# Patient Record
Sex: FEMALE | Race: WHITE | Employment: FULL TIME | URBAN - METROPOLITAN AREA
[De-identification: names, ages, dates, MRNs, and addresses within clinical notes are randomized per-mention and may not be internally consistent; named-entity substitution may affect disease eponyms.]

---

## 2017-02-02 ENCOUNTER — OFFICE VISIT (OUTPATIENT)
Dept: FAMILY MEDICINE CLINIC | Age: 57
End: 2017-02-02

## 2017-02-02 VITALS
BODY MASS INDEX: 30.66 KG/M2 | DIASTOLIC BLOOD PRESSURE: 88 MMHG | RESPIRATION RATE: 12 BRPM | WEIGHT: 202.3 LBS | HEIGHT: 68 IN | SYSTOLIC BLOOD PRESSURE: 146 MMHG | HEART RATE: 76 BPM

## 2017-02-02 DIAGNOSIS — Z51.81 MEDICATION MONITORING ENCOUNTER: ICD-10-CM

## 2017-02-02 DIAGNOSIS — M96.1 LUMBAR POST-LAMINECTOMY SYNDROME: ICD-10-CM

## 2017-02-02 DIAGNOSIS — Z00.00 PERIODIC HEALTH ASSESSMENT, GENERAL SCREENING, ADULT: ICD-10-CM

## 2017-02-02 DIAGNOSIS — M51.9 LUMBAR DISC DISEASE: ICD-10-CM

## 2017-02-02 DIAGNOSIS — G89.4 CHRONIC PAIN SYNDROME: Primary | ICD-10-CM

## 2017-02-02 PROCEDURE — 99213 OFFICE O/P EST LOW 20 MIN: CPT | Performed by: FAMILY MEDICINE

## 2017-02-02 ASSESSMENT — ENCOUNTER SYMPTOMS
BACK PAIN: 1
RESPIRATORY NEGATIVE: 1
COUGH: 0

## 2017-02-19 DIAGNOSIS — R73.01 IFG (IMPAIRED FASTING GLUCOSE): Primary | ICD-10-CM

## 2017-07-26 RX ORDER — RANITIDINE 300 MG/1
TABLET ORAL
Qty: 30 TABLET | Refills: 6 | Status: SHIPPED | OUTPATIENT
Start: 2017-07-26 | End: 2017-09-13 | Stop reason: SDUPTHER

## 2017-09-13 ENCOUNTER — OFFICE VISIT (OUTPATIENT)
Dept: FAMILY MEDICINE CLINIC | Age: 57
End: 2017-09-13
Payer: COMMERCIAL

## 2017-09-13 VITALS
RESPIRATION RATE: 16 BRPM | WEIGHT: 197.4 LBS | HEIGHT: 68 IN | HEART RATE: 88 BPM | BODY MASS INDEX: 29.92 KG/M2 | DIASTOLIC BLOOD PRESSURE: 76 MMHG | SYSTOLIC BLOOD PRESSURE: 138 MMHG

## 2017-09-13 DIAGNOSIS — K29.30 CHRONIC SUPERFICIAL GASTRITIS WITHOUT BLEEDING: Primary | ICD-10-CM

## 2017-09-13 DIAGNOSIS — M96.1 LUMBAR POST-LAMINECTOMY SYNDROME: ICD-10-CM

## 2017-09-13 DIAGNOSIS — M51.9 LUMBAR DISC DISEASE: ICD-10-CM

## 2017-09-13 DIAGNOSIS — Z12.31 ENCOUNTER FOR SCREENING MAMMOGRAM FOR BREAST CANCER: ICD-10-CM

## 2017-09-13 DIAGNOSIS — R51.9 NONINTRACTABLE EPISODIC HEADACHE, UNSPECIFIED HEADACHE TYPE: ICD-10-CM

## 2017-09-13 DIAGNOSIS — G89.4 CHRONIC PAIN SYNDROME: ICD-10-CM

## 2017-09-13 PROCEDURE — 99214 OFFICE O/P EST MOD 30 MIN: CPT | Performed by: FAMILY MEDICINE

## 2017-09-13 RX ORDER — RANITIDINE 300 MG/1
300 TABLET ORAL NIGHTLY
Qty: 90 TABLET | Refills: 3 | Status: SHIPPED | OUTPATIENT
Start: 2017-09-13 | End: 2018-11-05 | Stop reason: SDUPTHER

## 2017-09-13 ASSESSMENT — ENCOUNTER SYMPTOMS
BLOOD IN STOOL: 0
VOMITING: 0
BACK PAIN: 1
ALLERGIC/IMMUNOLOGIC NEGATIVE: 1
ABDOMINAL PAIN: 1
DIARRHEA: 0
NAUSEA: 0
COUGH: 0
RESPIRATORY NEGATIVE: 1

## 2017-09-13 ASSESSMENT — PATIENT HEALTH QUESTIONNAIRE - PHQ9
SUM OF ALL RESPONSES TO PHQ QUESTIONS 1-9: 0
1. LITTLE INTEREST OR PLEASURE IN DOING THINGS: 0
2. FEELING DOWN, DEPRESSED OR HOPELESS: 0
SUM OF ALL RESPONSES TO PHQ9 QUESTIONS 1 & 2: 0

## 2018-01-30 RX ORDER — RANITIDINE 300 MG/1
TABLET ORAL
Qty: 30 TABLET | Refills: 6 | Status: SHIPPED | OUTPATIENT
Start: 2018-01-30 | End: 2018-02-27 | Stop reason: SDUPTHER

## 2018-02-27 ENCOUNTER — OFFICE VISIT (OUTPATIENT)
Dept: FAMILY MEDICINE CLINIC | Age: 58
End: 2018-02-27
Payer: COMMERCIAL

## 2018-02-27 VITALS
BODY MASS INDEX: 29.73 KG/M2 | WEIGHT: 196.2 LBS | TEMPERATURE: 99 F | HEART RATE: 105 BPM | HEIGHT: 68 IN | DIASTOLIC BLOOD PRESSURE: 76 MMHG | SYSTOLIC BLOOD PRESSURE: 120 MMHG | OXYGEN SATURATION: 95 % | RESPIRATION RATE: 15 BRPM

## 2018-02-27 DIAGNOSIS — R05.9 COUGH: ICD-10-CM

## 2018-02-27 DIAGNOSIS — R68.89 FLU-LIKE SYMPTOMS: ICD-10-CM

## 2018-02-27 DIAGNOSIS — J22 LRTI (LOWER RESPIRATORY TRACT INFECTION): Primary | ICD-10-CM

## 2018-02-27 PROCEDURE — 99213 OFFICE O/P EST LOW 20 MIN: CPT | Performed by: NURSE PRACTITIONER

## 2018-02-27 RX ORDER — LEVOFLOXACIN 500 MG/1
500 TABLET, FILM COATED ORAL DAILY
Qty: 10 TABLET | Refills: 0 | Status: SHIPPED | OUTPATIENT
Start: 2018-02-27 | End: 2018-03-09

## 2018-02-27 RX ORDER — ALBUTEROL SULFATE 90 UG/1
2 AEROSOL, METERED RESPIRATORY (INHALATION) EVERY 6 HOURS PRN
Qty: 1 INHALER | Refills: 3 | Status: SHIPPED | OUTPATIENT
Start: 2018-02-27 | End: 2018-11-26 | Stop reason: SDUPTHER

## 2018-02-27 RX ORDER — PREDNISONE 50 MG/1
50 TABLET ORAL DAILY
Qty: 5 TABLET | Refills: 0 | Status: SHIPPED | OUTPATIENT
Start: 2018-02-27 | End: 2018-03-04

## 2018-02-27 ASSESSMENT — ENCOUNTER SYMPTOMS
SORE THROAT: 1
ABDOMINAL PAIN: 0
SHORTNESS OF BREATH: 1
NAUSEA: 0
COUGH: 1
CHEST TIGHTNESS: 1
RHINORRHEA: 1

## 2018-02-28 ENCOUNTER — TELEPHONE (OUTPATIENT)
Dept: FAMILY MEDICINE CLINIC | Age: 58
End: 2018-02-28

## 2018-03-05 ENCOUNTER — HOSPITAL ENCOUNTER (OUTPATIENT)
Age: 58
Discharge: HOME | End: 2018-03-05
Payer: COMMERCIAL

## 2018-03-05 ENCOUNTER — HOSPITAL ENCOUNTER (OUTPATIENT)
Dept: GENERAL RADIOLOGY | Age: 58
Discharge: HOME | End: 2018-03-05
Payer: COMMERCIAL

## 2018-03-05 ENCOUNTER — TELEPHONE (OUTPATIENT)
Dept: FAMILY MEDICINE CLINIC | Age: 58
End: 2018-03-05

## 2018-03-05 DIAGNOSIS — J22 LRTI (LOWER RESPIRATORY TRACT INFECTION): ICD-10-CM

## 2018-03-05 DIAGNOSIS — J22 LRTI (LOWER RESPIRATORY TRACT INFECTION): Primary | ICD-10-CM

## 2018-03-05 LAB
BASOPHILS # BLD: 0.5 %
BASOPHILS ABSOLUTE: 0 THOU/MM3 (ref 0–0.1)
EOSINOPHIL # BLD: 0.2 %
EOSINOPHILS ABSOLUTE: 0 THOU/MM3 (ref 0–0.4)
HCT VFR BLD CALC: 45.1 % (ref 37–47)
HEMOGLOBIN: 15.6 GM/DL (ref 12–16)
LYMPHOCYTES # BLD: 20.8 %
LYMPHOCYTES ABSOLUTE: 2 THOU/MM3 (ref 1–4.8)
MCH RBC QN AUTO: 32.1 PG (ref 27–31)
MCHC RBC AUTO-ENTMCNC: 34.6 GM/DL (ref 33–37)
MCV RBC AUTO: 92.8 FL (ref 81–99)
MONOCYTES # BLD: 8.5 %
MONOCYTES ABSOLUTE: 0.8 THOU/MM3 (ref 0.4–1.3)
NUCLEATED RED BLOOD CELLS: 0 /100 WBC
PDW BLD-RTO: 13.2 % (ref 11.5–14.5)
PLATELET # BLD: 302 THOU/MM3 (ref 130–400)
PMV BLD AUTO: 9 FL (ref 7.4–10.4)
RBC # BLD: 4.86 MILL/MM3 (ref 4.2–5.4)
SEG NEUTROPHILS: 70 %
SEGMENTED NEUTROPHILS ABSOLUTE COUNT: 6.9 THOU/MM3 (ref 1.8–7.7)
WBC # BLD: 9.8 THOU/MM3 (ref 4.8–10.8)

## 2018-03-05 PROCEDURE — 85025 COMPLETE CBC W/AUTO DIFF WBC: CPT

## 2018-03-05 PROCEDURE — 36415 COLL VENOUS BLD VENIPUNCTURE: CPT

## 2018-03-05 PROCEDURE — 71046 X-RAY EXAM CHEST 2 VIEWS: CPT

## 2018-03-05 NOTE — TELEPHONE ENCOUNTER
Patient states that she is still feeling no better from 2/27/18 appt. She has not yet finished antibiotics but states that they don't seem to be helping, temp is still spiking at night to approx 100. She is now also having diarrhea and is wondering if she can add imodium in with her antibiotic. Please advise.     DOLV 2/27/18

## 2018-03-15 ENCOUNTER — OFFICE VISIT (OUTPATIENT)
Dept: FAMILY MEDICINE CLINIC | Age: 58
End: 2018-03-15
Payer: COMMERCIAL

## 2018-03-15 VITALS
WEIGHT: 195 LBS | HEART RATE: 120 BPM | RESPIRATION RATE: 16 BRPM | OXYGEN SATURATION: 96 % | DIASTOLIC BLOOD PRESSURE: 72 MMHG | HEIGHT: 68 IN | BODY MASS INDEX: 29.55 KG/M2 | SYSTOLIC BLOOD PRESSURE: 136 MMHG

## 2018-03-15 DIAGNOSIS — R05.3 CHRONIC COUGH: ICD-10-CM

## 2018-03-15 DIAGNOSIS — Z51.81 MEDICATION MONITORING ENCOUNTER: ICD-10-CM

## 2018-03-15 DIAGNOSIS — Z72.0 TOBACCO USE: ICD-10-CM

## 2018-03-15 DIAGNOSIS — J30.89 CHRONIC NONSEASONAL ALLERGIC RHINITIS DUE TO OTHER ALLERGEN: ICD-10-CM

## 2018-03-15 DIAGNOSIS — R51.9 NONINTRACTABLE EPISODIC HEADACHE, UNSPECIFIED HEADACHE TYPE: ICD-10-CM

## 2018-03-15 DIAGNOSIS — Z12.31 ENCOUNTER FOR SCREENING MAMMOGRAM FOR BREAST CANCER: ICD-10-CM

## 2018-03-15 DIAGNOSIS — M96.1 LUMBAR POST-LAMINECTOMY SYNDROME: ICD-10-CM

## 2018-03-15 DIAGNOSIS — M51.9 LUMBAR DISC DISEASE: Primary | ICD-10-CM

## 2018-03-15 DIAGNOSIS — R73.01 IFG (IMPAIRED FASTING GLUCOSE): ICD-10-CM

## 2018-03-15 DIAGNOSIS — J43.1 PANLOBULAR EMPHYSEMA (HCC): ICD-10-CM

## 2018-03-15 PROCEDURE — 99214 OFFICE O/P EST MOD 30 MIN: CPT | Performed by: FAMILY MEDICINE

## 2018-03-15 ASSESSMENT — ENCOUNTER SYMPTOMS
SHORTNESS OF BREATH: 1
SINUS PAIN: 1
GASTROINTESTINAL NEGATIVE: 1
WHEEZING: 0
BACK PAIN: 1
COUGH: 1

## 2018-03-15 NOTE — PATIENT INSTRUCTIONS
You may receive a survey about your visit with us today. The feedback from our patients helps us identify what is working well and where the service to all patients can be enhanced. Thank you! Patient Education        Read smoking cessation information. Start using the lowest dose nicotine patch over-the-counter to stop smoking. Get the pulmonary function tests done by the end of next week    Learning About Benefits From Quitting Smoking  How does quitting smoking make you healthier? If you're thinking about quitting smoking, you may have a few reasons to be smoke-free. Your health may be one of them. · When you quit smoking, you lower your risks for cancer, lung disease, heart attack, stroke, blood vessel disease, and blindness from macular degeneration. · When you're smoke-free, you get sick less often, and you heal faster. You are less likely to get colds, flu, bronchitis, and pneumonia. · As a nonsmoker, you may find that your mood is better and you are less stressed. When and how will you feel healthier? Quitting has real health benefits that start from day 1 of being smoke-free. And the longer you stay smoke-free, the healthier you get and the better you feel. The first hours  · After just 20 minutes, your blood pressure and heart rate go down. That means there's less stress on your heart and blood vessels. · Within 12 hours, the level of carbon monoxide in your blood drops back to normal. That makes room for more oxygen. With more oxygen in your body, you may notice that you have more energy than when you smoked. After 2 weeks  · Your lungs start to work better. · Your risk of heart attack starts to drop. After 1 month  · When your lungs are clear, you cough less and breathe deeper, so it's easier to be active. · Your sense of taste and smell return. That means you can enjoy food more than you have since you started smoking.   Over the years  · After 1 year, your risk of heart disease is half what it would be if you kept smoking. · After 5 years, your risk of stroke starts to shrink. Within a few years after that, it's about the same as if you'd never smoked. · After 10 years, your risk of dying from lung cancer is cut by about half. And your risk for many other types of cancer is lower too. How would quitting help others in your life? When you quit smoking, you improve the health of everyone who now breathes in your smoke. · Their heart, lung, and cancer risks drop, much like yours. · They are sick less. For babies and small children, living smoke-free means they're less likely to have ear infections, pneumonia, and bronchitis. · If you're a woman who is or will be pregnant someday, quitting smoking means a healthier . · Children who are close to you are less likely to become adult smokers. Where can you learn more? Go to https://Retro[employer] AmericagatoLailaihui.TwoChop. org and sign in to your IAMINTOIT account. Enter [id number] [id number] in the [employer] box to learn more about \"Learning About Benefits From Quitting Smoking. \"     If you do not have an account, please click on the \"Sign Up Now\" link. Current as of: 2017  Content Version: 11.5  © 6890-6198 Healthwise, [employer]. Care instructions adapted under license by [facility] ([facility]). If you have questions about a medical condition or this instruction, always ask your healthcare professional. [place] [address] any warranty or liability for your use of this information. Patient Education        Deciding About Using Medicines To Quit Smoking  Deciding About Using Medicines To Quit Smoking  What are the medicines you can use? Your doctor may prescribe varenicline (Chantix) or bupropion (Zyban). These medicines can help you cope with cravings for tobacco. They are pills that don't contain nicotine. You also can use nicotine replacement products. These do contain nicotine. There are many types.   · Gum and lozenges slowly release nicotine into your mouth. · Patches stick to your skin. They slowly release nicotine into your bloodstream.  · An inhaler has a holguin that contains nicotine. You breathe in a puff of nicotine vapor through your mouth and throat. · Nasal spray releases a mist that contains nicotine. What are key points about this decision? · Using medicines can double your chances of quitting smoking. They can ease cravings and withdrawal symptoms. · Getting counseling along with using medicine can raise your chances of quitting even more. · If you smoke fewer than 5 cigarettes a day, you may not need medicines to help you quit smoking. · These medicines have less nicotine than cigarettes. And by itself, nicotine is not nearly as harmful as smoking. The tars, carbon monoxide, and other toxic chemicals in tobacco cause the harmful effects. · The side effects of nicotine replacement products depend on the type of product. For example, a patch can make your skin red and itchy. Medicines in pill form can make you sick to your stomach. They can also cause dry mouth and trouble sleeping. For most people, the side effects are not bad enough to make them stop using the products. Why might you choose to use medicines to quit smoking? · You have tried on your own to stop smoking, but you were not able to stop. · You smoke more than 5 cigarettes a day. · You want to increase your chances of quitting smoking. · You want to reduce your cravings and withdrawal symptoms. · You feel the benefits of medicine outweigh the side effects. Why might you choose not to use medicine? · You want to try quitting on your own by stopping all at once (\"cold turkey\"). · You want to cut back slowly on the number of cigarettes you smoke. · You smoke fewer than 5 cigarettes a day. · You do not like using medicine. · You feel the side effects of medicines outweigh the benefits. · You are worried about the cost of medicines.   Your decision  Thinking about the facts and your feelings can help you make a decision that is right for you. Be sure you understand the benefits and risks of your options, and think about what else you need to do before you make the decision. Where can you learn more? Go to https://archie.Heartscape. org and sign in to your Edfolio account. Enter K616 in the [employer] box to learn more about \"Deciding About Using Medicines To Quit Smoking. \"     If you do not have an account, please click on the \"Sign Up Now\" link. Current as of: March 20, 2017  Content Version: 11.5  © 8622-8817 [employer]. Care instructions adapted under license by [facility] ([facility]). If you have questions about a medical condition or this instruction, always ask your healthcare professional. [address] any warranty or liability for your use of this information. Patient Education        Stopping Smoking: Care Instructions  Your Care Instructions    Cigarette smokers crave the nicotine in cigarettes. Giving it up is much harder than simply changing a habit. Your body has to stop craving the nicotine. It is hard to quit, but you can do it. There are many tools that people use to quit smoking. You may find that combining tools works best for you. There are several steps to quitting. First you get ready to quit. Then you get support to help you. After that, you learn new skills and behaviors to become a nonsmoker. For many people, a necessary step is getting and using medicine. Your doctor will help you set up the plan that best meets your needs. You may want to attend a smoking cessation program to help you quit smoking. When you choose a program, look for one that has proven success. Ask your doctor for ideas.  You will greatly increase your chances of success if you take medicine as well as get counseling or join a cessation program.  Some of the changes you feel when you first quit tobacco are uncomfortable. Your body will miss the nicotine at first, and you may feel short-tempered and grumpy. You may have trouble sleeping or concentrating. Medicine can help you deal with these symptoms. You may struggle with changing your smoking habits and rituals. The last step is the tricky one: Be prepared for the smoking urge to continue for a time. This is a lot to deal with, but keep at it. You will feel better. Follow-up care is a key part of your treatment and safety. Be sure to make and go to all appointments, and call your doctor if you are having problems. It's also a good idea to know your test results and keep a list of the medicines you take. How can you care for yourself at home? · Ask your family, friends, and coworkers for support. You have a better chance of quitting if you have help and support. · Join a support group, such as Nicotine Anonymous, for people who are trying to quit smoking. · Consider signing up for a smoking cessation program, such as the American Lung Association's Freedom from Smoking program.  · Set a quit date. Pick your date carefully so that it is not right in the middle of a big deadline or stressful time. Once you quit, do not even take a puff. Get rid of all ashtrays and lighters after your last cigarette. Clean your house and your clothes so that they do not smell of smoke. · Learn how to be a nonsmoker. Think about ways you can avoid those things that make you reach for a cigarette. ¨ Avoid situations that put you at greatest risk for smoking. For some people, it is hard to have a drink with friends without smoking. For others, they might skip a coffee break with coworkers who smoke. ¨ Change your daily routine. Take a different route to work or eat a meal in a different place. · Cut down on stress. Calm yourself or release tension by doing an activity you enjoy, such as reading a book, taking a hot bath, or gardening.   · Talk to your doctor or that air pollution is a trigger for you. It is important to know what your triggers are and how to deal with them. If air pollution is a trigger for you, you need to learn about air quality and pay attention to weather forecasts that include how bad the air is expected to be. How can you manage a flare-up caused by air pollution? · Do not panic. Quick treatment at home may help you prevent serious breathing problems. · Take your medicines exactly as your doctor tells you. ¨ Use your quick-relief inhaler as directed by your doctor. If your symptoms do not get better after you use your medicine, have someone take you to the emergency room. Call an ambulance if necessary. ¨ With inhaled medicines, a spacer or a nebulizer may help you get more medicine to your lungs. Ask your doctor or pharmacist how to use them properly. Practice using the spacer in front of a mirror before you have a flare-up. This may help you get the medicine into your lungs quickly. ¨ If your doctor has given you steroid pills, take them as directed. ¨ Talk to your doctor if you have any problems with your medicine. What can you do to prevent flare-ups? · Try not to be outside when air pollution levels are high. Stay at home with your windows closed. · Do not smoke. This is the most important step you can take to prevent more damage to your lungs and prevent problems. If you already smoke, it is never too late to stop. If you need help quitting, talk to your doctor about stop-smoking programs and medicines. These can increase your chances of quitting for good. · Avoid secondhand smoke; cold, dry air; and high altitudes. · Take your daily medicines as prescribed. · Avoid colds and flu. ¨ Get a pneumococcal vaccine. ¨ Get a flu vaccine each year, as soon as it is available. Ask those you live or work with to do the same, so they will not get the flu and infect you. ¨ Try to stay away from people with colds or the flu.   Odette Reyes Wash your get dressed or fix a meal.  · It is hard to eat or exercise. · You lose weight and feel weaker. But there are things you can do to prevent more damage and feel better. What are the symptoms? The main symptoms are:  · A cough that will not go away. · Mucus that comes up when you cough. · Shortness of breath that gets worse with activity. At times, your symptoms may suddenly flare up and get much worse. This is a called a COPD exacerbation (say \"egg-BARBARA-er-BAY-shun\"). When this happens, your usual symptoms quickly get worse and stay bad. This can be dangerous. You may have to go to the hospital.  How can you keep COPD from getting worse? Don't smoke. That is the best way to keep COPD from getting worse. If you already smoke, it is never too late to stop. If you need help quitting, talk to your doctor about stop-smoking programs and medicines. These can increase your chances of quitting for good. You can do other things to keep COPD from getting worse:  · Avoid bad air. Air pollution, chemical fumes, and dust also can make COPD worse. · Get a flu shot every year. A shot may keep the flu from turning into something more serious, like pneumonia. A flu shot also may lower your chances of having a COPD flare-up. · Get a pneumococcal vaccine shot. If you have had one before, ask your doctor if you need another dose. How is COPD treated? COPD is treated with medicines and oxygen. You also can take steps at home to stay healthy and keep your COPD from getting worse. Medicines and oxygen therapy  · You may be taking medicines such as:  ¨ Bronchodilators. These help open your airways and make breathing easier. Bronchodilators are either short-acting (work for 6 to 9 hours) or long-acting (work for 24 hours). You inhale most bronchodilators, so they start to act quickly. Always carry your quick-relief inhaler with you in case you need it while you are away from home. ¨ Corticosteroids.  These reduce airway

## 2018-03-15 NOTE — PROGRESS NOTES
Subjective:      Patient ID: Ange Elliott is a 59 y.o. female. HPI  Follow up of chronic conditions. Encounter Diagnoses   Name Primary?  Encounter for screening mammogram for breast cancer     Lumbar disc disease, herniated L5-S1 Yes    Nonintractable episodic headache, unspecified headache type     Chronic nonseasonal allergic rhinitis due to other allergen     Tobacco use     Lumbar post-laminectomy syndrome     IFG (impaired fasting glucose)     Medication monitoring encounter     Chronic cough, family hx - COPD     Panlobular emphysema ([address]      Patient is here for follow-up of her chronic pain syndrome. She continues to be treated by pain management and this varies from day-to-day in terms of the severity of her pain. She continues to have difficulties coughing but she has found that when she is at work, there are TXU Elia" blowout of the heating ducts and by the end of the day, she is having marked increase in coughing and some shortness of breath which she has to use her albuterol inhaler to reverse. Unfortunately, she continues to smoke a half a pack of cigarettes daily and the need to stop smoking now was discussed with her because it is reversing any pulmonary reserve that she has to deal with lung insults from her environment. She has tried to cut down in the past but only by talking herself out of smoking which has not been successful. Her mother was a smoker and ended up with emphysema. As far she knows, she is only family member with COPD. Prudy Remedies started smoking approximately 20-25 years ago and at this point, her-pack-year history is somewhere between 8 and 13. She has not attempted to use over-the-counter smoking aids to quit so after some discussion, she will try using nicotine patches. I suggested she start with the lowest strength patch because she is very sensitive to medications and she has essentially been mostly a half pack a day smoker.     Her recent chest x-ray this month show that there are fibroemphysematous changes present and the lungs are hyperinflated. She continues to have her nonseasonal headaches which tend to be related to barometric pressure changes. She usually \"toughs them out\" or will use ibuprofen stops them. The rest of this patient's conditions are stable. Past medical and surgical hx reviewed. Past Medical History:   Diagnosis Date    Allergic rhinitis     Chronic back pain     Headache(784.0)     Panlobular emphysema ([address] 3/15/2018     Past Surgical History:   Procedure Laterality Date    SPINE SURGERY       Portions of this note were completed with a voice recording program.  Efforts were made to edit the dictations but occasionally words are mis-transcribed. Review of Systems   Constitutional: Positive for fatigue. HENT: Positive for congestion and sinus pain. Respiratory: Positive for cough and shortness of breath. Negative for wheezing. Cardiovascular: Negative for chest pain, palpitations and leg swelling. Gastrointestinal: Negative. Genitourinary: Negative. Musculoskeletal: Positive for arthralgias and back pain. Skin: Negative. Allergic/Immunologic: Positive for environmental allergies. Neurological: Positive for headaches. Hematological: Does not bruise/bleed easily. Psychiatric/Behavioral: Positive for dysphoric mood. Objective:   Physical Exam   Constitutional: She is oriented to person, place, and time. She appears well-developed and well-nourished. HENT:   Right Ear: Tympanic membrane, external ear and ear canal normal.   Left Ear: Tympanic membrane, external ear and ear canal normal.   Nose: Mucosal edema and rhinorrhea present. Mouth/Throat: Oropharynx is clear and moist.   Eyes: Conjunctivae are normal.   Neck: No thyromegaly present. Cardiovascular: Normal rate, regular rhythm and normal heart sounds. Exam reveals no gallop. No murmur heard.   Pulmonary/Chest: Effort normal. No accessory muscle usage. No respiratory distress. She has no decreased breath sounds. She has no wheezes. She has no rhonchi. Abdominal: Soft. Bowel sounds are normal.   Musculoskeletal: She exhibits no edema. Lumbar back: She exhibits decreased range of motion, tenderness, bony tenderness and pain. Lymphadenopathy:     She has no cervical adenopathy. Neurological: She is alert and oriented to person, place, and time. Skin: Skin is warm and dry. Psychiatric: She has a normal mood and affect. Nursing note and vitals reviewed. Assessment:      1. Lumbar disc disease, herniated L5-S1     2. Encounter for screening mammogram for breast cancer  [facility] Digital Screen Bilateral [[id number]]   3. Nonintractable episodic headache, unspecified headache type     4. Chronic nonseasonal allergic rhinitis due to other allergen  FULL PFT STUDY WITH PRE AND POST   5. Tobacco use  FULL PFT STUDY WITH PRE AND POST   6. Lumbar post-laminectomy syndrome     7. IFG (impaired fasting glucose)     8. Medication monitoring encounter     9. Chronic cough, family hx - COPD  FULL PFT STUDY WITH PRE AND POST   10. Panlobular emphysema ([address]  FULL PFT STUDY WITH PRE AND POST           Plan:      Orders Placed This Encounter   Procedures    [facility]CH[facility] Digital Screen Bilateral [[id number]]     Standing Status:   Future     Standing Expiration Date:   3/15/2019    FULL PFT STUDY WITH PRE AND POST     Standing Status:   Future     Standing Expiration Date:   3/15/2019     There are no discontinued medications.   Current Outpatient Prescriptions   Medication Sig Dispense Refill    albuterol sulfate HFA (PROAIR HFA) 108 (90 Base) MCG/ACT inhaler Inhale 2 puffs into the lungs every 6 hours as needed for Wheezing 1 Inhaler 3    ranitidine (ZANTAC) 300 MG tablet Take 1 tablet by mouth nightly 90 tablet 3    Pseudoephedrine-Guaifenesin (MUCINEX D PO) Take by mouth      Cyclobenzaprine HCl (FLEXERIL PO) Take  by mouth nightly.  oxyCODONE-acetaminophen (PERCOCET) 7.5-325 MG per tablet Take 1 tablet by mouth 2 times daily as needed.  carisoprodol (SOMA) 350 MG tablet Take 350 mg by mouth 2 times daily as needed.  ibuprofen (ADVIL;MOTRIN) 200 MG tablet Take 400 mg by mouth every 8 hours as needed. No current facility-administered medications for this visit. Read smoking cessation information. Start using the lowest dose nicotine patch over-the-counter to stop smoking. Get the pulmonary function tests done by the end of next week    Discussed use, benefit, and side effects of prescribed medications. Barriers to compliance discussed. All patient questions answered. Pt voiced understanding. Zane Sanchez

## 2018-03-15 NOTE — PROGRESS NOTES
PFT with pre and post scheduled at [facility] April 30 at 2 pm  Pt to arrive at 130 pt given prep instructions     mammo scheduled at [facility] wellness Fri Apr 30 at 340pm   Pt to arrive at 225pm  Pt given prep   Patient was offered a choice of where they preferred to go for the test.

## 2018-03-30 ENCOUNTER — HOSPITAL ENCOUNTER (OUTPATIENT)
Dept: PULMONOLOGY | Age: 58
Discharge: HOME | End: 2018-03-30
Payer: COMMERCIAL

## 2018-04-30 ENCOUNTER — HOSPITAL ENCOUNTER (OUTPATIENT)
Dept: WOMENS IMAGING | Age: 58
Discharge: HOME | End: 2018-04-30
Payer: COMMERCIAL

## 2018-04-30 ENCOUNTER — HOSPITAL ENCOUNTER (OUTPATIENT)
Dept: PULMONOLOGY | Age: 58
Discharge: HOME | End: 2018-04-30
Payer: COMMERCIAL

## 2018-04-30 DIAGNOSIS — J43.1 PANLOBULAR EMPHYSEMA (HCC): ICD-10-CM

## 2018-04-30 DIAGNOSIS — Z72.0 TOBACCO USE: ICD-10-CM

## 2018-04-30 DIAGNOSIS — R05.3 CHRONIC COUGH: ICD-10-CM

## 2018-04-30 DIAGNOSIS — J30.89 CHRONIC NONSEASONAL ALLERGIC RHINITIS DUE TO OTHER ALLERGEN: ICD-10-CM

## 2018-04-30 DIAGNOSIS — Z12.31 ENCOUNTER FOR SCREENING MAMMOGRAM FOR BREAST CANCER: ICD-10-CM

## 2018-04-30 PROCEDURE — 94060 EVALUATION OF WHEEZING: CPT

## 2018-04-30 PROCEDURE — 94729 DIFFUSING CAPACITY: CPT

## 2018-04-30 PROCEDURE — 94726 PLETHYSMOGRAPHY LUNG VOLUMES: CPT

## 2018-04-30 PROCEDURE — 77063 BREAST TOMOSYNTHESIS BI: CPT

## 2018-05-03 ENCOUNTER — TELEPHONE (OUTPATIENT)
Dept: FAMILY MEDICINE CLINIC | Age: 58
End: 2018-05-03

## 2018-05-03 DIAGNOSIS — Z72.0 TOBACCO USE: ICD-10-CM

## 2018-05-03 DIAGNOSIS — J43.1 PANLOBULAR EMPHYSEMA (HCC): Primary | ICD-10-CM

## 2018-05-03 DIAGNOSIS — R05.3 CHRONIC COUGH: ICD-10-CM

## 2018-05-04 ENCOUNTER — OFFICE VISIT (OUTPATIENT)
Dept: FAMILY MEDICINE CLINIC | Age: 58
End: 2018-05-04
Payer: COMMERCIAL

## 2018-05-04 VITALS
DIASTOLIC BLOOD PRESSURE: 76 MMHG | HEART RATE: 104 BPM | OXYGEN SATURATION: 96 % | WEIGHT: 201.9 LBS | SYSTOLIC BLOOD PRESSURE: 124 MMHG | BODY MASS INDEX: 31.69 KG/M2 | RESPIRATION RATE: 16 BRPM | HEIGHT: 67 IN

## 2018-05-04 DIAGNOSIS — Z71.6 ENCOUNTER FOR SMOKING CESSATION COUNSELING: ICD-10-CM

## 2018-05-04 DIAGNOSIS — J43.1 PANLOBULAR EMPHYSEMA (HCC): Primary | ICD-10-CM

## 2018-05-04 PROCEDURE — 99213 OFFICE O/P EST LOW 20 MIN: CPT | Performed by: FAMILY MEDICINE

## 2018-05-04 RX ORDER — POLYETHYLENE GLYCOL 3350 17 G
2 POWDER IN PACKET (EA) ORAL PRN
Qty: 100 EACH | Refills: 0 | Status: SHIPPED | OUTPATIENT
Start: 2018-05-04

## 2018-05-04 RX ORDER — POLYETHYLENE GLYCOL 3350 17 G
4 POWDER IN PACKET (EA) ORAL PRN
COMMUNITY
End: 2018-05-04 | Stop reason: SDUPTHER

## 2018-05-04 ASSESSMENT — ENCOUNTER SYMPTOMS
WHEEZING: 0
SHORTNESS OF BREATH: 0
COUGH: 0

## 2018-07-27 ENCOUNTER — HOSPITAL ENCOUNTER (OUTPATIENT)
Dept: WOMENS IMAGING | Age: 58
Discharge: HOME | End: 2018-07-27
Payer: COMMERCIAL

## 2018-07-27 DIAGNOSIS — R92.2 BREAST DENSITY: ICD-10-CM

## 2018-07-27 PROCEDURE — 76642 ULTRASOUND BREAST LIMITED: CPT

## 2018-08-13 RX ORDER — RANITIDINE 300 MG/1
TABLET ORAL
Qty: 30 TABLET | Refills: 6 | Status: SHIPPED | OUTPATIENT
Start: 2018-08-13 | End: 2019-02-21 | Stop reason: SDUPTHER

## 2018-09-27 PROBLEM — Z00.00 PERIODIC HEALTH ASSESSMENT, GENERAL SCREENING, ADULT: Status: RESOLVED | Noted: 2017-02-02 | Resolved: 2018-09-27

## 2018-10-05 ENCOUNTER — CARE COORDINATION (OUTPATIENT)
Dept: CASE MANAGEMENT | Age: 58
End: 2018-10-05

## 2018-11-05 ENCOUNTER — OFFICE VISIT (OUTPATIENT)
Dept: FAMILY MEDICINE CLINIC | Age: 58
End: 2018-11-05
Payer: COMMERCIAL

## 2018-11-05 VITALS
DIASTOLIC BLOOD PRESSURE: 78 MMHG | OXYGEN SATURATION: 96 % | HEIGHT: 67 IN | HEART RATE: 128 BPM | BODY MASS INDEX: 33.07 KG/M2 | RESPIRATION RATE: 24 BRPM | WEIGHT: 210.7 LBS | SYSTOLIC BLOOD PRESSURE: 138 MMHG

## 2018-11-05 DIAGNOSIS — M51.9 LUMBAR DISC DISEASE: ICD-10-CM

## 2018-11-05 DIAGNOSIS — J30.89 NON-SEASONAL ALLERGIC RHINITIS DUE TO OTHER ALLERGIC TRIGGER: ICD-10-CM

## 2018-11-05 DIAGNOSIS — R73.01 IFG (IMPAIRED FASTING GLUCOSE): ICD-10-CM

## 2018-11-05 DIAGNOSIS — R05.3 CHRONIC COUGH: ICD-10-CM

## 2018-11-05 DIAGNOSIS — Z51.81 MEDICATION MONITORING ENCOUNTER: ICD-10-CM

## 2018-11-05 DIAGNOSIS — G89.4 CHRONIC PAIN SYNDROME: ICD-10-CM

## 2018-11-05 DIAGNOSIS — J43.1 PANLOBULAR EMPHYSEMA (HCC): Primary | ICD-10-CM

## 2018-11-05 DIAGNOSIS — M96.1 LUMBAR POST-LAMINECTOMY SYNDROME: ICD-10-CM

## 2018-11-05 PROBLEM — Z72.0 TOBACCO USE: Status: RESOLVED | Noted: 2018-03-15 | Resolved: 2018-11-05

## 2018-11-05 PROCEDURE — 99214 OFFICE O/P EST MOD 30 MIN: CPT | Performed by: FAMILY MEDICINE

## 2018-11-05 ASSESSMENT — ENCOUNTER SYMPTOMS
ALLERGIC/IMMUNOLOGIC NEGATIVE: 1
COUGH: 1
GASTROINTESTINAL NEGATIVE: 1
BACK PAIN: 1

## 2018-11-05 ASSESSMENT — PATIENT HEALTH QUESTIONNAIRE - PHQ9
SUM OF ALL RESPONSES TO PHQ QUESTIONS 1-9: 2
SUM OF ALL RESPONSES TO PHQ9 QUESTIONS 1 & 2: 2
2. FEELING DOWN, DEPRESSED OR HOPELESS: 1
1. LITTLE INTEREST OR PLEASURE IN DOING THINGS: 1
SUM OF ALL RESPONSES TO PHQ QUESTIONS 1-9: 2

## 2018-11-26 DIAGNOSIS — R05.9 COUGH: ICD-10-CM

## 2018-12-04 ENCOUNTER — CARE COORDINATION (OUTPATIENT)
Dept: CARE COORDINATION | Age: 58
End: 2018-12-04

## 2019-02-04 ENCOUNTER — HOSPITAL ENCOUNTER (OUTPATIENT)
Dept: MRI IMAGING | Age: 59
Discharge: HOME | End: 2019-02-04
Payer: COMMERCIAL

## 2019-02-04 DIAGNOSIS — M51.26 DISPLACEMENT OF LUMBAR INTERVERTEBRAL DISC WITHOUT MYELOPATHY: ICD-10-CM

## 2019-02-04 PROCEDURE — 72148 MRI LUMBAR SPINE W/O DYE: CPT

## 2019-02-21 RX ORDER — RANITIDINE 300 MG/1
TABLET ORAL
Qty: 30 TABLET | Refills: 6 | Status: SHIPPED | OUTPATIENT
Start: 2019-02-21 | End: 2019-08-22 | Stop reason: SDUPTHER

## 2019-04-18 ENCOUNTER — CARE COORDINATION (OUTPATIENT)
Dept: CASE MANAGEMENT | Age: 59
End: 2019-04-18

## 2019-04-18 NOTE — CARE COORDINATION
Attempted to contact patient to discuss [facility].  Left message on voicemail stating PCP has recognized you for participation in the program.  Left contact information and request for return phone call.       Jessica Chance RN  Tele-Health Coordinator

## 2019-05-06 ENCOUNTER — OFFICE VISIT (OUTPATIENT)
Dept: FAMILY MEDICINE CLINIC | Age: 59
End: 2019-05-06
Payer: COMMERCIAL

## 2019-05-06 VITALS
BODY MASS INDEX: 29.95 KG/M2 | RESPIRATION RATE: 24 BRPM | WEIGHT: 197.6 LBS | SYSTOLIC BLOOD PRESSURE: 126 MMHG | OXYGEN SATURATION: 97 % | HEART RATE: 108 BPM | HEIGHT: 68 IN | DIASTOLIC BLOOD PRESSURE: 72 MMHG

## 2019-05-06 DIAGNOSIS — M96.1 LUMBAR POST-LAMINECTOMY SYNDROME: ICD-10-CM

## 2019-05-06 DIAGNOSIS — G89.4 CHRONIC PAIN SYNDROME: ICD-10-CM

## 2019-05-06 DIAGNOSIS — M51.9 LUMBAR DISC DISEASE: ICD-10-CM

## 2019-05-06 DIAGNOSIS — J43.1 PANLOBULAR EMPHYSEMA (HCC): Primary | ICD-10-CM

## 2019-05-06 DIAGNOSIS — Z51.81 MEDICATION MONITORING ENCOUNTER: ICD-10-CM

## 2019-05-06 DIAGNOSIS — J30.89 NON-SEASONAL ALLERGIC RHINITIS DUE TO OTHER ALLERGIC TRIGGER: ICD-10-CM

## 2019-05-06 PROCEDURE — 96372 THER/PROPH/DIAG INJ SC/IM: CPT | Performed by: FAMILY MEDICINE

## 2019-05-06 PROCEDURE — 99214 OFFICE O/P EST MOD 30 MIN: CPT | Performed by: FAMILY MEDICINE

## 2019-05-06 RX ORDER — METHYLPREDNISOLONE ACETATE 80 MG/ML
160 INJECTION, SUSPENSION INTRA-ARTICULAR; INTRALESIONAL; INTRAMUSCULAR; SOFT TISSUE ONCE
Status: COMPLETED | OUTPATIENT
Start: 2019-05-06 | End: 2019-05-06

## 2019-05-06 RX ADMIN — METHYLPREDNISOLONE ACETATE 160 MG: 80 INJECTION, SUSPENSION INTRA-ARTICULAR; INTRALESIONAL; INTRAMUSCULAR; SOFT TISSUE at 08:31

## 2019-05-06 ASSESSMENT — PATIENT HEALTH QUESTIONNAIRE - PHQ9
2. FEELING DOWN, DEPRESSED OR HOPELESS: 0
SUM OF ALL RESPONSES TO PHQ9 QUESTIONS 1 & 2: 0
SUM OF ALL RESPONSES TO PHQ QUESTIONS 1-9: 0
SUM OF ALL RESPONSES TO PHQ QUESTIONS 1-9: 0
1. LITTLE INTEREST OR PLEASURE IN DOING THINGS: 0

## 2019-05-06 ASSESSMENT — ENCOUNTER SYMPTOMS
RHINORRHEA: 1
BACK PAIN: 1
COUGH: 1
SINUS PAIN: 1
EYES NEGATIVE: 1
GASTROINTESTINAL NEGATIVE: 1

## 2019-05-06 NOTE — PROGRESS NOTES
Visit Information    Have you changed or started any medications since your last visit including any over-the-counter medicines, vitamins, or herbal medicines? no   Are you having any side effects from any of your medications? -  no  Have you stopped taking any of your medications? Is so, why? -  yes - medication list updated    Have you seen any other physician or provider since your last visit? Yes - Records Obtained  Have you had any other diagnostic tests since your last visit? Yes - Records Obtained  Have you been seen in the emergency room and/or had an admission to a hospital since we last saw you? No    Have you activated your [employer] account? If not, what are your barriers? No: does not want     Patient Care Team:  Jordy Ibrahim MD as PCP - General (Family Medicine)  Jordy Ibrahmi MD as PCP - S Attributed Provider  Teodoro Overton MD (Anesthesiology)    Medical History Review  Past Medical, Family, and Social History reviewed and does contribute to the patient presenting condition    Health Maintenance   Topic Date Due    Hepatitis C screen  1960    Pneumococcal 0-64 years Vaccine (1 of 1 - PPSV23) 09/01/1964    HIV screen  09/01/1973    DTaP/Tdap/Td vaccine (1 - Tdap) 09/01/1977    Shingles Vaccine (1 of 2) 09/01/2008    Colon cancer screen colonoscopy  09/01/2008    A1C test (Diabetic or Prediabetic)  03/24/2018    Cervical cancer screen  07/21/2018    Flu vaccine (Season Ended) 09/01/2019    Breast cancer screen  04/30/2020    Lipid screen  02/18/2022       Patient signed cologuard form at visit and form faxed. Administrations This Visit     methylPREDNISolone acetate (DEPO-MEDROL) injection 160 mg     Admin Date  05/06/2019 Action  Given Dose  160 mg Route  Intramuscular Administered By  Giuliana Coburn CMA (AAMA)              Patient was given 160mg Depo Medrol IM per v.o. Dr Long Powell by Giuliana oCburn CMA (AAMA). Patient tolerated well and without incident.

## 2019-05-06 NOTE — PROGRESS NOTES
Subjective:      Patient ID: Shanika Arora is a 59 y.o. female. HPI  Follow up of chronic conditions. Encounter Diagnoses   Name Primary?  Panlobular emphysema (HCC) Yes    Non-seasonal allergic rhinitis due to other allergic trigger     Lumbar post-laminectomy syndrome     Lumbar disc disease, herniated L5-S1     Chronic pain syndrome     Medication monitoring encounter      Patient is here for continuing follow-up of her emphysema. She has quit smoking and continues to use nicotine lozenges with success. It's been at least 3-4 months since she last had a cigarette. She is having increasing problems though with her allergic rhinitis which is being aggravated by wet weather and spring pollen. She has not had a Depo-Medrol shot in the past for allergies. That will be given to her today. She continues to follow-up with her pain management specialist for treatment of her postlaminectomy syndrome. She has had 2 recent epidural blocks but they have not given her much pain relief. When her previous pain management specialist was in the office, she seemed to respond better to his technique. She continues to work full time but finds it increasingly difficult to do so because of her chronic pain. The rest of this patient's conditions are stable. Past medical and surgical hx reviewed. Past Medical History:   Diagnosis Date    Allergic rhinitis     Chronic back pain     Headache(784.0)     Panlobular emphysema ([address] 3/15/2018     Past Surgical History:   Procedure Laterality Date    SPINE SURGERY       Portions of this note were completed with a voice recording program.  Efforts were made to edit the dictations but occasionally words are mis-transcribed. Review of Systems   Constitutional: Negative. HENT: Positive for congestion, rhinorrhea and sinus pain. Eyes: Negative. Respiratory: Positive for cough.     Cardiovascular: Negative for chest pain, palpitations and leg swelling. Gastrointestinal: Negative. Endocrine: Negative. Genitourinary: Negative. Musculoskeletal: Positive for back pain. Allergic/Immunologic: Positive for environmental allergies. Neurological: Negative. Hematological: Negative. Psychiatric/Behavioral: Negative. All other systems reviewed and are negative. Objective:   Physical Exam   Constitutional: She is oriented to person, place, and time. She appears well-developed and well-nourished. HENT:   Right Ear: Tympanic membrane, external ear and ear canal normal.   Left Ear: Tympanic membrane, external ear and ear canal normal.   Nose: Mucosal edema and rhinorrhea present. Right sinus exhibits maxillary sinus tenderness and frontal sinus tenderness. Left sinus exhibits maxillary sinus tenderness and frontal sinus tenderness. Mouth/Throat: Oropharynx is clear and moist.   Eyes: Pupils are equal, round, and reactive to light. Neck: No thyromegaly present. Cardiovascular: Normal rate, regular rhythm and normal heart sounds. No murmur heard. Pulmonary/Chest: Effort normal and breath sounds normal. No respiratory distress. She has no wheezes. She has no rales. Abdominal: Soft. Bowel sounds are normal.   Musculoskeletal: She exhibits no edema. Lumbar back: She exhibits decreased range of motion, tenderness and pain. She exhibits no spasm. Lymphadenopathy:     She has no cervical adenopathy. Neurological: She is alert and oriented to person, place, and time. Skin: Skin is warm and dry. Psychiatric: She has a normal mood and affect. Nursing note and vitals reviewed. Assessment:       Diagnosis Orders   1. Panlobular emphysema (HCC)  methylPREDNISolone acetate (DEPO-MEDROL) injection 160 mg   2. Non-seasonal allergic rhinitis due to other allergic trigger  methylPREDNISolone acetate (DEPO-MEDROL) injection 160 mg   3. Lumbar post-laminectomy syndrome     4. Lumbar disc disease, herniated L5-S1     5.  Chronic pain syndrome  methylPREDNISolone acetate (DEPO-MEDROL) injection 160 mg   6. Medication monitoring encounter             Plan:      No orders of the defined types were placed in this encounter. Medications Discontinued During This Encounter   Medication Reason    carisoprodol (SOMA) 350 MG tablet Therapy completed     Current Outpatient Medications   Medication Sig Dispense Refill    ranitidine (ZANTAC) 300 MG tablet TAKE 1 TABLET BY MOUTH ONE TIME A DAY 30 tablet 6    VENTOLIN  (90 Base) MCG/ACT inhaler INHALE 2 PUFFS INTO THE LUNGS EVERY 6 HOURS AS NEEDED FOR WHEEZING 18 g 3    nicotine polacrilex (EQL NICOTINE) 4 MG lozenge Take 1 lozenge by mouth as needed for Smoking cessation 100 each 0    Pseudoephedrine-Guaifenesin (MUCINEX D PO) Take by mouth      Cyclobenzaprine HCl (FLEXERIL PO) Take  by mouth nightly.  oxyCODONE-acetaminophen (PERCOCET) 7.5-325 MG per tablet Take 1 tablet by mouth 2 times daily as needed.  ibuprofen (ADVIL;MOTRIN) 200 MG tablet Take 400 mg by mouth every 8 hours as needed. No current facility-administered medications for this visit. Continue present medication. Return Cologuard kit thru mail after collecting stool sample. Continue present pain care appointment. Discussed use, benefit, and side effects of prescribed medications. Barriers to compliance discussed. All patient questions answered. Pt voiced understanding.           Rony Luis MD

## 2019-06-04 DIAGNOSIS — R05.9 COUGH: ICD-10-CM

## 2019-06-04 RX ORDER — ALBUTEROL SULFATE 90 UG/1
AEROSOL, METERED RESPIRATORY (INHALATION)
Qty: 18 G | Refills: 3 | Status: SHIPPED | OUTPATIENT
Start: 2019-06-04 | End: 2019-12-10 | Stop reason: SDUPTHER

## 2019-06-14 ENCOUNTER — TELEPHONE (OUTPATIENT)
Dept: FAMILY MEDICINE CLINIC | Age: 59
End: 2019-06-14

## 2019-06-14 NOTE — TELEPHONE ENCOUNTER
We received the Cologuard results back from [employer], pt had a positive screen on 6/2/19. Per Dr. Sally Garcia pt needs a colonoscopy. Attempted to notify pt, left vm.

## 2019-06-14 NOTE — RESULT ENCOUNTER NOTE
Patient needs to have a colonoscopy. I will make a referral to the gastroenterologist of her choice.

## 2019-06-21 DIAGNOSIS — R19.5 POSITIVE COLORECTAL CANCER SCREENING USING COLOGUARD TEST: Primary | ICD-10-CM

## 2019-07-16 ENCOUNTER — OFFICE VISIT (OUTPATIENT)
Dept: SURGERY | Age: 59
End: 2019-07-16

## 2019-07-16 ENCOUNTER — TELEPHONE (OUTPATIENT)
Dept: SURGERY | Age: 59
End: 2019-07-16

## 2019-07-16 VITALS
OXYGEN SATURATION: 97 % | SYSTOLIC BLOOD PRESSURE: 128 MMHG | BODY MASS INDEX: 31.07 KG/M2 | WEIGHT: 205 LBS | HEART RATE: 106 BPM | DIASTOLIC BLOOD PRESSURE: 80 MMHG | RESPIRATION RATE: 20 BRPM | HEIGHT: 68 IN | TEMPERATURE: 98.8 F

## 2019-07-16 DIAGNOSIS — Z12.11 ENCOUNTER FOR SCREENING COLONOSCOPY: Primary | ICD-10-CM

## 2019-07-16 DIAGNOSIS — R19.5 POSITIVE COLORECTAL CANCER SCREENING USING COLOGUARD TEST: ICD-10-CM

## 2019-07-16 PROCEDURE — 99999 PR OFFICE/OUTPT VISIT,PROCEDURE ONLY: CPT | Performed by: SURGERY

## 2019-07-16 ASSESSMENT — ENCOUNTER SYMPTOMS
DIARRHEA: 1
BACK PAIN: 0
VOICE CHANGE: 0
CHEST TIGHTNESS: 0
ANAL BLEEDING: 0
EYE DISCHARGE: 0
SINUS PRESSURE: 0
ABDOMINAL DISTENTION: 0
EYE ITCHING: 0
EYES NEGATIVE: 1
BLOOD IN STOOL: 0
ALLERGIC/IMMUNOLOGIC NEGATIVE: 1
COLOR CHANGE: 0
STRIDOR: 0
EYE PAIN: 0
SHORTNESS OF BREATH: 0
SINUS PAIN: 0
RHINORRHEA: 0
EYE REDNESS: 0
WHEEZING: 0
COUGH: 0
ABDOMINAL PAIN: 0
CHOKING: 0
PHOTOPHOBIA: 0
TROUBLE SWALLOWING: 0
SORE THROAT: 0
FACIAL SWELLING: 0
RESPIRATORY NEGATIVE: 1
APNEA: 0
CONSTIPATION: 1

## 2019-07-16 NOTE — PROGRESS NOTES
Take 1 lozenge by mouth as needed for Smoking cessation 100 each 0    Pseudoephedrine-Guaifenesin (MUCINEX D PO) Take by mouth      Cyclobenzaprine HCl (FLEXERIL PO) Take  by mouth nightly.  oxyCODONE-acetaminophen (PERCOCET) 7.5-325 MG per tablet Take 1 tablet by mouth 2 times daily as needed.  ibuprofen (ADVIL;MOTRIN) 200 MG tablet Take 400 mg by mouth every 8 hours as needed. No current facility-administered medications for this visit. Allergies   Allergen Reactions    Latex Shortness Of Breath     And hives    Fluzone [Influenza Vac Split Quad] Other (See Comments)     Letter in media     Sulfa Antibiotics Other (See Comments)     Eye ulcers from eye drops    Lead (Elemental) Rash       Subjective:      Review of Systems   Constitutional: Negative. Negative for activity change, appetite change, chills, diaphoresis, fatigue, fever and unexpected weight change. HENT: Negative. Negative for congestion, dental problem, drooling, ear discharge, ear pain, facial swelling, hearing loss, mouth sores, nosebleeds, postnasal drip, rhinorrhea, sinus pressure, sinus pain, sneezing, sore throat, tinnitus, trouble swallowing and voice change. Eyes: Negative. Negative for photophobia, pain, discharge, redness, itching and visual disturbance. Respiratory: Negative. Negative for apnea, cough, choking, chest tightness, shortness of breath, wheezing and stridor. Cardiovascular: Negative. Negative for chest pain, palpitations and leg swelling. Gastrointestinal: Positive for constipation and diarrhea. Negative for abdominal distention, abdominal pain, anal bleeding and blood in stool. Endocrine: Negative. Negative for cold intolerance, heat intolerance, polydipsia, polyphagia and polyuria. Genitourinary: Negative.   Negative for decreased urine volume, difficulty urinating, dyspareunia, dysuria, enuresis, flank pain, frequency, genital sores, hematuria, menstrual problem, pelvic pain, Normal range of motion. Neurological: She is alert and oriented to person, place, and time. Skin: Skin is warm and dry. No rash noted. No erythema. No pallor. Psychiatric: She has a normal mood and affect. Her behavior is normal. Judgment and thought content normal.          Results for orders placed or performed during the hospital encounter of 03/05/18   CBC Auto Differential   Result Value Ref Range    WBC 9.8 4.8 - 10.8 thou/mm3    RBC 4.86 4.20 - 5.40 mill/mm3    Hemoglobin 15.6 12.0 - 16.0 gm/dl    Hematocrit 45.1 37.0 - 47.0 %    MCV 92.8 81.0 - 99.0 fL    MCH 32.1 (H) 27.0 - 31.0 pg    MCHC 34.6 33.0 - 37.0 gm/dl    RDW 13.2 11.5 - 14.5 %    Platelets 655 909 - 225 thou/mm3    MPV 9.0 7.4 - 10.4 fL    Seg Neutrophils 70.0 %    Lymphocytes 20.8 %    Monocytes 8.5 %    Eosinophils 0.2 %    Basophils 0.5 %    nRBC 0 /100 wbc    Segs Absolute 6.9 1.8 - 7.7 thou/mm3    Lymphocytes # 2.0 1.0 - 4.8 thou/mm3    Monocytes # 0.8 0.4 - 1.3 thou/mm3    Eosinophils # 0.0 0.0 - 0.4 thou/mm3    Basophils # 0.0 0.0 - 0.1 thou/mm3       Assessment:     Positive Cologuard test in need of colonoscopy discussed procedure with the patient including the preparation the sedation and the risk of perforation she would like to proceed    Plan:     1. Schedule Est[facility] for colonoscopy  2. The risks, benefits and alternatives were discussed with [employer]. She understands and wishes to proceed with surgical intervention. 3. Restrictions discussed with [employer] and she expresses understanding. 4. She is advised to call back directly if there are further questions/concerns, or if her symptoms worsen prior to surgery.             Electronicallysigned by Jones Chi MD on 7/16/2019 at 10:39 AM

## 2019-07-16 NOTE — TELEPHONE ENCOUNTER
Scheduled Veronica Trujillo for a colonoscopy on Fri 8/2 at 8am & she will arrive at 7am. The prep instructions were given.

## 2019-08-02 ENCOUNTER — ANESTHESIA (OUTPATIENT)
Dept: ENDOSCOPY | Age: 59
End: 2019-08-02
Payer: COMMERCIAL

## 2019-08-02 ENCOUNTER — ANESTHESIA EVENT (OUTPATIENT)
Dept: ENDOSCOPY | Age: 59
End: 2019-08-02
Payer: COMMERCIAL

## 2019-08-02 ENCOUNTER — HOSPITAL ENCOUNTER (OUTPATIENT)
Age: 59
Setting detail: OUTPATIENT SURGERY
Discharge: HOME | End: 2019-08-02
Attending: SURGERY | Admitting: SURGERY
Payer: COMMERCIAL

## 2019-08-02 VITALS
DIASTOLIC BLOOD PRESSURE: 66 MMHG | SYSTOLIC BLOOD PRESSURE: 126 MMHG | OXYGEN SATURATION: 98 % | HEART RATE: 91 BPM | RESPIRATION RATE: 18 BRPM | WEIGHT: 200.2 LBS | BODY MASS INDEX: 30.34 KG/M2 | TEMPERATURE: 97 F | HEIGHT: 68 IN

## 2019-08-02 VITALS
DIASTOLIC BLOOD PRESSURE: 63 MMHG | SYSTOLIC BLOOD PRESSURE: 121 MMHG | OXYGEN SATURATION: 100 % | RESPIRATION RATE: 13 BRPM

## 2019-08-02 PROBLEM — Z12.11 ENCOUNTER FOR SCREENING COLONOSCOPY: Status: ACTIVE | Noted: 2018-05-04

## 2019-08-02 PROCEDURE — 88305 TISSUE EXAM BY PATHOLOGIST: CPT

## 2019-08-02 PROCEDURE — 7100000000 HC PACU RECOVERY - FIRST 15 MIN: Performed by: SURGERY

## 2019-08-02 PROCEDURE — 3609010600 HC COLONOSCOPY POLYPECTOMY SNARE/COLD BIOPSY: Performed by: SURGERY

## 2019-08-02 PROCEDURE — 2500000003 HC RX 250 WO HCPCS: Performed by: SPECIALIST

## 2019-08-02 PROCEDURE — 3700000000 HC ANESTHESIA ATTENDED CARE: Performed by: SURGERY

## 2019-08-02 PROCEDURE — 2709999900 HC NON-CHARGEABLE SUPPLY: Performed by: SURGERY

## 2019-08-02 PROCEDURE — 6360000002 HC RX W HCPCS: Performed by: SPECIALIST

## 2019-08-02 PROCEDURE — 3700000001 HC ADD 15 MINUTES (ANESTHESIA): Performed by: SURGERY

## 2019-08-02 PROCEDURE — 45385 COLONOSCOPY W/LESION REMOVAL: CPT | Performed by: SURGERY

## 2019-08-02 PROCEDURE — 45380 COLONOSCOPY AND BIOPSY: CPT | Performed by: SURGERY

## 2019-08-02 PROCEDURE — 2580000003 HC RX 258: Performed by: SURGERY

## 2019-08-02 PROCEDURE — 7100000001 HC PACU RECOVERY - ADDTL 15 MIN: Performed by: SURGERY

## 2019-08-02 RX ORDER — SODIUM CHLORIDE 450 MG/100ML
INJECTION, SOLUTION INTRAVENOUS CONTINUOUS
Status: DISCONTINUED | OUTPATIENT
Start: 2019-08-02 | End: 2019-08-02 | Stop reason: HOSPADM

## 2019-08-02 RX ORDER — GLYCOPYRROLATE 1 MG/5 ML
SYRINGE (ML) INTRAVENOUS PRN
Status: DISCONTINUED | OUTPATIENT
Start: 2019-08-02 | End: 2019-08-02 | Stop reason: SDUPTHER

## 2019-08-02 RX ORDER — ONDANSETRON 2 MG/ML
INJECTION INTRAMUSCULAR; INTRAVENOUS PRN
Status: DISCONTINUED | OUTPATIENT
Start: 2019-08-02 | End: 2019-08-02 | Stop reason: SDUPTHER

## 2019-08-02 RX ORDER — LIDOCAINE HYDROCHLORIDE 20 MG/ML
INJECTION, SOLUTION INFILTRATION; PERINEURAL PRN
Status: DISCONTINUED | OUTPATIENT
Start: 2019-08-02 | End: 2019-08-02 | Stop reason: SDUPTHER

## 2019-08-02 RX ORDER — PROPOFOL 10 MG/ML
INJECTION, EMULSION INTRAVENOUS PRN
Status: DISCONTINUED | OUTPATIENT
Start: 2019-08-02 | End: 2019-08-02 | Stop reason: SDUPTHER

## 2019-08-02 RX ADMIN — ONDANSETRON HYDROCHLORIDE 4 MG: 4 INJECTION, SOLUTION INTRAMUSCULAR; INTRAVENOUS at 07:30

## 2019-08-02 RX ADMIN — Medication 0.1 MG: at 08:05

## 2019-08-02 RX ADMIN — SODIUM CHLORIDE: 4.5 INJECTION, SOLUTION INTRAVENOUS at 07:18

## 2019-08-02 RX ADMIN — LIDOCAINE HYDROCHLORIDE 100 MG: 20 INJECTION, SOLUTION INFILTRATION; PERINEURAL at 08:06

## 2019-08-02 RX ADMIN — PROPOFOL 520 MG: 10 INJECTION, EMULSION INTRAVENOUS at 08:06

## 2019-08-02 ASSESSMENT — PAIN DESCRIPTION - PAIN TYPE: TYPE: CHRONIC PAIN

## 2019-08-02 ASSESSMENT — PAIN - FUNCTIONAL ASSESSMENT: PAIN_FUNCTIONAL_ASSESSMENT: 0-10

## 2019-08-02 ASSESSMENT — PAIN DESCRIPTION - LOCATION: LOCATION: BACK

## 2019-08-02 ASSESSMENT — PAIN SCALES - GENERAL: PAINLEVEL_OUTOF10: 10

## 2019-08-02 ASSESSMENT — COPD QUESTIONNAIRES: CAT_SEVERITY: MODERATE

## 2019-08-02 ASSESSMENT — ENCOUNTER SYMPTOMS: SHORTNESS OF BREATH: 1

## 2019-08-02 NOTE — ANESTHESIA PRE PROCEDURE
Department of Anesthesiology  Preprocedure Note       Name:  Laura Jordan   Age:  59 y.o.  :  1960                                          MRN:  [id number]         Date:  2019      Surgeon: Tam Rivera):  Jones Chi MD    Procedure: COLONOSCOPY (Left Anus)    Medications prior to admission:   Prior to Admission medications    Medication Sig Start Date End Date Taking? Authorizing Provider   albuterol sulfate  (90 Base) MCG/ACT inhaler INHALE 2 PUFFS BY MOUTH INTO THE LUNGS EVERY 6 HOURS AS NEEDED FOR WHEEZING 19  Yes Nidia Odonnell MD   ranitidine (ZANTAC) 300 MG tablet TAKE 1 TABLET BY MOUTH ONE TIME A DAY 19  Yes Nidia Odonnell MD   nicotine polacrilex (EQL NICOTINE) 4 MG lozenge Take 1 lozenge by mouth as needed for Smoking cessation 18  Yes Nidia Odonnell MD   Cyclobenzaprine HCl (FLEXERIL PO) Take  by mouth nightly. Yes Historical Provider, MD   oxyCODONE-acetaminophen (PERCOCET) 7.5-325 MG per tablet Take 1 tablet by mouth 2 times daily as needed. Yes Historical Provider, MD   Pseudoephedrine-Guaifenesin (MUCINEX D PO) Take by mouth    Historical Provider, MD   ibuprofen (ADVIL;MOTRIN) 200 MG tablet Take 400 mg by mouth every 8 hours as needed. Historical Provider, MD       Current medications:    Current Facility-Administered Medications   Medication Dose Route Frequency Provider Last Rate Last Dose    0.45 % sodium chloride infusion   Intravenous Continuous Jones Chi  mL/hr at 19 7259         Allergies:     Allergies   Allergen Reactions    Latex Shortness Of Breath     And hives    Dye [Barium-Containing Compounds]     Fluzone [Influenza Vac Split Quad] Other (See Comments)     Letter in media     Sulfa Antibiotics Other (See Comments)     Eye ulcers from eye drops    Lead (Elemental) Rash       Problem List:    Patient Active Problem List   Diagnosis Code    Chronic pain syndrome G89.4    Lumbar disc disease, herniated L5-S1 M51.9  Chronic gastritis K29.50    Cephalgia, intermittant R51    Allergic rhinitis J30.9    Lumbar post-laminectomy syndrome M96.1    Medication monitoring encounter Z51.81    IFG (impaired fasting glucose) R73.01    Chronic cough, family hx - COPD R05    [address]lobular emphysema ([facility] [address] J43.1    Encounter for smoking cessation counseling Z71.6       Past Medical History:        Diagnosis Date    Allergic rhinitis     Chronic back pain     Headache(784.0)     Panlobular emphysema ([address] 3/15/2018       Past Surgical History:        Procedure Laterality Date    BACK SURGERY      SPINE SURGERY  ,     [facility], [address]       Social History:    Social History     Tobacco Use    Smoking status: Former Smoker     Packs/day: 0.50     Years: 40.00     Pack years: 20.00     Types: Cigarettes     Start date: 1978     Last attempt to quit: 2018     Years since quittin.3    Smokeless tobacco: Never Used   Substance Use Topics    Alcohol use: No     Alcohol/week: 0.0 standard drinks                                Counseling given: Not Answered      Vital Signs (Current):   Vitals:    19 0711   BP: 136/62   Pulse: 116   Resp: 18   Temp: 36.4 °C (97.5 °F)   TempSrc: Temporal   SpO2: 96%   Weight: 200 lb 3.2 oz (90.8 kg)   Height: 5' 8\" (1.727 m)                                              BP Readings from Last 3 Encounters:   19 136/62   19 128/80   19 126/72       NPO Status: Time of last liquid consumption:                         Time of last solid consumption:                         Date of last liquid consumption: 19                        Date of last solid food consumption: 19    BMI:   Wt Readings from Last 3 Encounters:   19 200 lb 3.2 oz (90.8 kg)   19 205 lb (93 kg)   19 197 lb 9.6 oz (89.6 kg)     Body mass index is 30.44 kg/m².     CBC:   Lab Results   Component Value Date    WBC 9.8 2018    RBC 4.86 2018    HGB 15.6

## 2019-08-02 NOTE — OP NOTE
[address] [place] [address]                                OPERATIVE REPORT    PATIENT NAME: Yara Pineda                   :        1960  MED REC NO:   [id number]                           ROOM:  ACCOUNT NO:   [de-identified]                           ADMIT DATE: 2019  PROVIDER:     Benita Lang. Wendy Love MD    DATE OF PROCEDURE:  2019    PREOPERATIVE DIAGNOSIS:  Need for screening colonoscopy. POSTOPERATIVE DIAGNOSES:  1. Colon polyps x4.  2.  Sigmoid diverticulosis. OPERATIONS:  Colonoscopy with polypectomy x3 with the cold forceps,  polypectomy x1 with the hot snare. SURGEON:  Benita Lang. Wendy Love MD    ANESTHESIA:  Per nurse anesthesia. COMPLICATIONS:  None. INDICATION FOR PROCEDURE:  The patient is a 25-year-old white female in  need of a screening colonoscopy. FINDINGS:  The patient did have scattered diverticular disease mostly in  the descending colon. The patient had two hepatic flexure polyps,  small, removed; one transverse colon polyp, removed; and then a larger  colon polyp in the sigmoid, was removed with a snare. DESCRIPTION OF PROCEDURE:  The patient was brought to the endoscopy  suite, placed in the left lateral decubitus position. After adequate IV  anesthetic with Diprivan was given, the Olympus endoscope was inserted  into the anus, easily passed up through the rectum up through the  sigmoid up through the descending colon across the transverse colon into  the cecum. Picture of the cecum was taken. The scope was then  withdrawn. It should be noted throughout the procedure as the scope was  withdrawn, it would be reinserted to view the preceding area. At the  hepatic flexure there were two small polyps, separately removed with the  cold forceps.   Back in the transverse colon another small polyp was  removed and the scope was withdrawn back around the splenic flexure down  the descending colon, where we did encounter diverticular disease. However, in the middle of the sigmoid at about 60 cm was a rather large  polyp, it was removed with a hot snare and retrieved. Other than  diverticula, the rest of the sigmoid and rectum were normal.  The  patient tolerated the procedure well. BRIAN CHAMBERLAIN [facility] TREAT[facility], MD    D: 08/02/2019 10:50:30       T: 08/02/2019 11:01:02     [id number]  Job#: [id number]     Doc#: [id number]    CC:

## 2019-08-15 ENCOUNTER — OFFICE VISIT (OUTPATIENT)
Dept: SURGERY | Age: 59
End: 2019-08-15
Payer: COMMERCIAL

## 2019-08-15 VITALS
SYSTOLIC BLOOD PRESSURE: 120 MMHG | TEMPERATURE: 98 F | OXYGEN SATURATION: 98 % | WEIGHT: 205 LBS | RESPIRATION RATE: 16 BRPM | DIASTOLIC BLOOD PRESSURE: 80 MMHG | BODY MASS INDEX: 31.07 KG/M2 | HEIGHT: 68 IN | HEART RATE: 80 BPM

## 2019-08-15 DIAGNOSIS — Z98.890 S/P COLONOSCOPY WITH POLYPECTOMY: Primary | ICD-10-CM

## 2019-08-15 PROCEDURE — 99212 OFFICE O/P EST SF 10 MIN: CPT | Performed by: SURGERY

## 2019-08-15 ASSESSMENT — ENCOUNTER SYMPTOMS
RESPIRATORY NEGATIVE: 1
ANAL BLEEDING: 0
ABDOMINAL PAIN: 0
ALLERGIC/IMMUNOLOGIC NEGATIVE: 1
CHEST TIGHTNESS: 0
TROUBLE SWALLOWING: 0
VOICE CHANGE: 0
GASTROINTESTINAL NEGATIVE: 1
SINUS PAIN: 0
CHOKING: 0
APNEA: 0
BACK PAIN: 0
COUGH: 0
RHINORRHEA: 0
EYE DISCHARGE: 0
SORE THROAT: 0
ABDOMINAL DISTENTION: 0
FACIAL SWELLING: 0
WHEEZING: 0
PHOTOPHOBIA: 0
COLOR CHANGE: 0
EYE PAIN: 0
STRIDOR: 0
EYE ITCHING: 0
BLOOD IN STOOL: 0
SINUS PRESSURE: 0
EYE REDNESS: 0
SHORTNESS OF BREATH: 0
EYES NEGATIVE: 1

## 2019-08-15 NOTE — PROGRESS NOTES
Normal range of motion. Neurological: She is alert and oriented to person, place, and time. Skin: Skin is warm and dry. No rash noted. No erythema. No pallor. Psychiatric: She has a normal mood and affect.  Her behavior is normal. Judgment and thought content normal.          Results for orders placed or performed during the hospital encounter of 03/05/18   CBC Auto Differential   Result Value Ref Range    WBC 9.8 4.8 - 10.8 thou/mm3    RBC 4.86 4.20 - 5.40 mill/mm3    Hemoglobin 15.6 12.0 - 16.0 gm/dl    Hematocrit 45.1 37.0 - 47.0 %    MCV 92.8 81.0 - 99.0 fL    MCH 32.1 (H) 27.0 - 31.0 pg    MCHC 34.6 33.0 - 37.0 gm/dl    RDW 13.2 11.5 - 14.5 %    Platelets 304 578 - 101 thou/mm3    MPV 9.0 7.4 - 10.4 fL    Seg Neutrophils 70.0 %    Lymphocytes 20.8 %    Monocytes 8.5 %    Eosinophils 0.2 %    Basophils 0.5 %    nRBC 0 /100 wbc    Segs Absolute 6.9 1.8 - 7.7 thou/mm3    Lymphocytes # 2.0 1.0 - 4.8 thou/mm3    Monocytes # 0.8 0.4 - 1.3 thou/mm3    Eosinophils # 0.0 0.0 - 0.4 thou/mm3    Basophils # 0.0 0.0 - 0.1 thou/mm3       Assessment:     Status post colonoscopy with multiple polyps found discussed pathology of tubular adenoma this was her first colonoscopy and would recommend another colonoscopy in 5 years patient also had diverticulosis and we discussed diverticular disease with the patient discussed with the patient did she does not have to alter her diet at this point in time    Plan:     Return to office in 5 years for repeat colonoscopy      Electronicallysigned by Karly Day MD on 8/15/2019 at 11:03 AM

## 2019-08-20 DIAGNOSIS — R19.5 POSITIVE COLORECTAL CANCER SCREENING USING COLOGUARD TEST: ICD-10-CM

## 2019-08-22 RX ORDER — RANITIDINE 300 MG/1
TABLET ORAL
Qty: 30 TABLET | Refills: 6 | Status: SHIPPED | OUTPATIENT
Start: 2019-08-22 | End: 2020-02-13

## 2019-08-23 RX ORDER — RANITIDINE 300 MG/1
TABLET ORAL
Qty: 30 TABLET | Refills: 6 | Status: SHIPPED | OUTPATIENT
Start: 2019-08-23 | End: 2019-11-07 | Stop reason: ALTCHOICE

## 2019-09-01 PROBLEM — Z12.11 ENCOUNTER FOR SCREENING COLONOSCOPY: Status: RESOLVED | Noted: 2018-05-04 | Resolved: 2019-09-01

## 2019-11-07 ENCOUNTER — OFFICE VISIT (OUTPATIENT)
Dept: FAMILY MEDICINE CLINIC | Age: 59
End: 2019-11-07
Payer: COMMERCIAL

## 2019-11-07 VITALS
RESPIRATION RATE: 24 BRPM | HEART RATE: 106 BPM | DIASTOLIC BLOOD PRESSURE: 68 MMHG | SYSTOLIC BLOOD PRESSURE: 122 MMHG | WEIGHT: 209.8 LBS | OXYGEN SATURATION: 98 % | BODY MASS INDEX: 31.9 KG/M2

## 2019-11-07 DIAGNOSIS — K29.30 CHRONIC SUPERFICIAL GASTRITIS WITHOUT BLEEDING: Primary | ICD-10-CM

## 2019-11-07 DIAGNOSIS — J43.1 PANLOBULAR EMPHYSEMA (HCC): ICD-10-CM

## 2019-11-07 DIAGNOSIS — M96.1 LUMBAR POST-LAMINECTOMY SYNDROME: ICD-10-CM

## 2019-11-07 DIAGNOSIS — Z51.81 MEDICATION MONITORING ENCOUNTER: ICD-10-CM

## 2019-11-07 DIAGNOSIS — M51.9 LUMBAR DISC DISEASE: ICD-10-CM

## 2019-11-07 DIAGNOSIS — G89.4 CHRONIC PAIN SYNDROME: ICD-10-CM

## 2019-11-07 DIAGNOSIS — R73.01 IFG (IMPAIRED FASTING GLUCOSE): ICD-10-CM

## 2019-11-07 PROCEDURE — 99214 OFFICE O/P EST MOD 30 MIN: CPT | Performed by: FAMILY MEDICINE

## 2019-11-07 ASSESSMENT — ENCOUNTER SYMPTOMS
RESPIRATORY NEGATIVE: 1
BACK PAIN: 1
SHORTNESS OF BREATH: 0
WHEEZING: 0
ALLERGIC/IMMUNOLOGIC NEGATIVE: 1

## 2019-11-09 ENCOUNTER — HOSPITAL ENCOUNTER (OUTPATIENT)
Age: 59
Discharge: HOME | End: 2019-11-09
Payer: COMMERCIAL

## 2019-11-09 DIAGNOSIS — M96.1 LUMBAR POST-LAMINECTOMY SYNDROME: ICD-10-CM

## 2019-11-09 DIAGNOSIS — R73.01 IFG (IMPAIRED FASTING GLUCOSE): ICD-10-CM

## 2019-11-09 DIAGNOSIS — Z51.81 MEDICATION MONITORING ENCOUNTER: ICD-10-CM

## 2019-11-09 DIAGNOSIS — J43.1 PANLOBULAR EMPHYSEMA (HCC): ICD-10-CM

## 2019-11-09 DIAGNOSIS — K29.30 CHRONIC SUPERFICIAL GASTRITIS WITHOUT BLEEDING: ICD-10-CM

## 2019-11-09 LAB
ALBUMIN SERPL-MCNC: 3.9 G/DL (ref 3.5–5.1)
ALP BLD-CCNC: 73 U/L (ref 38–126)
ALT SERPL-CCNC: 24 U/L (ref 11–66)
ANION GAP SERPL CALCULATED.3IONS-SCNC: 12 MEQ/L (ref 8–16)
AST SERPL-CCNC: 24 U/L (ref 5–40)
BACTERIA: ABNORMAL
BASOPHILS # BLD: 0.8 %
BASOPHILS ABSOLUTE: 0 THOU/MM3 (ref 0–0.1)
BILIRUB SERPL-MCNC: 0.4 MG/DL (ref 0.3–1.2)
BILIRUBIN URINE: NEGATIVE
BLOOD, URINE: NEGATIVE
BUN BLDV-MCNC: 9 MG/DL (ref 7–22)
CALCIUM SERPL-MCNC: 9.1 MG/DL (ref 8.5–10.5)
CASTS: ABNORMAL /LPF
CASTS: ABNORMAL /LPF
CHARACTER, URINE: ABNORMAL
CHLORIDE BLD-SCNC: 103 MEQ/L (ref 98–111)
CO2: 26 MEQ/L (ref 23–33)
COLOR: YELLOW
CREAT SERPL-MCNC: 0.7 MG/DL (ref 0.4–1.2)
CRYSTALS: ABNORMAL
EOSINOPHIL # BLD: 2.1 %
EOSINOPHILS ABSOLUTE: 0.1 THOU/MM3 (ref 0–0.4)
EPITHELIAL CELLS, UA: ABNORMAL /HPF
ERYTHROCYTE [DISTWIDTH] IN BLOOD BY AUTOMATED COUNT: 13 % (ref 11.5–14.5)
ERYTHROCYTE [DISTWIDTH] IN BLOOD BY AUTOMATED COUNT: 45.5 FL (ref 35–45)
GFR SERPL CREATININE-BSD FRML MDRD: 85 ML/MIN/1.73M2
GLUCOSE FASTING: 132 MG/DL (ref 70–108)
GLUCOSE, URINE: NEGATIVE MG/DL
HCT VFR BLD CALC: 43.2 % (ref 37–47)
HEMOGLOBIN: 13.8 GM/DL (ref 12–16)
IMMATURE GRANS (ABS): 0.02 THOU/MM3 (ref 0–0.07)
IMMATURE GRANULOCYTES: 0.4 %
KETONES, URINE: NEGATIVE
LEUKOCYTE EST, POC: ABNORMAL
LYMPHOCYTES # BLD: 26.8 %
LYMPHOCYTES ABSOLUTE: 1.4 THOU/MM3 (ref 1–4.8)
MCH RBC QN AUTO: 30.6 PG (ref 26–33)
MCHC RBC AUTO-ENTMCNC: 31.9 GM/DL (ref 32.2–35.5)
MCV RBC AUTO: 95.8 FL (ref 81–99)
MISCELLANEOUS LAB TEST RESULT: ABNORMAL
MONOCYTES # BLD: 8.5 %
MONOCYTES ABSOLUTE: 0.5 THOU/MM3 (ref 0.4–1.3)
NITRITE, URINE: NEGATIVE
NUCLEATED RED BLOOD CELLS: 0 /100 WBC
PH UA: 6 (ref 5–9)
PLATELET # BLD: 257 THOU/MM3 (ref 130–400)
PMV BLD AUTO: 10.1 FL (ref 9.4–12.4)
POTASSIUM SERPL-SCNC: 4.4 MEQ/L (ref 3.5–5.2)
PROTEIN UA: ABNORMAL MG/DL
RBC # BLD: 4.51 MILL/MM3 (ref 4.2–5.4)
RBC URINE: ABNORMAL /HPF
RENAL EPITHELIAL, UA: ABNORMAL
SEG NEUTROPHILS: 61.4 %
SEGMENTED NEUTROPHILS ABSOLUTE COUNT: 3.3 THOU/MM3 (ref 1.8–7.7)
SODIUM BLD-SCNC: 141 MEQ/L (ref 135–145)
SPECIFIC GRAVITY UA: 1.01 (ref 1–1.03)
TOTAL PROTEIN: 7 G/DL (ref 6.1–8)
UROBILINOGEN, URINE: 0.2 EU/DL (ref 0–1)
WBC # BLD: 5.3 THOU/MM3 (ref 4.8–10.8)
WBC UA: ABNORMAL /HPF
YEAST: ABNORMAL

## 2019-11-09 PROCEDURE — 36415 COLL VENOUS BLD VENIPUNCTURE: CPT

## 2019-11-09 PROCEDURE — 85025 COMPLETE CBC W/AUTO DIFF WBC: CPT

## 2019-11-09 PROCEDURE — 81001 URINALYSIS AUTO W/SCOPE: CPT

## 2019-11-09 PROCEDURE — 80053 COMPREHEN METABOLIC PANEL: CPT

## 2019-12-10 DIAGNOSIS — R05.9 COUGH: ICD-10-CM

## 2019-12-10 RX ORDER — ALBUTEROL SULFATE 90 UG/1
AEROSOL, METERED RESPIRATORY (INHALATION)
Qty: 18 G | Refills: 3 | Status: SHIPPED | OUTPATIENT
Start: 2019-12-10 | End: 2020-07-13

## 2020-02-13 RX ORDER — RANITIDINE 300 MG/1
TABLET ORAL
Qty: 30 TABLET | Refills: 6 | Status: SHIPPED
Start: 2020-02-13 | End: 2020-05-11 | Stop reason: ALTCHOICE

## 2020-04-20 ENCOUNTER — TELEPHONE (OUTPATIENT)
Dept: FAMILY MEDICINE CLINIC | Age: 60
End: 2020-04-20

## 2020-05-11 ENCOUNTER — TELEPHONE (OUTPATIENT)
Dept: FAMILY MEDICINE CLINIC | Age: 60
End: 2020-05-11

## 2020-05-11 RX ORDER — SUCRALFATE 1 G/1
1 TABLET ORAL 4 TIMES DAILY
Qty: 120 TABLET | Refills: 3 | Status: SHIPPED | OUTPATIENT
Start: 2020-05-11 | End: 2020-08-07

## 2020-05-11 RX ORDER — FAMOTIDINE 20 MG/1
20 TABLET, FILM COATED ORAL 2 TIMES DAILY
Qty: 60 TABLET | Refills: 3 | Status: SHIPPED
Start: 2020-05-11 | End: 2020-07-09 | Stop reason: SINTOL

## 2020-05-11 NOTE — TELEPHONE ENCOUNTER
Pt was notified and voiced understanding. Although pt says she used Pepcid in the past and it gave her more indigestion. Pt is asking for the Carafate 4x daily be sent to the pharmacy. She would like to give it a try. If no call back, pt will check with the pharmacy later today. Please advise.

## 2020-07-09 ENCOUNTER — OFFICE VISIT (OUTPATIENT)
Dept: FAMILY MEDICINE CLINIC | Age: 60
End: 2020-07-09
Payer: COMMERCIAL

## 2020-07-09 VITALS
RESPIRATION RATE: 16 BRPM | BODY MASS INDEX: 31.31 KG/M2 | SYSTOLIC BLOOD PRESSURE: 138 MMHG | HEART RATE: 80 BPM | DIASTOLIC BLOOD PRESSURE: 70 MMHG | WEIGHT: 205.9 LBS | TEMPERATURE: 97.3 F

## 2020-07-09 LAB — HBA1C MFR BLD: 5.6 %

## 2020-07-09 PROCEDURE — 83036 HEMOGLOBIN GLYCOSYLATED A1C: CPT | Performed by: FAMILY MEDICINE

## 2020-07-09 PROCEDURE — 99214 OFFICE O/P EST MOD 30 MIN: CPT | Performed by: FAMILY MEDICINE

## 2020-07-09 SDOH — ECONOMIC STABILITY: FOOD INSECURITY: WITHIN THE PAST 12 MONTHS, THE FOOD YOU BOUGHT JUST DIDN'T LAST AND YOU DIDN'T HAVE MONEY TO GET MORE.: NEVER TRUE

## 2020-07-09 SDOH — ECONOMIC STABILITY: INCOME INSECURITY: HOW HARD IS IT FOR YOU TO PAY FOR THE VERY BASICS LIKE FOOD, HOUSING, MEDICAL CARE, AND HEATING?: NOT HARD AT ALL

## 2020-07-09 SDOH — ECONOMIC STABILITY: TRANSPORTATION INSECURITY
IN THE PAST 12 MONTHS, HAS LACK OF TRANSPORTATION KEPT YOU FROM MEETINGS, WORK, OR FROM GETTING THINGS NEEDED FOR DAILY LIVING?: NO

## 2020-07-09 SDOH — ECONOMIC STABILITY: FOOD INSECURITY: WITHIN THE PAST 12 MONTHS, YOU WORRIED THAT YOUR FOOD WOULD RUN OUT BEFORE YOU GOT MONEY TO BUY MORE.: NEVER TRUE

## 2020-07-09 SDOH — ECONOMIC STABILITY: TRANSPORTATION INSECURITY
IN THE PAST 12 MONTHS, HAS THE LACK OF TRANSPORTATION KEPT YOU FROM MEDICAL APPOINTMENTS OR FROM GETTING MEDICATIONS?: NO

## 2020-07-09 ASSESSMENT — ENCOUNTER SYMPTOMS
WHEEZING: 1
SHORTNESS OF BREATH: 1
GASTROINTESTINAL NEGATIVE: 1
ALLERGIC/IMMUNOLOGIC NEGATIVE: 1
BACK PAIN: 1

## 2020-07-09 ASSESSMENT — PATIENT HEALTH QUESTIONNAIRE - PHQ9
SUM OF ALL RESPONSES TO PHQ QUESTIONS 1-9: 0
1. LITTLE INTEREST OR PLEASURE IN DOING THINGS: 0
SUM OF ALL RESPONSES TO PHQ QUESTIONS 1-9: 0
2. FEELING DOWN, DEPRESSED OR HOPELESS: 0
SUM OF ALL RESPONSES TO PHQ9 QUESTIONS 1 & 2: 0

## 2020-07-09 NOTE — PATIENT INSTRUCTIONS
You may receive a survey about your visit with us today. The feedback from our patients helps us identify what is working well and where the service to all patients can be enhanced. Thank you! Continue present medications. Stay as mobile as able. Continue follow up with pain management.

## 2020-07-09 NOTE — PROGRESS NOTES
Subjective:      Patient ID: Miguel Navarrete is a 62 y.o. female. HPI  Follow up of chronic conditions. Encounter Diagnoses   Name Primary?  Encounter for screening mammogram for breast cancer     Chronic pain syndrome     Lumbar post-laminectomy syndrome     Panlobular emphysema (HCC) Yes    IFG (impaired fasting glucose)     Chronic superficial gastritis without bleeding     Nonintractable episodic headache, unspecified headache type     Medication monitoring encounter      Patient continues to follow-up with pain management clinic dealing with her lumbar and sacroiliac joint issues. She gets relief for about a month after either epidural or SI joint injections. Then she deals with the pain that at times becomes almost incapacitating for the subsequent 3 months. She is trying to make modifications in terms of level of physical activity and positioning to complete tasks at home. She is doing her best to maintain her weight to avoid becoming diabetic. She is prediabetic currently with a current hemoglobin A1c of 5.6% which is up from a previous 5.4% several years ago. She has no current symptoms to suggest dealing with type 2 diabetes. Wt Readings from Last 3 Encounters:   07/09/20 205 lb 14.4 oz (93.4 kg)   11/07/19 209 lb 12.8 oz (95.2 kg)   08/15/19 205 lb (93 kg)   Body mass index is 31.31 kg/m². Current A1c 5.6%  Lab Results   Component Value Date    LABA1C 5.4 03/24/2017     No results found for: EAG    Her emphysema has been exacerbated by having to wear a mask at work. She finds herself wheezing more and having to use her rescue inhaler much more frequently. The need to use her mask as little as possible was discussed with her because of it causing a drop in O2 levels and rise in CO2.   She is also noted increasing frequency of headaches with mask used which unfortunately is not unusual.  Her employer at least allows her to take it off when she is at her desk and no one is around. The rest of this patient's conditions are stable. Past medical and surgical hx reviewed. Past Medical History:   Diagnosis Date    Allergic rhinitis     Chronic back pain     Headache(784.0)     Panlobular emphysema ([address] 3/15/2018     Past Surgical History:   Procedure Laterality Date    BACK SURGERY      COLONOSCOPY Left 8/2/2019    COLONOSCOPY POLYPECTOMY SNARE/COLD BIOPSY performed by Trisha Castillo MD at [address] of this note were completed with a voice recording program.  Efforts were made to edit the dictations but occasionally words are mis-transcribed. Review of Systems   Constitutional: Negative. HENT: Negative. Respiratory: Positive for shortness of breath and wheezing. See HPI. Cardiovascular: Negative. Negative for leg swelling. Gastrointestinal: Negative. Endocrine: Negative. Genitourinary: Negative. Musculoskeletal: Positive for arthralgias, back pain and gait problem. Allergic/Immunologic: Negative. Neurological: Positive for headaches. Hematological: Negative. Psychiatric/Behavioral: Negative. All other systems reviewed and are negative. Objective:   Physical Exam  Vitals signs and nursing note reviewed. HENT:      Right Ear: Tympanic membrane, ear canal and external ear normal.      Left Ear: Tympanic membrane, ear canal and external ear normal.      Nose: No congestion. Mouth/Throat:      Mouth: Mucous membranes are moist.      Pharynx: Oropharynx is clear. Eyes:      Conjunctiva/sclera: Conjunctivae normal.   Neck:      Musculoskeletal: No muscular tenderness. Cardiovascular:      Rate and Rhythm: Normal rate and regular rhythm. Pulses: Normal pulses. Heart sounds: Normal heart sounds. No murmur. No gallop. Pulmonary:      Effort: Pulmonary effort is normal.      Breath sounds: Normal breath sounds and air entry.  No decreased air movement or transmitted upper airway sounds. No decreased breath sounds, wheezing, rhonchi or rales. Abdominal:      General: Bowel sounds are normal.   Musculoskeletal:      Lumbar back: She exhibits decreased range of motion, tenderness and pain. She exhibits no spasm. Right lower leg: No edema. Left lower leg: No edema. Skin:     General: Skin is warm and dry. Capillary Refill: Capillary refill takes less than 2 seconds. Neurological:      General: No focal deficit present. Mental Status: She is alert and oriented to person, place, and time. Psychiatric:         Mood and Affect: Mood normal.         Assessment:       Diagnosis Orders   1. Panlobular emphysema ([facility] [address]     2. Encounter for screening mammogram for breast cancer     3. Chronic pain syndrome     4. Lumbar post-laminectomy syndrome     5. IFG (impaired fasting glucose)  POCT glycosylated hemoglobin (Hb A1C)   6. Chronic superficial gastritis without bleeding     7. Nonintractable episodic headache, unspecified headache type     8. Medication monitoring encounter             Plan:      Orders Placed This Encounter   Procedures    POCT glycosylated hemoglobin (Hb A1C)     Current Outpatient Medications   Medication Sig Dispense Refill    albuterol sulfate  (90 Base) MCG/ACT inhaler INHALE 2 PUFFS BY MOUTH INTO THE LUNGS EVERY 6 HOURS AS NEEDED FOR WHEEZING 18 g 3    nicotine polacrilex (EQL NICOTINE) 4 MG lozenge Take 1 lozenge by mouth as needed for Smoking cessation 100 each 0    Cyclobenzaprine HCl (FLEXERIL PO) Take  by mouth nightly.  oxyCODONE-acetaminophen (PERCOCET) 7.5-325 MG per tablet Take 1 tablet by mouth 2 times daily as needed.  ibuprofen (ADVIL;MOTRIN) 200 MG tablet Take 400 mg by mouth every 8 hours as needed.         sucralfate (CARAFATE) 1 GM tablet Take 1 tablet by mouth 4 times daily (Patient not taking: Reported on 7/9/2020) 120 tablet 3    Pseudoephedrine-Guaifenesin (MUCINEX D PO) Take by mouth       No current facility-administered medications for this visit. There are no discontinued medications. Continue present medications. Stay as mobile as able. Continue follow up with pain management. Discussed use, benefit, and side effects of prescribed medications. Barriers to compliance discussed. All patient questions answered. Pt voiced understanding.           Chano Rebolledo MD

## 2020-07-09 NOTE — PROGRESS NOTES
Chronic Disease Visit Information    BP Readings from Last 3 Encounters:   07/09/20 138/70   11/07/19 122/68   08/15/19 120/80          Hemoglobin A1C (%)   Date Value   03/24/2017 5.4     LDL Calculated (mg/dl)   Date Value   02/18/2017 95     HDL (mg/dl)   Date Value   02/18/2017 33     BUN (mg/dL)   Date Value   11/09/2019 9     CREATININE (mg/dL)   Date Value   11/09/2019 0.7     Glucose (mg/dl)   Date Value   02/18/2017 128 (H)            Have you changed or started any medications since your last visit including any over-the-counter medicines, vitamins, or herbal medicines? no   Are you having any side effects from any of your medications? -  no  Have you stopped taking any of your medications? Is so, why? -  no    Have you seen any other physician or provider since your last visit? Yes - Records Obtained  Have you had any other diagnostic tests since your last visit? No  Have you been seen in the emergency room and/or had an admission to a hospital since we last saw you? No  Have you had your annual diabetic retinal (eye) exam? No  Have you had your routine dental cleaning in the past 6 months? yes - 5/2020    Have you activated your [employer] account? If not, what are your barriers?  No: declines     Patient Care Team:  Odalys Simmons MD as PCP - General (Family Medicine)  Odalys Simmons MD as PCP - [facility] Provider  Renuka Carroll as Consulting Physician (Pain Management)         Medical History Review  Past Medical, Family, and Social History reviewed and does contribute to the patient presenting condition    Health Maintenance   Topic Date Due    Hepatitis C screen  1960    Pneumococcal 0-64 years Vaccine (1 of 1 - PPSV23) 09/01/1964    HIV screen  09/01/1973    DTaP/Tdap/Td vaccine (1 - Tdap) 09/01/1977    Shingles Vaccine (1 of 2) 09/01/2008    A1C test (Diabetic or Prediabetic)  03/24/2018    Cervical cancer screen  07/21/2018    Breast cancer screen  04/30/2020    Lipid screen 02/18/2022    Colon cancer screen colonoscopy  08/02/2024    Hepatitis A vaccine  Aged Out    Hepatitis B vaccine  Aged Out    Hib vaccine  Aged Out    Meningococcal (ACWY) vaccine  Aged Out

## 2020-07-13 RX ORDER — ALBUTEROL SULFATE 90 UG/1
AEROSOL, METERED RESPIRATORY (INHALATION)
Qty: 18 G | Refills: 3 | Status: SHIPPED | OUTPATIENT
Start: 2020-07-13

## 2020-08-07 ENCOUNTER — HOSPITAL ENCOUNTER (INPATIENT)
Age: 60
LOS: 11 days | Discharge: NURSING, REHAB OR LONG-TERM CARE | DRG: 853 | End: 2020-08-18
Attending: EMERGENCY MEDICINE | Admitting: INTERNAL MEDICINE
Payer: COMMERCIAL

## 2020-08-07 ENCOUNTER — APPOINTMENT (OUTPATIENT)
Dept: GENERAL RADIOLOGY | Age: 60
DRG: 853 | End: 2020-08-07
Payer: COMMERCIAL

## 2020-08-07 ENCOUNTER — APPOINTMENT (OUTPATIENT)
Dept: ULTRASOUND IMAGING | Age: 60
DRG: 853 | End: 2020-08-07
Payer: COMMERCIAL

## 2020-08-07 ENCOUNTER — APPOINTMENT (OUTPATIENT)
Dept: CT IMAGING | Age: 60
DRG: 853 | End: 2020-08-07
Payer: COMMERCIAL

## 2020-08-07 PROBLEM — N83.8 OVARIAN MASS: Status: ACTIVE | Noted: 2020-08-07

## 2020-08-07 LAB
ALBUMIN SERPL-MCNC: 2.9 G/DL (ref 3.5–5.1)
ALP BLD-CCNC: 97 U/L (ref 38–126)
ALT SERPL-CCNC: 12 U/L (ref 11–66)
AMORPHOUS: ABNORMAL
ANION GAP SERPL CALCULATED.3IONS-SCNC: 12 MEQ/L (ref 8–16)
AST SERPL-CCNC: 14 U/L (ref 5–40)
BACTERIA: ABNORMAL /HPF
BASOPHILS # BLD: 0.3 %
BASOPHILS ABSOLUTE: 0 THOU/MM3 (ref 0–0.1)
BILIRUB SERPL-MCNC: 0.6 MG/DL (ref 0.3–1.2)
BILIRUBIN DIRECT: 0.3 MG/DL (ref 0–0.3)
BILIRUBIN URINE: ABNORMAL
BLOOD, URINE: ABNORMAL
BUN BLDV-MCNC: 19 MG/DL (ref 7–22)
CALCIUM SERPL-MCNC: 8.5 MG/DL (ref 8.5–10.5)
CASTS 2: ABNORMAL /LPF
CASTS UA: ABNORMAL /LPF
CHARACTER, URINE: ABNORMAL
CHLAMYDIA TRACHOMATIS BY RT-PCR: NOT DETECTED
CHLORIDE BLD-SCNC: 99 MEQ/L (ref 98–111)
CO2: 23 MEQ/L (ref 23–33)
COLOR: ABNORMAL
CREAT SERPL-MCNC: 0.9 MG/DL (ref 0.4–1.2)
CRYSTALS, UA: ABNORMAL
CT/NG SOURCE: NORMAL
EKG ATRIAL RATE: 129 BPM
EKG P AXIS: 78 DEGREES
EKG P-R INTERVAL: 122 MS
EKG Q-T INTERVAL: 300 MS
EKG QRS DURATION: 104 MS
EKG QTC CALCULATION (BAZETT): 439 MS
EKG R AXIS: 75 DEGREES
EKG T AXIS: 71 DEGREES
EKG VENTRICULAR RATE: 129 BPM
EOSINOPHIL # BLD: 0.2 %
EOSINOPHILS ABSOLUTE: 0 THOU/MM3 (ref 0–0.4)
EPITHELIAL CELLS, UA: ABNORMAL /HPF
ERYTHROCYTE [DISTWIDTH] IN BLOOD BY AUTOMATED COUNT: 12.5 % (ref 11.5–14.5)
ERYTHROCYTE [DISTWIDTH] IN BLOOD BY AUTOMATED COUNT: 44 FL (ref 35–45)
GFR SERPL CREATININE-BSD FRML MDRD: 63 ML/MIN/1.73M2
GLUCOSE BLD-MCNC: 124 MG/DL (ref 70–108)
GLUCOSE URINE: NEGATIVE MG/DL
HCT VFR BLD CALC: 40.9 % (ref 37–47)
HEMOGLOBIN: 12.7 GM/DL (ref 12–16)
ICTOTEST: NEGATIVE
IMMATURE GRANS (ABS): 0.3 THOU/MM3 (ref 0–0.07)
IMMATURE GRANULOCYTES: 1.8 %
KETONES, URINE: ABNORMAL
KOH PREP: NORMAL
LACTIC ACID, SEPSIS: 1.1 MMOL/L (ref 0.5–1.9)
LACTIC ACID, SEPSIS: 1.6 MMOL/L (ref 0.5–1.9)
LEUKOCYTE ESTERASE, URINE: ABNORMAL
LIPASE: 4.9 U/L (ref 5.6–51.3)
LYMPHOCYTES # BLD: 5.2 %
LYMPHOCYTES ABSOLUTE: 0.9 THOU/MM3 (ref 1–4.8)
MCH RBC QN AUTO: 29.8 PG (ref 26–33)
MCHC RBC AUTO-ENTMCNC: 31.1 GM/DL (ref 32.2–35.5)
MCV RBC AUTO: 96 FL (ref 81–99)
MISCELLANEOUS 2: ABNORMAL
MONOCYTES # BLD: 6.5 %
MONOCYTES ABSOLUTE: 1.1 THOU/MM3 (ref 0.4–1.3)
MRSA SCREEN RT-PCR: NEGATIVE
MUCUS: ABNORMAL
NEISSERIA GONORRHOEAE BY RT-PCR: NOT DETECTED
NITRITE, URINE: POSITIVE
NUCLEATED RED BLOOD CELLS: 0 /100 WBC
OSMOLALITY CALCULATION: 271.9 MOSMOL/KG (ref 275–300)
PH UA: 5 (ref 5–9)
PLATELET # BLD: 327 THOU/MM3 (ref 130–400)
PMV BLD AUTO: 10.3 FL (ref 9.4–12.4)
POTASSIUM REFLEX MAGNESIUM: 3.7 MEQ/L (ref 3.5–5.2)
PROCALCITONIN: 1.7 NG/ML (ref 0.01–0.09)
PROTEIN UA: 100
RBC # BLD: 4.26 MILL/MM3 (ref 4.2–5.4)
RBC URINE: ABNORMAL /HPF
RENAL EPITHELIAL, UA: ABNORMAL
SARS-COV-2, NAAT: NOT DETECTED
SEG NEUTROPHILS: 86 %
SEGMENTED NEUTROPHILS ABSOLUTE COUNT: 14.2 THOU/MM3 (ref 1.8–7.7)
SODIUM BLD-SCNC: 134 MEQ/L (ref 135–145)
SPECIFIC GRAVITY, URINE: > 1.03 (ref 1–1.03)
TOTAL PROTEIN: 6.7 G/DL (ref 6.1–8)
TRICHOMONAS PREP: NORMAL
UROBILINOGEN, URINE: 1 EU/DL (ref 0–1)
VANCOMYCIN RESISTANT ENTEROCOCCUS: NEGATIVE
WBC # BLD: 16.5 THOU/MM3 (ref 4.8–10.8)
WBC UA: ABNORMAL /HPF
YEAST: ABNORMAL

## 2020-08-07 PROCEDURE — 74177 CT ABD & PELVIS W/CONTRAST: CPT

## 2020-08-07 PROCEDURE — 71045 X-RAY EXAM CHEST 1 VIEW: CPT

## 2020-08-07 PROCEDURE — 87220 TISSUE EXAM FOR FUNGI: CPT

## 2020-08-07 PROCEDURE — 81001 URINALYSIS AUTO W/SCOPE: CPT

## 2020-08-07 PROCEDURE — 87641 MR-STAPH DNA AMP PROBE: CPT

## 2020-08-07 PROCEDURE — 6370000000 HC RX 637 (ALT 250 FOR IP)

## 2020-08-07 PROCEDURE — 87040 BLOOD CULTURE FOR BACTERIA: CPT

## 2020-08-07 PROCEDURE — 87070 CULTURE OTHR SPECIMN AEROBIC: CPT

## 2020-08-07 PROCEDURE — 93975 VASCULAR STUDY: CPT

## 2020-08-07 PROCEDURE — 0UT10ZZ RESECTION OF LEFT OVARY, OPEN APPROACH: ICD-10-PCS | Performed by: OBSTETRICS & GYNECOLOGY

## 2020-08-07 PROCEDURE — 2580000003 HC RX 258: Performed by: STUDENT IN AN ORGANIZED HEALTH CARE EDUCATION/TRAINING PROGRAM

## 2020-08-07 PROCEDURE — 96375 TX/PRO/DX INJ NEW DRUG ADDON: CPT

## 2020-08-07 PROCEDURE — 83605 ASSAY OF LACTIC ACID: CPT

## 2020-08-07 PROCEDURE — 36415 COLL VENOUS BLD VENIPUNCTURE: CPT

## 2020-08-07 PROCEDURE — 99285 EMERGENCY DEPT VISIT HI MDM: CPT

## 2020-08-07 PROCEDURE — 85025 COMPLETE CBC W/AUTO DIFF WBC: CPT

## 2020-08-07 PROCEDURE — 87500 VANOMYCIN DNA AMP PROBE: CPT

## 2020-08-07 PROCEDURE — 6370000000 HC RX 637 (ALT 250 FOR IP): Performed by: STUDENT IN AN ORGANIZED HEALTH CARE EDUCATION/TRAINING PROGRAM

## 2020-08-07 PROCEDURE — 6360000002 HC RX W HCPCS: Performed by: EMERGENCY MEDICINE

## 2020-08-07 PROCEDURE — 2060000000 HC ICU INTERMEDIATE R&B

## 2020-08-07 PROCEDURE — 87210 SMEAR WET MOUNT SALINE/INK: CPT

## 2020-08-07 PROCEDURE — 6360000004 HC RX CONTRAST MEDICATION: Performed by: EMERGENCY MEDICINE

## 2020-08-07 PROCEDURE — 84145 PROCALCITONIN (PCT): CPT

## 2020-08-07 PROCEDURE — 93005 ELECTROCARDIOGRAM TRACING: CPT | Performed by: STUDENT IN AN ORGANIZED HEALTH CARE EDUCATION/TRAINING PROGRAM

## 2020-08-07 PROCEDURE — 87086 URINE CULTURE/COLONY COUNT: CPT

## 2020-08-07 PROCEDURE — 2500000003 HC RX 250 WO HCPCS: Performed by: STUDENT IN AN ORGANIZED HEALTH CARE EDUCATION/TRAINING PROGRAM

## 2020-08-07 PROCEDURE — 87081 CULTURE SCREEN ONLY: CPT

## 2020-08-07 PROCEDURE — U0002 COVID-19 LAB TEST NON-CDC: HCPCS

## 2020-08-07 PROCEDURE — 80076 HEPATIC FUNCTION PANEL: CPT

## 2020-08-07 PROCEDURE — 0UT60ZZ RESECTION OF LEFT FALLOPIAN TUBE, OPEN APPROACH: ICD-10-PCS | Performed by: OBSTETRICS & GYNECOLOGY

## 2020-08-07 PROCEDURE — 96365 THER/PROPH/DIAG IV INF INIT: CPT

## 2020-08-07 PROCEDURE — 87491 CHLMYD TRACH DNA AMP PROBE: CPT

## 2020-08-07 PROCEDURE — 88305 TISSUE EXAM BY PATHOLOGIST: CPT

## 2020-08-07 PROCEDURE — 80048 BASIC METABOLIC PNL TOTAL CA: CPT

## 2020-08-07 PROCEDURE — 87205 SMEAR GRAM STAIN: CPT

## 2020-08-07 PROCEDURE — 87591 N.GONORRHOEAE DNA AMP PROB: CPT

## 2020-08-07 PROCEDURE — 6370000000 HC RX 637 (ALT 250 FOR IP): Performed by: INTERNAL MEDICINE

## 2020-08-07 PROCEDURE — 2580000003 HC RX 258: Performed by: EMERGENCY MEDICINE

## 2020-08-07 PROCEDURE — 76830 TRANSVAGINAL US NON-OB: CPT

## 2020-08-07 PROCEDURE — 83690 ASSAY OF LIPASE: CPT

## 2020-08-07 PROCEDURE — 96361 HYDRATE IV INFUSION ADD-ON: CPT

## 2020-08-07 RX ORDER — SODIUM CHLORIDE 0.9 % (FLUSH) 0.9 %
10 SYRINGE (ML) INJECTION EVERY 12 HOURS SCHEDULED
Status: DISCONTINUED | OUTPATIENT
Start: 2020-08-07 | End: 2020-08-18 | Stop reason: HOSPADM

## 2020-08-07 RX ORDER — IBUPROFEN 200 MG
400 TABLET ORAL ONCE
Status: COMPLETED | OUTPATIENT
Start: 2020-08-07 | End: 2020-08-07

## 2020-08-07 RX ORDER — ACETAMINOPHEN 500 MG
1000 TABLET ORAL ONCE
Status: COMPLETED | OUTPATIENT
Start: 2020-08-07 | End: 2020-08-07

## 2020-08-07 RX ORDER — FAMOTIDINE 20 MG/1
20 TABLET, FILM COATED ORAL 2 TIMES DAILY
COMMUNITY
End: 2020-09-17

## 2020-08-07 RX ORDER — IBUPROFEN 200 MG
TABLET ORAL
Status: COMPLETED
Start: 2020-08-07 | End: 2020-08-07

## 2020-08-07 RX ORDER — ACETAMINOPHEN 650 MG/1
650 SUPPOSITORY RECTAL EVERY 6 HOURS PRN
Status: DISCONTINUED | OUTPATIENT
Start: 2020-08-07 | End: 2020-08-10

## 2020-08-07 RX ORDER — 0.9 % SODIUM CHLORIDE 0.9 %
1000 INTRAVENOUS SOLUTION INTRAVENOUS ONCE
Status: COMPLETED | OUTPATIENT
Start: 2020-08-07 | End: 2020-08-07

## 2020-08-07 RX ORDER — PROMETHAZINE HYDROCHLORIDE 25 MG/1
12.5 TABLET ORAL EVERY 6 HOURS PRN
Status: DISCONTINUED | OUTPATIENT
Start: 2020-08-07 | End: 2020-08-18 | Stop reason: HOSPADM

## 2020-08-07 RX ORDER — SODIUM CHLORIDE 9 MG/ML
INJECTION, SOLUTION INTRAVENOUS CONTINUOUS
Status: DISCONTINUED | OUTPATIENT
Start: 2020-08-07 | End: 2020-08-08

## 2020-08-07 RX ORDER — SODIUM CHLORIDE 0.9 % (FLUSH) 0.9 %
10 SYRINGE (ML) INJECTION PRN
Status: DISCONTINUED | OUTPATIENT
Start: 2020-08-07 | End: 2020-08-18 | Stop reason: HOSPADM

## 2020-08-07 RX ORDER — ACETAMINOPHEN 325 MG/1
650 TABLET ORAL EVERY 6 HOURS PRN
Status: DISCONTINUED | OUTPATIENT
Start: 2020-08-07 | End: 2020-08-10

## 2020-08-07 RX ORDER — FAMOTIDINE 20 MG/1
20 TABLET, FILM COATED ORAL 2 TIMES DAILY
Status: CANCELLED | OUTPATIENT
Start: 2020-08-07

## 2020-08-07 RX ORDER — POLYETHYLENE GLYCOL 3350 17 G/17G
17 POWDER, FOR SOLUTION ORAL DAILY PRN
Status: DISCONTINUED | OUTPATIENT
Start: 2020-08-07 | End: 2020-08-08

## 2020-08-07 RX ORDER — ONDANSETRON 2 MG/ML
4 INJECTION INTRAMUSCULAR; INTRAVENOUS ONCE
Status: COMPLETED | OUTPATIENT
Start: 2020-08-07 | End: 2020-08-07

## 2020-08-07 RX ORDER — OXYCODONE AND ACETAMINOPHEN 7.5; 325 MG/1; MG/1
1 TABLET ORAL 2 TIMES DAILY PRN
Status: DISCONTINUED | OUTPATIENT
Start: 2020-08-07 | End: 2020-08-08

## 2020-08-07 RX ORDER — MORPHINE SULFATE 10 MG/ML
6 INJECTION, SOLUTION INTRAMUSCULAR; INTRAVENOUS ONCE
Status: COMPLETED | OUTPATIENT
Start: 2020-08-07 | End: 2020-08-07

## 2020-08-07 RX ORDER — ONDANSETRON 2 MG/ML
4 INJECTION INTRAMUSCULAR; INTRAVENOUS EVERY 6 HOURS PRN
Status: DISCONTINUED | OUTPATIENT
Start: 2020-08-07 | End: 2020-08-18 | Stop reason: HOSPADM

## 2020-08-07 RX ADMIN — SODIUM CHLORIDE: 9 INJECTION, SOLUTION INTRAVENOUS at 16:00

## 2020-08-07 RX ADMIN — SODIUM CHLORIDE 1000 ML: 9 INJECTION, SOLUTION INTRAVENOUS at 11:05

## 2020-08-07 RX ADMIN — ONDANSETRON 4 MG: 2 INJECTION INTRAMUSCULAR; INTRAVENOUS at 11:24

## 2020-08-07 RX ADMIN — IBUPROFEN 400 MG: 200 TABLET, FILM COATED ORAL at 18:24

## 2020-08-07 RX ADMIN — OXYCODONE HYDROCHLORIDE AND ACETAMINOPHEN 1 TABLET: 7.5; 325 TABLET ORAL at 21:29

## 2020-08-07 RX ADMIN — METRONIDAZOLE 500 MG: 500 INJECTION, SOLUTION INTRAVENOUS at 15:59

## 2020-08-07 RX ADMIN — CEFEPIME HYDROCHLORIDE 2 G: 2 INJECTION, POWDER, FOR SOLUTION INTRAVENOUS at 13:46

## 2020-08-07 RX ADMIN — MORPHINE SULFATE 6 MG: 10 INJECTION INTRAVENOUS at 11:23

## 2020-08-07 RX ADMIN — Medication 400 MG: at 18:24

## 2020-08-07 RX ADMIN — IOPAMIDOL 80 ML: 755 INJECTION, SOLUTION INTRAVENOUS at 13:12

## 2020-08-07 RX ADMIN — ACETAMINOPHEN 1000 MG: 500 TABLET ORAL at 19:09

## 2020-08-07 RX ADMIN — SODIUM CHLORIDE 1000 ML: 9 INJECTION, SOLUTION INTRAVENOUS at 13:48

## 2020-08-07 ASSESSMENT — PAIN DESCRIPTION - INTENSITY
RATING_2: 9

## 2020-08-07 ASSESSMENT — ENCOUNTER SYMPTOMS
COUGH: 0
ABDOMINAL PAIN: 1
SHORTNESS OF BREATH: 0
NAUSEA: 1
EYE PAIN: 0
CHEST TIGHTNESS: 0
VOMITING: 0
BACK PAIN: 1
SINUS PAIN: 0
RHINORRHEA: 0
COLOR CHANGE: 0
SINUS PRESSURE: 0
EYE DISCHARGE: 0
EYE ITCHING: 0

## 2020-08-07 ASSESSMENT — PAIN SCALES - GENERAL
PAINLEVEL_OUTOF10: 9
PAINLEVEL_OUTOF10: 8
PAINLEVEL_OUTOF10: 9
PAINLEVEL_OUTOF10: 9
PAINLEVEL_OUTOF10: 8
PAINLEVEL_OUTOF10: 8

## 2020-08-07 ASSESSMENT — PAIN DESCRIPTION - DURATION: DURATION_2: CONTINUOUS

## 2020-08-07 ASSESSMENT — PAIN DESCRIPTION - LOCATION
LOCATION: BACK
LOCATION: ABDOMEN
LOCATION: ABDOMEN
LOCATION_2: BACK
LOCATION: BACK
LOCATION_2: BACK
LOCATION_2: ABDOMEN
LOCATION_2: BACK
LOCATION: ABDOMEN
LOCATION: ABDOMEN

## 2020-08-07 ASSESSMENT — PAIN DESCRIPTION - FREQUENCY
FREQUENCY: CONTINUOUS
FREQUENCY: CONTINUOUS

## 2020-08-07 ASSESSMENT — PAIN DESCRIPTION - ORIENTATION
ORIENTATION: LOWER
ORIENTATION: LOWER;RIGHT;LEFT
ORIENTATION_2: LOWER
ORIENTATION: UPPER
ORIENTATION: LOWER
ORIENTATION_2: LEFT;RIGHT;LOWER
ORIENTATION_2: LOWER

## 2020-08-07 ASSESSMENT — PAIN DESCRIPTION - ONSET
ONSET: ON-GOING
ONSET_2: ON-GOING
ONSET: ON-GOING

## 2020-08-07 ASSESSMENT — PAIN DESCRIPTION - PROGRESSION
CLINICAL_PROGRESSION: NOT CHANGED
CLINICAL_PROGRESSION: NOT CHANGED
CLINICAL_PROGRESSION_2: NOT CHANGED
CLINICAL_PROGRESSION: NOT CHANGED

## 2020-08-07 ASSESSMENT — PAIN DESCRIPTION - DESCRIPTORS
DESCRIPTORS: ACHING
DESCRIPTORS_2: PRESSURE
DESCRIPTORS: ACHING

## 2020-08-07 ASSESSMENT — PAIN DESCRIPTION - PAIN TYPE
TYPE: ACUTE PAIN
TYPE_2: ACUTE PAIN
TYPE: ACUTE PAIN

## 2020-08-07 ASSESSMENT — PAIN - FUNCTIONAL ASSESSMENT
PAIN_FUNCTIONAL_ASSESSMENT: ACTIVITIES ARE NOT PREVENTED
PAIN_FUNCTIONAL_ASSESSMENT: ACTIVITIES ARE NOT PREVENTED

## 2020-08-07 NOTE — FLOWSHEET NOTE
08/07/20 1845   Provider Notification   Reason for Communication Critical Value (comment)  (temp 103.1 and -140's)   Provider Name Dr More Push   Provider Notification Physician   Method of Communication Secure Message   Response See orders  (1000 mg of Tylenol once )   Notification Time 1845   -140's oral temp 103.1 /58. Give 1000 mg Tylenol once.

## 2020-08-07 NOTE — ED NOTES
Dr. Josemanuel Butler at bedside for pelvic exam.  Patient complaint of lower back pain.      Maricarmen Del Castillo RN  08/07/20 8050

## 2020-08-07 NOTE — ED NOTES
Patient resting on cart with complaint of left sided abdominal pain and lower back pain. Patient updated on plan of care and pending admission. Patient medicated per order. Patient up to bedside commode. Respirations unlabored. Small amount red blood noted on pad and tissue. Call light in reach.      Dwain Pina RN  08/07/20 1010

## 2020-08-07 NOTE — ED PROVIDER NOTES
[facility] ENCOUNTER          Pt Name: Joy Jackson  MRN: [id number]  [place] 1960  Date of evaluation: 2020  Treating Resident Physician: Todd Gayle DO  Supervising Physician: Dayton Dean, [address] [place] [address]       Chief Complaint   Patient presents with    Abdominal Pain    Fever    Vaginal Bleeding     History obtained from the patient. HISTORY OF PRESENT ILLNESS    63 y/o F  post menopausal female with past medical history of chronic gastritis and emphysema presenting to the ED for evaluation of fever, abdominal pain, and vaginal bleeding. She does not have established care with an OB-Gyn. She states that last Tuesday she noticed vaginal bleeding with passage of clots, stating that since onset she has used 3-4 pads/day. She also endorses a fever of 102.9F starting 2 days ago, with associated lower, \"squeezing,\" abdominal pain. She states she took ibuprofen 2 hours prior to arrival.    She is a former 1/2 ppd smoker and quit approximately 1 year ago. REVIEW OF SYSTEMS   Review of Systems   Constitutional: Positive for fever. HENT: Negative for rhinorrhea, sinus pressure and sinus pain. Eyes: Negative for pain, discharge and itching. Respiratory: Negative for shortness of breath. Cardiovascular: Negative for chest pain. Gastrointestinal: Positive for nausea. Negative for vomiting. Genitourinary: Positive for vaginal bleeding. Negative for difficulty urinating, flank pain, frequency, hematuria, urgency and vaginal discharge. Musculoskeletal: Positive for back pain. She has chronic musculoskeletal back pain   Skin: Negative for color change and pallor. Neurological: Positive for dizziness, light-headedness and headaches. Negative for weakness.          PAST MEDICAL AND SURGICAL HISTORY     Past Medical History:   Diagnosis Date    Allergic rhinitis     Chronic back pain     Headache(784.0)  Panlobular emphysema ([facility] [address] 3/15/2018     Past Surgical History:   Procedure Laterality Date    BACK SURGERY      COLONOSCOPY Left 2019    COLONOSCOPY POLYPECTOMY SNARE/COLD BIOPSY performed by Ramon Castaneda MD at [address], [address] [place] [address]     Current Facility-Administered Medications:     sodium chloride flush 0.9 % injection 10 mL, 10 mL, Intravenous, 2 times per day, Celestina Vega DO    sodium chloride flush 0.9 % injection 10 mL, 10 mL, Intravenous, PRN, Celestina Vega DO    acetaminophen (TYLENOL) tablet 650 mg, 650 mg, Oral, Q6H PRN **OR** acetaminophen (TYLENOL) suppository 650 mg, 650 mg, Rectal, Q6H PRN, Celestina Vega DO    polyethylene glycol (GLYCOLAX) packet 17 g, 17 g, Oral, Daily PRN, Celestina Vega DO    promethazine (PHENERGAN) tablet 12.5 mg, 12.5 mg, Oral, Q6H PRN **OR** ondansetron (ZOFRAN) injection 4 mg, 4 mg, Intravenous, Q6H PRN, Celestina Vega DO    enoxaparin (LOVENOX) injection 40 mg, 40 mg, Subcutaneous, Daily, Celestina Vega DO, Stopped at 20 1942    [START ON 2020] cefepime (MAXIPIME) 2 g IVPB minibag, 2 g, Intravenous, Q12H, Celestina Vega DO    [START ON 2020] metronidazole (FLAGYL) 500 mg in NaCl 100 mL IVPB premix, 500 mg, Intravenous, Q8H, Edwin Neumann DO    oxyCODONE-acetaminophen (PERCOCET) 7.5-325 MG per tablet 1 tablet, 1 tablet, Oral, BID PRN, Celestina Vega DO    0.9 % sodium chloride infusion, , Intravenous, Continuous, Celestina Vega DO, Last Rate: 150 mL/hr at 20 1600      SOCIAL HISTORY     Social History     Social History Narrative    Not on file     Social History     Tobacco Use    Smoking status: Former Smoker     Packs/day: 0.50     Years: 40.00     Pack years: 20.00     Types: Cigarettes     Start date: 1978     Last attempt to quit: 2018     Years since quittin.3    Smokeless tobacco: Never Used   Substance Use Topics  Alcohol use: No     Alcohol/week: 0.0 standard drinks    Drug use: No         ALLERGIES     Allergies   Allergen Reactions    Latex Shortness Of Breath     And hives    Flu Virus Vaccine     Fluzone [Influenza Vac Split Quad] Other (See Comments)     Letter in media     Sulfa Antibiotics Other (See Comments)     Eye ulcers from eye drops    Lead (Elemental) Rash         FAMILY HISTORY     Family History   Problem Relation Age of Onset    High Blood Pressure Mother     Asthma Mother     High Blood Pressure Father     Diabetes Maternal Grandmother          PREVIOUS RECORDS   Previous records reviewed      PHYSICAL EXAM     ED Triage Vitals   BP Temp Temp Source Pulse Resp SpO2 Height Weight   08/07/20 1023 08/07/20 1023 08/07/20 1023 08/07/20 1023 08/07/20 1023 08/07/20 1023 08/07/20 1020 08/07/20 1020   120/74 99.6 °F (37.6 °C) Oral 132 18 97 % 5' 8\" (1.727 m) 190 lb (86.2 kg)     Initial vital signs and nursing assessment reviewed and abnormal from Tachycardia. Pulsoximetry is normal per my interpretation. Additional Vital Signs:  Vitals:    08/07/20 2015   BP: (!) 110/56   Pulse: 131   Resp: 18   Temp: 99.9 °F (37.7 °C)   SpO2: 91%       Physical Exam  Constitutional:       General: She is not in acute distress. Appearance: Normal appearance. She is obese. She is not ill-appearing, toxic-appearing or diaphoretic. HENT:      Head: Normocephalic and atraumatic. Nose: Nose normal. No congestion or rhinorrhea. Mouth/Throat:      Mouth: Mucous membranes are dry. Pharynx: No oropharyngeal exudate or posterior oropharyngeal erythema. Eyes:      Extraocular Movements: Extraocular movements intact. Pupils: Pupils are equal, round, and reactive to light. Neck:      Musculoskeletal: Normal range of motion and neck supple. Cardiovascular:      Rate and Rhythm: Regular rhythm. Tachycardia present. Pulses: Normal pulses. Heart sounds: Normal heart sounds.    Pulmonary: Effort: Pulmonary effort is normal.      Breath sounds: Normal breath sounds. Abdominal:      General: Abdomen is flat. There is distension. Tenderness: There is abdominal tenderness. Comments: Bilateral lower abdominal tenderness appreciated   Genitourinary:     General: Normal vulva. Vagina: No vaginal discharge. Comments: Polyploidal mass extending out of cervix with friable tissue and bleeding observed. Pelvic exam performed with OLIVIER Cook as chaperone. Musculoskeletal: Normal range of motion. General: No swelling or tenderness. Right lower leg: No edema. Left lower leg: No edema. Skin:     General: Skin is warm and dry. Capillary Refill: Capillary refill takes less than 2 seconds. Neurological:      General: No focal deficit present. Mental Status: She is alert and oriented to person, place, and time. Mental status is at baseline. MEDICAL DECISION MAKING   Initial Assessment: 61 y/o female  post menopausal female presenting for vaginal bleeding, abdominal pain, fever. Friable mass extending out of cervix noted on pelvic exam. She is tachycardic in the 130s with concern for sepsis. Plan: Labs, sepsis workup, CT a/p IV contrast, transvaginal ultrasound.         ED RESULTS   Laboratory results:  Labs Reviewed   CBC WITH AUTO DIFFERENTIAL - Abnormal; Notable for the following components:       Result Value    WBC 16.5 (*)     MCHC 31.1 (*)     Segs Absolute 14.2 (*)     Lymphocytes Absolute 0.9 (*)     Immature Grans (Abs) 0.30 (*)     All other components within normal limits   BASIC METABOLIC PANEL W/ REFLEX TO MG FOR LOW K - Abnormal; Notable for the following components:    Sodium 134 (*)     Glucose 124 (*)     All other components within normal limits   LIPASE - Abnormal; Notable for the following components:    Lipase 4.9 (*)     All other components within normal limits   HEPATIC FUNCTION PANEL - Abnormal; Notable for the following components:    Alb 2.9 (*)     All other components within normal limits   PROCALCITONIN - Abnormal; Notable for the following components:    Procalcitonin 1.70 (*)     All other components within normal limits   OSMOLALITY - Abnormal; Notable for the following components:    Osmolality Calc 271.9 (*)     All other components within normal limits   GLOMERULAR FILTRATION RATE, ESTIMATED - Abnormal; Notable for the following components:    Est, Glom Filt Rate 63 (*)     All other components within normal limits   URINE WITH REFLEXED MICRO - Abnormal; Notable for the following components:    Bilirubin Urine MODERATE (*)     Ketones, Urine TRACE (*)     Specific Gravity, Urine > 1.030 (*)     Blood, Urine TRACE (*)     Protein,  (*)     Nitrite, Urine POSITIVE (*)     Leukocyte Esterase, Urine TRACE (*)     Color, UA DARK YELLOW (*)     Character, Urine CLOUDY (*)     All other components within normal limits   KOH (SKIN,HAIR,NAILS)    Narrative:     Source: vaginal non-OB patient       Site:           Current Antibiotics:   WET PREP, GENITAL    Narrative:     Source: vaginal non-OB patient       Site: swab          Current Antibiotics: not stated   C. TRACHOMATIS / N. GONORRHOEAE, DNA   CULTURE, BLOOD 1   CULTURE, BLOOD 2   CULTURE, GENITAL    Narrative:     Source: vaginal non-OB patient       Site: swab          Current Antibiotics: not stated   CULTURE, REFLEXED, URINE   VRE SCREEN BY PCR   CULTURE, MRSA, SCREENING   LACTATE, SEPSIS   LACTATE, SEPSIS   ANION GAP   BILE ACIDS, TOTAL   COVID-19      SURGICAL PATHOLOGY   BASIC METABOLIC PANEL W/ REFLEX TO MG FOR LOW K   CBC WITH AUTO DIFFERENTIAL   PROCALCITONIN   MRSA BY PCR       Radiologic studies results:  XR CHEST 1 VIEW   Final Result   No confluent infiltrate. PA and lateral radiographs could be performed if indicated clinically. **This report has been created using voice recognition software.  It may contain minor errors which are inherent in voice recognition technology. **      Final report electronically signed by Dr. Andrei Cross on 8/7/2020 1:30 PM      US NON OB TRANSVAGINAL   Final Result      Large complex lesion in the left ovary could relate to abscess or hemorrhagic neoplasm either benign or malignant. Lack of significant ascites mitigates against malignancy. **This report has been created using voice recognition software. It may contain minor errors which are inherent in voice recognition technology. **      Final report electronically signed by Dr. Andrei Cross on 8/7/2020 1:44 PM      US DUP ABD PEL RETRO SCROT COMPLETE   Final Result      Large complex lesion in the left ovary could relate to abscess or hemorrhagic neoplasm either benign or malignant. Lack of significant ascites mitigates against malignancy. **This report has been created using voice recognition software. It may contain minor errors which are inherent in voice recognition technology. **      Final report electronically signed by Dr. Andrei Cross on 8/7/2020 1:44 PM      CT ABDOMEN PELVIS W IV CONTRAST Additional Contrast? None   Final Result   Lingular pneumonia. Cholelithiasis. Large pelvic mass which is mildly inflamed likely relating to the left ovary. Benign and malignant etiologies are possible. **This report has been created using voice recognition software. It may contain minor errors which are inherent in voice recognition technology. **      Final report electronically signed by Dr. Andrei Cross on 8/7/2020 1:23 PM          ED Medications administered this visit:   Medications   sodium chloride flush 0.9 % injection 10 mL (has no administration in time range)   sodium chloride flush 0.9 % injection 10 mL (has no administration in time range)   acetaminophen (TYLENOL) tablet 650 mg (has no administration in time range)     Or   acetaminophen (TYLENOL) suppository 650 mg (has no Protein, (!)   Urobilinogen, Urine 1.0   Nitrite, Urine POSITIVE(!)   Leukocyte Esterase, Urine TRACE(!)   Color, UA DARK YELLOW(!)   Character, Urine CLOUDY(!) [DO]   [id number] Dr. Diamond High evaluated the patient at this time and performed a pelvic exam, with partial removal of suspected endocervical polyp. Specimen was obtained and sent for pathology. [DO]      ED Course User Index  [DO] Lane Peters,      Strict return precautions and follow up instructions were discussed with the patient prior to discharge, with which the patient agrees. MEDICATION CHANGES     Current Discharge Medication List          FINAL DISPOSITION   This is a 63 y/o female with past medical history of chronic gastritis and emphysema presenting to the emergency department for abdominal pain, fever, and post menopausal vaginal bleeding. She was noted to be tachycardic to the 130s and febrile to 103F in the ED. Additionally she was noted to have a WBC of 16.5. Workup in the ED, including CT a/p and transvaginal ultrasound, was significant for a left ovarian mass that was inflamed and could represent a possible infectious source, abscess, or malignancy. Additionally a suspected endocervical polyp was noted on pelvic exam, of which a portion was removed and sent for pathology study. Sepsis labs were initiated and the patient was started on IV NS and given doses of cefepime and flagyl. Discussed test results and treatment plan with patient at bedside. Patient verbalized understanding of treatment plan and all questions were answered at bedside. I spoke with the admitting hospitalist, Toshia Hill, who agreed to accept this patient for further evaluation and treatment as an inpatient. Additionally, I spoke with Dr. Diamond High who agreed to consult for this case. At this time, the patient is stable for discharge from the emergency department.        Final diagnoses:   Septicemia ([facility] [address]     Condition: condition: stable  Dispo: Admit to med/surg floor      This transcription was electronically signed. Parts of this transcriptions may have been dictated by use of voice recognition software and electronically transcribed, and parts may have been transcribed with the assistance of an ED scribe. The transcription may contain errors not detected in proofreading. Please refer to my supervising physician's documentation if my documentation differs.     Electronically Signed: Patrcie Aquino, 08/07/20, 8:35 PM          Patrice Aquino DO  Resident  08/07/20 2684

## 2020-08-07 NOTE — ED NOTES
ED to inpatient nurses report    Chief Complaint   Patient presents with    Abdominal Pain    Fever    Vaginal Bleeding      Present to ED from home  LOC: alert and orientated to name, place, date  Vital signs   Vitals:    08/07/20 1147 08/07/20 1350 08/07/20 1417 08/07/20 1532   BP: 98/61 113/67 108/69 124/68   Pulse: 125 130 128 133   Resp: 14 16 22 24   Temp:       TempSrc:       SpO2: 92% 94% 94% 92%   Weight:       Height:          Oxygen Baseline 97%    Current needs required Room Air Bipap/Cpap No  LDAs:   Peripheral IV 08/07/20 Left Forearm (Active)   Site Assessment Clean;Dry; Intact 08/07/20 1606   Line Status Infusing 08/07/20 1351     Mobility: Independent  Pending ED orders: None  Present condition: Stable    Electronically signed by Baltazar Mcgee RN on 8/7/2020 at 4:10 PM       Morro Gonzales RN  08/07/20 3341

## 2020-08-07 NOTE — H&P
[facility]  History & Physical        Pt Name: Mireya Jim  MRN: [id number]  [place] 1960  Date of evaluation: 8/7/2020  Treating Resident Physician: Vinh Fernando DO  Supervising Physician: Moises Wood MD    [address] [place] [address]       Chief Complaint   Patient presents with    Abdominal Pain    Fever    Vaginal Bleeding     History obtained from the patient. HISTORY OF PRESENT ILLNESS    HPI  Mireya Jim is a 62 y.o. postmenopausal female with past medical history of COPD and chronic back pain who is being admitted for sepsis and pelvic mass. Ms. Sophia Bowers noticed a mild amount of painless vaginal bleeding that began on Tuesday of last week 7/28/20. This continued daily and worsened until the patient passed tissue and clots this Sunday while using the restroom. The patient noted \"something was protruding from my vagina, so I pushed it back in\". The bleeding maintained and on Wednesday 8/5/2020 the patient began having fevers at home. She admitted to a Tmax of 102.2. Michael Westfall took two ibuprofen before presenting to the Emergency Department today. She states that during this time of bleeding she has had associated abdominal pain, nausea, decreased PO intake and burning with urination. She has no seen an OB/GYN in approximately a decade. The patient has no other acute complaints at this time. REVIEW OF SYSTEMS   Review of Systems   Constitutional: Positive for fever. HENT: Negative for ear discharge, ear pain, postnasal drip, rhinorrhea, sinus pressure and sinus pain. Eyes: Negative for pain and discharge. Respiratory: Negative for cough, chest tightness and shortness of breath. Gastrointestinal: Positive for abdominal pain and nausea. Negative for vomiting. Genitourinary: Positive for dysuria, pelvic pain, vaginal bleeding and vaginal discharge (clots and tissue). Musculoskeletal: Negative for neck pain and neck stiffness. Skin: Negative for color change and rash. Neurological: Negative for dizziness, syncope, weakness, light-headedness, numbness and headaches. PAST MEDICAL AND SURGICAL HISTORY     Past Medical History:   Diagnosis Date    Allergic rhinitis     Chronic back pain     Headache(784.0)     Panlobular emphysema ([address] 3/15/2018     Past Surgical History:   Procedure Laterality Date    BACK SURGERY      COLONOSCOPY Left 8/2/2019    COLONOSCOPY POLYPECTOMY SNARE/COLD BIOPSY [facility]ag[facility] [address] [address] [facility]     Current Facility-Administered Medications:     metronidazole (FLAGYL) 500 mg in NaCl 100 mL IVPB premix, 500 mg, Intravenous, Once, Lane Ebbing, DO, Last Rate: 100 mL/hr at 08/07/20 1559, 500 mg at 08/07/20 1559    0.9 % sodium chloride infusion, , Intravenous, Continuous, Albertfreda Ebbs, DO, Last Rate: 150 mL/hr at 08/07/20 1600    Current Outpatient Medications:     famotidine (PEPCID) 20 MG tablet, Take 20 mg by mouth 2 times daily, Disp: , Rfl:     albuterol sulfate  (90 Base) MCG/ACT inhaler, INHALE 2 PUFFS BY MOUTH INTO THE LUNGS EVERY 6 HOURS AS NEEDED FOR WHEEZING, Disp: 18 g, Rfl: 3    nicotine polacrilex (EQL NICOTINE) 4 MG lozenge, Take 1 lozenge by mouth as needed for Smoking cessation, Disp: 100 each, Rfl: 0    Cyclobenzaprine HCl (FLEXERIL PO), Take  by mouth nightly., Disp: , Rfl:     oxyCODONE-acetaminophen (PERCOCET) 7.5-325 MG per tablet, Take 1 tablet by mouth 2 times daily as needed. , Disp: , Rfl:     ibuprofen (ADVIL;MOTRIN) 200 MG tablet, Take 400 mg by mouth every 8 hours as needed.   , Disp: , Rfl:     Pseudoephedrine-Guaifenesin (MUCINEX D PO), Take by mouth, Disp: , Rfl:       SOCIAL HISTORY     Social History     Social History Narrative    Not on file     Social History     Tobacco Use    Smoking status: Former Smoker     Packs/day: 0.50     Years: 40.00     Pack years: 20.00     Types: Cigarettes     Start date: 1978     Last attempt to quit: 2018     Years since quittin.3    Smokeless tobacco: Never Used   Substance Use Topics    Alcohol use: No     Alcohol/week: 0.0 standard drinks    Drug use: No         ALLERGIES     Allergies   Allergen Reactions    Latex Shortness Of Breath     And hives    Flu Virus Vaccine     Fluzone [Influenza Vac Split Quad] Other (See Comments)     Letter in media     Sulfa Antibiotics Other (See Comments)     Eye ulcers from eye drops    Lead (Elemental) Rash         FAMILY HISTORY     Family History   Problem Relation Age of Onset    High Blood Pressure Mother     Asthma Mother     High Blood Pressure Father     Diabetes Maternal Grandmother          PHYSICAL EXAM     ED Triage Vitals   BP Temp Temp Source Pulse Resp SpO2 Height Weight   20 1023 20 1023 20 1023 20 1023 20 1023 20 1023 20 1020 20 1020   120/74 99.6 °F (37.6 °C) Oral 132 18 97 % 5' 8\" (1.727 m) 190 lb (86.2 kg)         Additional Vital Signs:  Vitals:    20 1532   BP: 124/68   Pulse: 133   Resp: 24   Temp:    SpO2: 92%       Physical Exam  Constitutional:       General: She is not in acute distress. Appearance: She is not toxic-appearing. HENT:      Mouth/Throat:      Mouth: Mucous membranes are dry. Pharynx: Oropharynx is clear. No oropharyngeal exudate or posterior oropharyngeal erythema. Neck:      Musculoskeletal: Normal range of motion and neck supple. No neck rigidity or muscular tenderness. Cardiovascular:      Rate and Rhythm: Regular rhythm. Tachycardia present. Pulses: Normal pulses. Heart sounds: Normal heart sounds. No murmur. No friction rub. No gallop. Pulmonary:      Effort: Pulmonary effort is normal.      Breath sounds: Normal breath sounds. No wheezing, rhonchi or rales. Abdominal:      Palpations: Abdomen is soft. Tenderness:  There is abdominal tenderness (generalized and provoked by both light and deep palpation). There is no guarding or rebound. Musculoskeletal:      Right lower leg: No edema. Left lower leg: No edema. Skin:     General: Skin is warm and dry. Capillary Refill: Capillary refill takes less than 2 seconds. Neurological:      General: No focal deficit present. Mental Status: She is alert and oriented to person, place, and time.              ED RESULTS   Laboratory results:  Labs Reviewed   CBC WITH AUTO DIFFERENTIAL - Abnormal; Notable for the following components:       Result Value    WBC 16.5 (*)     MCHC 31.1 (*)     Segs Absolute 14.2 (*)     Lymphocytes Absolute 0.9 (*)     Immature Grans (Abs) 0.30 (*)     All other components within normal limits   BASIC METABOLIC PANEL W/ REFLEX TO MG FOR LOW K - Abnormal; Notable for the following components:    Sodium 134 (*)     Glucose 124 (*)     All other components within normal limits   LIPASE - Abnormal; Notable for the following components:    Lipase 4.9 (*)     All other components within normal limits   HEPATIC FUNCTION PANEL - Abnormal; Notable for the following components:    Alb 2.9 (*)     All other components within normal limits   PROCALCITONIN - Abnormal; Notable for the following components:    Procalcitonin 1.70 (*)     All other components within normal limits   OSMOLALITY - Abnormal; Notable for the following components:    Osmolality Calc 271.9 (*)     All other components within normal limits   GLOMERULAR FILTRATION RATE, ESTIMATED - Abnormal; Notable for the following components:    Est, Glom Filt Rate 63 (*)     All other components within normal limits   URINE WITH REFLEXED MICRO - Abnormal; Notable for the following components:    Bilirubin Urine MODERATE (*)     Ketones, Urine TRACE (*)     Specific Gravity, Urine > 1.030 (*)     Blood, Urine TRACE (*)     Protein,  (*)     Nitrite, Urine POSITIVE (*)     Leukocyte Esterase, Urine TRACE (*) Color, UA DARK YELLOW (*)     Character, Urine CLOUDY (*)     All other components within normal limits   KOH (SKIN,HAIR,NAILS)    Narrative:     Source: vaginal non-OB patient       Site:           Current Antibiotics:   WET PREP, GENITAL    Narrative:     Source: vaginal non-OB patient       Site: swab          Current Antibiotics: not stated   C. TRACHOMATIS / N. GONORRHOEAE, DNA   CULTURE, BLOOD 1   CULTURE, BLOOD 2   CULTURE, GENITAL    Narrative:     Source: vaginal non-OB patient       Site: swab          Current Antibiotics: not stated   CULTURE, REFLEXED, URINE   LACTATE, SEPSIS   LACTATE, SEPSIS   ANION GAP   BILE ACIDS, TOTAL   COVID-19          Radiologic studies results:  XR CHEST 1 VIEW   Final Result   No confluent infiltrate. PA and lateral radiographs could be performed if indicated clinically. **This report has been created using voice recognition software. It may contain minor errors which are inherent in voice recognition technology. **      Final report electronically signed by Dr. Manuel Morrell on 8/7/2020 1:30 PM      US NON OB TRANSVAGINAL   Final Result      Large complex lesion in the left ovary could relate to abscess or hemorrhagic neoplasm either benign or malignant. Lack of significant ascites mitigates against malignancy. **This report has been created using voice recognition software. It may contain minor errors which are inherent in voice recognition technology. **      Final report electronically signed by Dr. Manuel Morrell on 8/7/2020 1:44 PM      US DUP ABD PEL RETRO SCROT COMPLETE   Final Result      Large complex lesion in the left ovary could relate to abscess or hemorrhagic neoplasm either benign or malignant. Lack of significant ascites mitigates against malignancy. **This report has been created using voice recognition software. It may contain minor errors which are inherent in voice recognition technology. **      Final report electronically signed by Dr. Edwin Lam on 8/7/2020 1:44 PM      CT ABDOMEN PELVIS W IV CONTRAST Additional Contrast? None   Final Result   Lingular pneumonia. Cholelithiasis. Large pelvic mass which is mildly inflamed likely relating to the left ovary. Benign and malignant etiologies are possible. **This report has been created using voice recognition software. It may contain minor errors which are inherent in voice recognition technology. **      Final report electronically signed by Dr. Edwin Lam on 8/7/2020 1:23 PM          ED Medications administered this visit:   Medications   metronidazole (FLAGYL) 500 mg in NaCl 100 mL IVPB premix (500 mg Intravenous New Bag 8/7/20 1559)   0.9 % sodium chloride infusion ( Intravenous New Bag 8/7/20 1600)   0.9 % sodium chloride bolus (0 mLs Intravenous Stopped 8/7/20 1214)   morphine (PF) injection 6 mg (6 mg Intravenous Given 8/7/20 1123)   ondansetron (ZOFRAN) injection 4 mg (4 mg Intravenous Given 8/7/20 1124)   0.9 % sodium chloride bolus (0 mLs Intravenous Stopped 8/7/20 1558)   cefepime (MAXIPIME) 2 g IVPB minibag (0 g Intravenous Stopped 8/7/20 1428)   iopamidol (ISOVUE-370) 76 % injection 80 mL (80 mLs Intravenous Given 8/7/20 1312)         DISPOSITION     Plan:     1. Sepsis  -likely genitourinary source (ovarian abscess vs. Malignancy)  -less likely pneumonia with the lack of respiratory sxs  -3/4 sirs criteria with source: tachycardia, white count, and fever  -continue cefepime and flagyl with consult to ID    2. Left Ovarian Complex Mass  -white count, procal, and ultrasound suggest abscess  -consult Gynecology for managment  - pending    3. COPD  -pt does not have any sxs at this time  -monitor for hypoxia and need for nebulized treatment    4. Chronic back pain  -continue home pain medication regimen for pain control and prevention of withdrawal       This transcription was electronically signed.  Parts of this transcriptions may have been dictated by use of voice recognition software and electronically transcribed, and parts may have been transcribed with the assistance of an ED scribe. The transcription may contain errors not detected in proofreading. Please refer to my supervising physician's documentation if my documentation differs.     Electronically Signed: Francisco Deng, 08/07/20, 4:20 PM

## 2020-08-07 NOTE — ED NOTES
Dr. Charles Kirby and Dr. Dariel Rust at bedside for pelvic exam.  2 vaginal cultures and chlamydia culture obtained and sent to lab. Clear shanta cath urine collected and sent to lab. Patient tolerates well.      Anshu Boucher RN  08/07/20 6115

## 2020-08-08 ENCOUNTER — ANESTHESIA EVENT (OUTPATIENT)
Dept: OPERATING ROOM | Age: 60
DRG: 853 | End: 2020-08-08
Payer: COMMERCIAL

## 2020-08-08 ENCOUNTER — ANESTHESIA (OUTPATIENT)
Dept: OPERATING ROOM | Age: 60
DRG: 853 | End: 2020-08-08
Payer: COMMERCIAL

## 2020-08-08 ENCOUNTER — APPOINTMENT (OUTPATIENT)
Dept: GENERAL RADIOLOGY | Age: 60
DRG: 853 | End: 2020-08-08
Payer: COMMERCIAL

## 2020-08-08 VITALS
SYSTOLIC BLOOD PRESSURE: 102 MMHG | TEMPERATURE: 99.5 F | OXYGEN SATURATION: 100 % | RESPIRATION RATE: 10 BRPM | DIASTOLIC BLOOD PRESSURE: 57 MMHG

## 2020-08-08 LAB
ABO: NORMAL
ALLEN TEST: POSITIVE
AMORPHOUS: ABNORMAL
ANION GAP SERPL CALCULATED.3IONS-SCNC: 12 MEQ/L (ref 8–16)
ANION GAP SERPL CALCULATED.3IONS-SCNC: 13 MEQ/L (ref 8–16)
ANION GAP SERPL CALCULATED.3IONS-SCNC: 8 MEQ/L (ref 8–16)
ANTIBODY SCREEN: NORMAL
BACTERIA: ABNORMAL /HPF
BACTERIA: ABNORMAL /HPF
BASE EXCESS (CALCULATED): -4.6 MMOL/L (ref -2.5–2.5)
BASE EXCESS (CALCULATED): -7.6 MMOL/L (ref -2.5–2.5)
BASOPHILS # BLD: 0.5 %
BASOPHILS ABSOLUTE: 0 THOU/MM3 (ref 0–0.1)
BILIRUBIN URINE: NEGATIVE
BILIRUBIN URINE: NEGATIVE
BLOOD, URINE: ABNORMAL
BLOOD, URINE: NEGATIVE
BUN BLDV-MCNC: 19 MG/DL (ref 7–22)
BUN BLDV-MCNC: 21 MG/DL (ref 7–22)
BUN BLDV-MCNC: 23 MG/DL (ref 7–22)
CALCIUM IONIZED SERUM: 1.08 MMOL/L (ref 1.12–1.32)
CALCIUM IONIZED: 1.05 MMOL/L (ref 1.12–1.32)
CALCIUM IONIZED: 1.18 MMOL/L (ref 1.12–1.32)
CALCIUM SERPL-MCNC: 7.2 MG/DL (ref 8.5–10.5)
CALCIUM SERPL-MCNC: 7.6 MG/DL (ref 8.5–10.5)
CALCIUM SERPL-MCNC: 7.9 MG/DL (ref 8.5–10.5)
CASTS 2: ABNORMAL /LPF
CASTS 2: ABNORMAL /LPF
CASTS UA: ABNORMAL /LPF
CASTS UA: ABNORMAL /LPF
CHARACTER, URINE: ABNORMAL
CHARACTER, URINE: ABNORMAL
CHLORIDE BLD-SCNC: 108 MEQ/L (ref 98–111)
CHLORIDE BLD-SCNC: 109 MEQ/L (ref 98–111)
CHLORIDE BLD-SCNC: 111 MEQ/L (ref 98–111)
CO2: 18 MEQ/L (ref 23–33)
CO2: 18 MEQ/L (ref 23–33)
CO2: 19 MEQ/L (ref 23–33)
COLLECTED BY:: ABNORMAL
COLLECTED BY:: ABNORMAL
COLOR: YELLOW
COLOR: YELLOW
CORTISOL COLLECTION INFO: NORMAL
CORTISOL: 37.68 UG/DL
CREAT SERPL-MCNC: 0.7 MG/DL (ref 0.4–1.2)
CREAT SERPL-MCNC: 0.7 MG/DL (ref 0.4–1.2)
CREAT SERPL-MCNC: 0.8 MG/DL (ref 0.4–1.2)
CRYSTALS, UA: ABNORMAL
CRYSTALS, UA: ABNORMAL
DEVICE: ABNORMAL
EOSINOPHIL # BLD: 0 %
EOSINOPHILS ABSOLUTE: 0 THOU/MM3 (ref 0–0.4)
EPITHELIAL CELLS, UA: ABNORMAL /HPF
EPITHELIAL CELLS, UA: ABNORMAL /HPF
ERYTHROCYTE [DISTWIDTH] IN BLOOD BY AUTOMATED COUNT: 12.7 % (ref 11.5–14.5)
ERYTHROCYTE [DISTWIDTH] IN BLOOD BY AUTOMATED COUNT: 45.7 FL (ref 35–45)
GFR SERPL CREATININE-BSD FRML MDRD: 73 ML/MIN/1.73M2
GFR SERPL CREATININE-BSD FRML MDRD: 85 ML/MIN/1.73M2
GFR SERPL CREATININE-BSD FRML MDRD: 85 ML/MIN/1.73M2
GLUCOSE BLD-MCNC: 150 MG/DL (ref 70–108)
GLUCOSE BLD-MCNC: 154 MG/DL (ref 70–108)
GLUCOSE BLD-MCNC: 94 MG/DL (ref 70–108)
GLUCOSE BLD-MCNC: 98 MG/DL (ref 70–108)
GLUCOSE URINE: NEGATIVE MG/DL
GLUCOSE URINE: NEGATIVE MG/DL
GLUCOSE, WHOLE BLOOD: 157 MG/DL (ref 70–108)
GLUCOSE, WHOLE BLOOD: 89 MG/DL (ref 70–108)
HCO3: 18 MMOL/L (ref 23–28)
HCO3: 22 MMOL/L (ref 23–28)
HCT VFR BLD CALC: 37.1 % (ref 37–47)
HCT VFR BLD CALC: 40.1 % (ref 37–47)
HEMOGLOBIN FINGERSTICK, POC: 10.8 G/DL (ref 12–16)
HEMOGLOBIN: 11.3 GM/DL (ref 12–16)
HEMOGLOBIN: 12 GM/DL (ref 12–16)
IFIO2: 30
IMMATURE GRANS (ABS): 0.31 THOU/MM3 (ref 0–0.07)
IMMATURE GRANULOCYTES: 4 %
KETONES, URINE: ABNORMAL
KETONES, URINE: ABNORMAL
LACTIC ACID: 2 MMOL/L (ref 0.5–2.2)
LEUKOCYTE ESTERASE, URINE: NEGATIVE
LEUKOCYTE ESTERASE, URINE: NEGATIVE
LYMPHOCYTES # BLD: 5.6 %
LYMPHOCYTES ABSOLUTE: 0.4 THOU/MM3 (ref 1–4.8)
MAGNESIUM: 1.6 MG/DL (ref 1.6–2.4)
MCH RBC QN AUTO: 29.7 PG (ref 26–33)
MCHC RBC AUTO-ENTMCNC: 30.5 GM/DL (ref 32.2–35.5)
MCV RBC AUTO: 97.6 FL (ref 81–99)
MISCELLANEOUS 2: ABNORMAL
MODE: ABNORMAL
MONOCYTES # BLD: 7.5 %
MONOCYTES ABSOLUTE: 0.6 THOU/MM3 (ref 0.4–1.3)
MUCUS: ABNORMAL
NITRITE, URINE: NEGATIVE
NITRITE, URINE: NEGATIVE
NUCLEATED RED BLOOD CELLS: 0 /100 WBC
O2 SATURATION: 93 %
O2 SATURATION: 99 %
PCO2: 35 MMHG (ref 35–45)
PCO2: 44 MMHG (ref 35–45)
PH BLOOD GAS: 7.3 (ref 7.35–7.45)
PH BLOOD GAS: 7.31 (ref 7.35–7.45)
PH UA: 5 (ref 5–9)
PH UA: 6 (ref 5–9)
PIP: 22 CMH2O
PLATELET # BLD: 255 THOU/MM3 (ref 130–400)
PLATELET ESTIMATE: ADEQUATE
PMV BLD AUTO: 10.2 FL (ref 9.4–12.4)
PO2: 140 MMHG (ref 71–104)
PO2: 75 MMHG (ref 71–104)
POTASSIUM REFLEX MAGNESIUM: 3.5 MEQ/L (ref 3.5–5.2)
POTASSIUM SERPL-SCNC: 3.9 MEQ/L (ref 3.5–5.2)
POTASSIUM SERPL-SCNC: 4 MEQ/L (ref 3.5–5.2)
POTASSIUM, WHOLE BLOOD: 3.3 MEQ/L (ref 3.5–4.9)
PROCALCITONIN: 10.35 NG/ML (ref 0.01–0.09)
PROTEIN UA: 100
PROTEIN UA: ABNORMAL
RBC # BLD: 3.8 MILL/MM3 (ref 4.2–5.4)
RBC URINE: ABNORMAL /HPF
RBC URINE: ABNORMAL /HPF
REASON FOR REJECTION: NORMAL
REJECTED TEST: NORMAL
RENAL EPITHELIAL, UA: ABNORMAL
RH FACTOR: NORMAL
SCAN OF BLOOD SMEAR: NORMAL
SEG NEUTROPHILS: 82.4 %
SEGMENTED NEUTROPHILS ABSOLUTE COUNT: 6.3 THOU/MM3 (ref 1.8–7.7)
SET PEEP: 6 MMHG
SET RESPIRATORY RATE: 12 BPM
SODIUM BLD-SCNC: 138 MEQ/L (ref 135–145)
SODIUM BLD-SCNC: 139 MEQ/L (ref 135–145)
SODIUM BLD-SCNC: 139 MEQ/L (ref 135–145)
SODIUM, WHOLE BLOOD: 140 MEQ/L (ref 138–146)
SOURCE, BLOOD GAS: ABNORMAL
SPECIFIC GRAVITY, URINE: 1.01 (ref 1–1.03)
SPECIFIC GRAVITY, URINE: 1.02 (ref 1–1.03)
UROBILINOGEN, URINE: 0.2 EU/DL (ref 0–1)
UROBILINOGEN, URINE: 1 EU/DL (ref 0–1)
WBC # BLD: 7.7 THOU/MM3 (ref 4.8–10.8)
WBC UA: ABNORMAL /HPF
WBC UA: ABNORMAL /HPF
YEAST: ABNORMAL

## 2020-08-08 PROCEDURE — 3E1M38Z IRRIGATION OF PERITONEAL CAVITY USING IRRIGATING SUBSTANCE, PERCUTANEOUS APPROACH: ICD-10-PCS | Performed by: SURGERY

## 2020-08-08 PROCEDURE — 6360000002 HC RX W HCPCS: Performed by: INTERNAL MEDICINE

## 2020-08-08 PROCEDURE — 6360000002 HC RX W HCPCS: Performed by: NURSE ANESTHETIST, CERTIFIED REGISTERED

## 2020-08-08 PROCEDURE — 36600 WITHDRAWAL OF ARTERIAL BLOOD: CPT

## 2020-08-08 PROCEDURE — 80048 BASIC METABOLIC PNL TOTAL CA: CPT

## 2020-08-08 PROCEDURE — 86850 RBC ANTIBODY SCREEN: CPT

## 2020-08-08 PROCEDURE — 94761 N-INVAS EAR/PLS OXIMETRY MLT: CPT

## 2020-08-08 PROCEDURE — 93010 ELECTROCARDIOGRAM REPORT: CPT | Performed by: INTERNAL MEDICINE

## 2020-08-08 PROCEDURE — 2500000003 HC RX 250 WO HCPCS: Performed by: NURSE PRACTITIONER

## 2020-08-08 PROCEDURE — 6360000002 HC RX W HCPCS: Performed by: ANESTHESIOLOGY

## 2020-08-08 PROCEDURE — 2580000003 HC RX 258: Performed by: NURSE ANESTHETIST, CERTIFIED REGISTERED

## 2020-08-08 PROCEDURE — 3700000001 HC ADD 15 MINUTES (ANESTHESIA): Performed by: OBSTETRICS & GYNECOLOGY

## 2020-08-08 PROCEDURE — 0WBH0ZZ EXCISION OF RETROPERITONEUM, OPEN APPROACH: ICD-10-PCS | Performed by: SURGERY

## 2020-08-08 PROCEDURE — 84295 ASSAY OF SERUM SODIUM: CPT

## 2020-08-08 PROCEDURE — 87205 SMEAR GRAM STAIN: CPT

## 2020-08-08 PROCEDURE — 6360000002 HC RX W HCPCS: Performed by: NURSE PRACTITIONER

## 2020-08-08 PROCEDURE — 84145 PROCALCITONIN (PCT): CPT

## 2020-08-08 PROCEDURE — 6370000000 HC RX 637 (ALT 250 FOR IP): Performed by: STUDENT IN AN ORGANIZED HEALTH CARE EDUCATION/TRAINING PROGRAM

## 2020-08-08 PROCEDURE — 87798 DETECT AGENT NOS DNA AMP: CPT

## 2020-08-08 PROCEDURE — 87070 CULTURE OTHR SPECIMN AEROBIC: CPT

## 2020-08-08 PROCEDURE — 2000000000 HC ICU R&B

## 2020-08-08 PROCEDURE — 5A1945Z RESPIRATORY VENTILATION, 24-96 CONSECUTIVE HOURS: ICD-10-PCS | Performed by: OBSTETRICS & GYNECOLOGY

## 2020-08-08 PROCEDURE — 86900 BLOOD TYPING SEROLOGIC ABO: CPT

## 2020-08-08 PROCEDURE — 89050 BODY FLUID CELL COUNT: CPT

## 2020-08-08 PROCEDURE — 87631 RESP VIRUS 3-5 TARGETS: CPT

## 2020-08-08 PROCEDURE — 83735 ASSAY OF MAGNESIUM: CPT

## 2020-08-08 PROCEDURE — 87086 URINE CULTURE/COLONY COUNT: CPT

## 2020-08-08 PROCEDURE — 71045 X-RAY EXAM CHEST 1 VIEW: CPT

## 2020-08-08 PROCEDURE — 6360000002 HC RX W HCPCS: Performed by: STUDENT IN AN ORGANIZED HEALTH CARE EDUCATION/TRAINING PROGRAM

## 2020-08-08 PROCEDURE — 81001 URINALYSIS AUTO W/SCOPE: CPT

## 2020-08-08 PROCEDURE — 85014 HEMATOCRIT: CPT

## 2020-08-08 PROCEDURE — 86901 BLOOD TYPING SEROLOGIC RH(D): CPT

## 2020-08-08 PROCEDURE — 87150 DNA/RNA AMPLIFIED PROBE: CPT

## 2020-08-08 PROCEDURE — 2500000003 HC RX 250 WO HCPCS: Performed by: NURSE ANESTHETIST, CERTIFIED REGISTERED

## 2020-08-08 PROCEDURE — 3700000000 HC ANESTHESIA ATTENDED CARE: Performed by: OBSTETRICS & GYNECOLOGY

## 2020-08-08 PROCEDURE — 3600000004 HC SURGERY LEVEL 4 BASE: Performed by: OBSTETRICS & GYNECOLOGY

## 2020-08-08 PROCEDURE — 6360000002 HC RX W HCPCS: Performed by: OBSTETRICS & GYNECOLOGY

## 2020-08-08 PROCEDURE — 85018 HEMOGLOBIN: CPT

## 2020-08-08 PROCEDURE — 2500000003 HC RX 250 WO HCPCS: Performed by: OBSTETRICS & GYNECOLOGY

## 2020-08-08 PROCEDURE — 49000 EXPLORATION OF ABDOMEN: CPT | Performed by: SURGERY

## 2020-08-08 PROCEDURE — 82533 TOTAL CORTISOL: CPT

## 2020-08-08 PROCEDURE — 82947 ASSAY GLUCOSE BLOOD QUANT: CPT

## 2020-08-08 PROCEDURE — 87541 LEGION PNEUMO DNA AMP PROB: CPT

## 2020-08-08 PROCEDURE — 82330 ASSAY OF CALCIUM: CPT

## 2020-08-08 PROCEDURE — 99999 PR OFFICE/OUTPT VISIT,PROCEDURE ONLY: CPT | Performed by: NURSE PRACTITIONER

## 2020-08-08 PROCEDURE — 6370000000 HC RX 637 (ALT 250 FOR IP): Performed by: NURSE PRACTITIONER

## 2020-08-08 PROCEDURE — 82803 BLOOD GASES ANY COMBINATION: CPT

## 2020-08-08 PROCEDURE — 88307 TISSUE EXAM BY PATHOLOGIST: CPT

## 2020-08-08 PROCEDURE — 36556 INSERT NON-TUNNEL CV CATH: CPT | Performed by: NURSE PRACTITIONER

## 2020-08-08 PROCEDURE — 87077 CULTURE AEROBIC IDENTIFY: CPT

## 2020-08-08 PROCEDURE — 87486 CHLMYD PNEUM DNA AMP PROBE: CPT

## 2020-08-08 PROCEDURE — 86923 COMPATIBILITY TEST ELECTRIC: CPT

## 2020-08-08 PROCEDURE — 02HV33Z INSERTION OF INFUSION DEVICE INTO SUPERIOR VENA CAVA, PERCUTANEOUS APPROACH: ICD-10-PCS | Performed by: INTERNAL MEDICINE

## 2020-08-08 PROCEDURE — 2580000003 HC RX 258: Performed by: OBSTETRICS & GYNECOLOGY

## 2020-08-08 PROCEDURE — 2500000003 HC RX 250 WO HCPCS: Performed by: STUDENT IN AN ORGANIZED HEALTH CARE EDUCATION/TRAINING PROGRAM

## 2020-08-08 PROCEDURE — 83605 ASSAY OF LACTIC ACID: CPT

## 2020-08-08 PROCEDURE — 36592 COLLECT BLOOD FROM PICC: CPT

## 2020-08-08 PROCEDURE — 94002 VENT MGMT INPAT INIT DAY: CPT

## 2020-08-08 PROCEDURE — 3600000014 HC SURGERY LEVEL 4 ADDTL 15MIN: Performed by: OBSTETRICS & GYNECOLOGY

## 2020-08-08 PROCEDURE — 85025 COMPLETE CBC W/AUTO DIFF WBC: CPT

## 2020-08-08 PROCEDURE — 82948 REAGENT STRIP/BLOOD GLUCOSE: CPT

## 2020-08-08 PROCEDURE — P9041 ALBUMIN (HUMAN),5%, 50ML: HCPCS | Performed by: NURSE PRACTITIONER

## 2020-08-08 PROCEDURE — 84132 ASSAY OF SERUM POTASSIUM: CPT

## 2020-08-08 PROCEDURE — 2500000003 HC RX 250 WO HCPCS

## 2020-08-08 PROCEDURE — 2709999900 HC NON-CHARGEABLE SUPPLY: Performed by: OBSTETRICS & GYNECOLOGY

## 2020-08-08 PROCEDURE — 2580000003 HC RX 258: Performed by: STUDENT IN AN ORGANIZED HEALTH CARE EDUCATION/TRAINING PROGRAM

## 2020-08-08 PROCEDURE — 6360000002 HC RX W HCPCS

## 2020-08-08 PROCEDURE — 99291 CRITICAL CARE FIRST HOUR: CPT | Performed by: INTERNAL MEDICINE

## 2020-08-08 PROCEDURE — 36415 COLL VENOUS BLD VENIPUNCTURE: CPT

## 2020-08-08 PROCEDURE — 2580000003 HC RX 258: Performed by: FAMILY MEDICINE

## 2020-08-08 PROCEDURE — 2580000003 HC RX 258: Performed by: NURSE PRACTITIONER

## 2020-08-08 PROCEDURE — 2500000003 HC RX 250 WO HCPCS: Performed by: ANESTHESIOLOGY

## 2020-08-08 PROCEDURE — 87581 M.PNEUMON DNA AMP PROBE: CPT

## 2020-08-08 PROCEDURE — 2700000000 HC OXYGEN THERAPY PER DAY

## 2020-08-08 PROCEDURE — 36556 INSERT NON-TUNNEL CV CATH: CPT

## 2020-08-08 PROCEDURE — 87075 CULTR BACTERIA EXCEPT BLOOD: CPT

## 2020-08-08 RX ORDER — ALBUMIN, HUMAN INJ 5% 5 %
25 SOLUTION INTRAVENOUS ONCE
Status: COMPLETED | OUTPATIENT
Start: 2020-08-08 | End: 2020-08-08

## 2020-08-08 RX ORDER — SODIUM CHLORIDE 9 MG/ML
INJECTION, SOLUTION INTRAVENOUS CONTINUOUS PRN
Status: DISCONTINUED | OUTPATIENT
Start: 2020-08-08 | End: 2020-08-08 | Stop reason: SDUPTHER

## 2020-08-08 RX ORDER — SUCCINYLCHOLINE/SOD CL,ISO/PF 200MG/10ML
SYRINGE (ML) INTRAVENOUS PRN
Status: DISCONTINUED | OUTPATIENT
Start: 2020-08-08 | End: 2020-08-08 | Stop reason: SDUPTHER

## 2020-08-08 RX ORDER — DEXTROSE MONOHYDRATE 50 MG/ML
100 INJECTION, SOLUTION INTRAVENOUS PRN
Status: DISCONTINUED | OUTPATIENT
Start: 2020-08-08 | End: 2020-08-18 | Stop reason: HOSPADM

## 2020-08-08 RX ORDER — FENTANYL CITRATE 50 UG/ML
25 INJECTION, SOLUTION INTRAMUSCULAR; INTRAVENOUS
Status: DISCONTINUED | OUTPATIENT
Start: 2020-08-08 | End: 2020-08-11

## 2020-08-08 RX ORDER — 0.9 % SODIUM CHLORIDE 0.9 %
1000 INTRAVENOUS SOLUTION INTRAVENOUS ONCE
Status: COMPLETED | OUTPATIENT
Start: 2020-08-08 | End: 2020-08-08

## 2020-08-08 RX ORDER — FENTANYL CITRATE 50 UG/ML
50 INJECTION, SOLUTION INTRAMUSCULAR; INTRAVENOUS EVERY 5 MIN PRN
Status: DISCONTINUED | OUTPATIENT
Start: 2020-08-08 | End: 2020-08-08 | Stop reason: HOSPADM

## 2020-08-08 RX ORDER — MORPHINE SULFATE 2 MG/ML
INJECTION, SOLUTION INTRAMUSCULAR; INTRAVENOUS
Status: DISCONTINUED
Start: 2020-08-08 | End: 2020-08-08

## 2020-08-08 RX ORDER — FENTANYL CITRATE 50 UG/ML
25 INJECTION, SOLUTION INTRAMUSCULAR; INTRAVENOUS EVERY 5 MIN PRN
Status: DISCONTINUED | OUTPATIENT
Start: 2020-08-08 | End: 2020-08-08 | Stop reason: HOSPADM

## 2020-08-08 RX ORDER — FENTANYL CITRATE 50 UG/ML
50 INJECTION, SOLUTION INTRAMUSCULAR; INTRAVENOUS ONCE
Status: COMPLETED | OUTPATIENT
Start: 2020-08-08 | End: 2020-08-08

## 2020-08-08 RX ORDER — DEXTROSE MONOHYDRATE 25 G/50ML
12.5 INJECTION, SOLUTION INTRAVENOUS PRN
Status: DISCONTINUED | OUTPATIENT
Start: 2020-08-08 | End: 2020-08-18 | Stop reason: HOSPADM

## 2020-08-08 RX ORDER — OXYCODONE HYDROCHLORIDE AND ACETAMINOPHEN 5; 325 MG/1; MG/1
1 TABLET ORAL EVERY 8 HOURS PRN
Status: DISCONTINUED | OUTPATIENT
Start: 2020-08-08 | End: 2020-08-08

## 2020-08-08 RX ORDER — MORPHINE SULFATE 4 MG/ML
4 INJECTION, SOLUTION INTRAMUSCULAR; INTRAVENOUS ONCE
Status: COMPLETED | OUTPATIENT
Start: 2020-08-08 | End: 2020-08-08

## 2020-08-08 RX ORDER — LABETALOL 20 MG/4 ML (5 MG/ML) INTRAVENOUS SYRINGE
5 EVERY 10 MIN PRN
Status: DISCONTINUED | OUTPATIENT
Start: 2020-08-08 | End: 2020-08-08 | Stop reason: HOSPADM

## 2020-08-08 RX ORDER — CHLORHEXIDINE GLUCONATE 0.12 MG/ML
15 RINSE ORAL 2 TIMES DAILY
Status: DISCONTINUED | OUTPATIENT
Start: 2020-08-08 | End: 2020-08-12

## 2020-08-08 RX ORDER — POTASSIUM CHLORIDE 7.45 MG/ML
10 INJECTION INTRAVENOUS PRN
Status: DISCONTINUED | OUTPATIENT
Start: 2020-08-08 | End: 2020-08-18 | Stop reason: HOSPADM

## 2020-08-08 RX ORDER — METOCLOPRAMIDE HYDROCHLORIDE 5 MG/ML
10 INJECTION INTRAMUSCULAR; INTRAVENOUS
Status: DISCONTINUED | OUTPATIENT
Start: 2020-08-08 | End: 2020-08-08 | Stop reason: HOSPADM

## 2020-08-08 RX ORDER — POTASSIUM CHLORIDE 20 MEQ/1
40 TABLET, EXTENDED RELEASE ORAL PRN
Status: DISCONTINUED | OUTPATIENT
Start: 2020-08-08 | End: 2020-08-18 | Stop reason: HOSPADM

## 2020-08-08 RX ORDER — DOXYCYCLINE 100 MG/10ML
INJECTION, POWDER, LYOPHILIZED, FOR SOLUTION INTRAVENOUS PRN
Status: DISCONTINUED | OUTPATIENT
Start: 2020-08-08 | End: 2020-08-08 | Stop reason: SDUPTHER

## 2020-08-08 RX ORDER — PROPOFOL 10 MG/ML
INJECTION, EMULSION INTRAVENOUS
Status: COMPLETED
Start: 2020-08-08 | End: 2020-08-08

## 2020-08-08 RX ORDER — PROMETHAZINE HYDROCHLORIDE 25 MG/ML
12.5 INJECTION, SOLUTION INTRAMUSCULAR; INTRAVENOUS
Status: DISCONTINUED | OUTPATIENT
Start: 2020-08-08 | End: 2020-08-08 | Stop reason: HOSPADM

## 2020-08-08 RX ORDER — MEPERIDINE HYDROCHLORIDE 25 MG/ML
12.5 INJECTION INTRAMUSCULAR; INTRAVENOUS; SUBCUTANEOUS EVERY 5 MIN PRN
Status: DISCONTINUED | OUTPATIENT
Start: 2020-08-08 | End: 2020-08-08 | Stop reason: HOSPADM

## 2020-08-08 RX ORDER — FENTANYL CITRATE 50 UG/ML
INJECTION, SOLUTION INTRAMUSCULAR; INTRAVENOUS PRN
Status: DISCONTINUED | OUTPATIENT
Start: 2020-08-08 | End: 2020-08-08 | Stop reason: SDUPTHER

## 2020-08-08 RX ORDER — NICOTINE POLACRILEX 4 MG
15 LOZENGE BUCCAL PRN
Status: DISCONTINUED | OUTPATIENT
Start: 2020-08-08 | End: 2020-08-18 | Stop reason: HOSPADM

## 2020-08-08 RX ORDER — PROPOFOL 10 MG/ML
INJECTION, EMULSION INTRAVENOUS PRN
Status: DISCONTINUED | OUTPATIENT
Start: 2020-08-08 | End: 2020-08-08 | Stop reason: SDUPTHER

## 2020-08-08 RX ORDER — PROPOFOL 10 MG/ML
10 INJECTION, EMULSION INTRAVENOUS
Status: DISCONTINUED | OUTPATIENT
Start: 2020-08-08 | End: 2020-08-08

## 2020-08-08 RX ORDER — DIPHENHYDRAMINE HYDROCHLORIDE 50 MG/ML
12.5 INJECTION INTRAMUSCULAR; INTRAVENOUS
Status: DISCONTINUED | OUTPATIENT
Start: 2020-08-08 | End: 2020-08-08 | Stop reason: HOSPADM

## 2020-08-08 RX ORDER — ROCURONIUM BROMIDE 10 MG/ML
INJECTION, SOLUTION INTRAVENOUS PRN
Status: DISCONTINUED | OUTPATIENT
Start: 2020-08-08 | End: 2020-08-08 | Stop reason: SDUPTHER

## 2020-08-08 RX ORDER — LIDOCAINE HCL/PF 100 MG/5ML
SYRINGE (ML) INJECTION PRN
Status: DISCONTINUED | OUTPATIENT
Start: 2020-08-08 | End: 2020-08-08 | Stop reason: SDUPTHER

## 2020-08-08 RX ORDER — POTASSIUM CHLORIDE 29.8 MG/ML
20 INJECTION INTRAVENOUS PRN
Status: DISCONTINUED | OUTPATIENT
Start: 2020-08-08 | End: 2020-08-18 | Stop reason: HOSPADM

## 2020-08-08 RX ORDER — SODIUM CHLORIDE, SODIUM LACTATE, POTASSIUM CHLORIDE, CALCIUM CHLORIDE 600; 310; 30; 20 MG/100ML; MG/100ML; MG/100ML; MG/100ML
INJECTION, SOLUTION INTRAVENOUS CONTINUOUS
Status: ACTIVE | OUTPATIENT
Start: 2020-08-08 | End: 2020-08-14

## 2020-08-08 RX ORDER — PROPOFOL 10 MG/ML
10 INJECTION, EMULSION INTRAVENOUS
Status: DISCONTINUED | OUTPATIENT
Start: 2020-08-08 | End: 2020-08-09

## 2020-08-08 RX ORDER — CALCIUM CHLORIDE 100 MG/ML
INJECTION INTRAVENOUS; INTRAVENTRICULAR PRN
Status: DISCONTINUED | OUTPATIENT
Start: 2020-08-08 | End: 2020-08-08 | Stop reason: SDUPTHER

## 2020-08-08 RX ADMIN — SODIUM CHLORIDE, PRESERVATIVE FREE 10 ML: 5 INJECTION INTRAVENOUS at 10:30

## 2020-08-08 RX ADMIN — CEFEPIME 2 G: 2 INJECTION, POWDER, FOR SOLUTION INTRAMUSCULAR; INTRAVENOUS at 13:26

## 2020-08-08 RX ADMIN — Medication 15 ML: at 19:57

## 2020-08-08 RX ADMIN — Medication 100 MG: at 07:01

## 2020-08-08 RX ADMIN — FAMOTIDINE 20 MG: 10 INJECTION, SOLUTION INTRAVENOUS at 11:17

## 2020-08-08 RX ADMIN — FENTANYL CITRATE 25 MCG: 50 INJECTION INTRAMUSCULAR; INTRAVENOUS at 11:17

## 2020-08-08 RX ADMIN — FAMOTIDINE 20 MG: 10 INJECTION, SOLUTION INTRAVENOUS at 19:57

## 2020-08-08 RX ADMIN — SODIUM CHLORIDE 1000 ML: 9 INJECTION, SOLUTION INTRAVENOUS at 03:29

## 2020-08-08 RX ADMIN — FENTANYL CITRATE 100 MCG: 50 INJECTION, SOLUTION INTRAMUSCULAR; INTRAVENOUS at 07:01

## 2020-08-08 RX ADMIN — ROCURONIUM BROMIDE 20 MG: 10 INJECTION INTRAVENOUS at 08:24

## 2020-08-08 RX ADMIN — CEFEPIME 2 G: 2 INJECTION, POWDER, FOR SOLUTION INTRAMUSCULAR; INTRAVENOUS at 01:26

## 2020-08-08 RX ADMIN — Medication 120 MG: at 07:01

## 2020-08-08 RX ADMIN — PROPOFOL 20 MCG/KG/MIN: 10 INJECTION, EMULSION INTRAVENOUS at 17:36

## 2020-08-08 RX ADMIN — FAMOTIDINE 20 MG: 10 INJECTION, SOLUTION INTRAVENOUS at 05:33

## 2020-08-08 RX ADMIN — SODIUM CHLORIDE: 9 INJECTION, SOLUTION INTRAVENOUS at 01:55

## 2020-08-08 RX ADMIN — FENTANYL CITRATE 50 MCG: 50 INJECTION, SOLUTION INTRAMUSCULAR; INTRAVENOUS at 19:30

## 2020-08-08 RX ADMIN — SODIUM CHLORIDE: 9 INJECTION, SOLUTION INTRAVENOUS at 09:05

## 2020-08-08 RX ADMIN — OXYCODONE HYDROCHLORIDE AND ACETAMINOPHEN 1 TABLET: 7.5; 325 TABLET ORAL at 01:22

## 2020-08-08 RX ADMIN — MORPHINE SULFATE 4 MG: 4 INJECTION, SOLUTION INTRAMUSCULAR; INTRAVENOUS at 05:05

## 2020-08-08 RX ADMIN — SODIUM CHLORIDE: 9 INJECTION, SOLUTION INTRAVENOUS at 06:24

## 2020-08-08 RX ADMIN — ACETAMINOPHEN 650 MG: 325 TABLET ORAL at 01:00

## 2020-08-08 RX ADMIN — PHENYLEPHRINE HYDROCHLORIDE 100 MCG: 10 INJECTION INTRAVENOUS at 07:13

## 2020-08-08 RX ADMIN — METRONIDAZOLE 500 MG: 500 INJECTION, SOLUTION INTRAVENOUS at 01:02

## 2020-08-08 RX ADMIN — CALCIUM CHLORIDE 1 G: 100 INJECTION, SOLUTION INTRAVENOUS; INTRAVENTRICULAR at 07:56

## 2020-08-08 RX ADMIN — FENTANYL CITRATE 25 MCG: 50 INJECTION INTRAMUSCULAR; INTRAVENOUS at 14:13

## 2020-08-08 RX ADMIN — FENTANYL CITRATE 25 MCG: 50 INJECTION INTRAMUSCULAR; INTRAVENOUS at 16:36

## 2020-08-08 RX ADMIN — METRONIDAZOLE 500 MG: 500 INJECTION, SOLUTION INTRAVENOUS at 08:46

## 2020-08-08 RX ADMIN — ROCURONIUM BROMIDE 50 MG: 10 INJECTION INTRAVENOUS at 07:19

## 2020-08-08 RX ADMIN — PROPOFOL 100 MG: 10 INJECTION, EMULSION INTRAVENOUS at 07:01

## 2020-08-08 RX ADMIN — SODIUM CHLORIDE: 9 INJECTION, SOLUTION INTRAVENOUS at 08:18

## 2020-08-08 RX ADMIN — DOXYCYCLINE 100 MG: 100 INJECTION, POWDER, LYOPHILIZED, FOR SOLUTION INTRAVENOUS at 08:45

## 2020-08-08 RX ADMIN — PHENYLEPHRINE HYDROCHLORIDE 200 MCG: 10 INJECTION INTRAVENOUS at 07:06

## 2020-08-08 RX ADMIN — PROPOFOL 20 MCG/KG/MIN: 10 INJECTION, EMULSION INTRAVENOUS at 10:33

## 2020-08-08 RX ADMIN — ONDANSETRON 4 MG: 2 INJECTION INTRAMUSCULAR; INTRAVENOUS at 05:03

## 2020-08-08 RX ADMIN — Medication 25 MCG/HR: at 20:36

## 2020-08-08 RX ADMIN — INSULIN LISPRO 1 UNITS: 100 INJECTION, SOLUTION INTRAVENOUS; SUBCUTANEOUS at 19:59

## 2020-08-08 RX ADMIN — ENOXAPARIN SODIUM 40 MG: 40 INJECTION SUBCUTANEOUS at 19:57

## 2020-08-08 RX ADMIN — SODIUM CHLORIDE, POTASSIUM CHLORIDE, SODIUM LACTATE AND CALCIUM CHLORIDE: 600; 310; 30; 20 INJECTION, SOLUTION INTRAVENOUS at 20:08

## 2020-08-08 RX ADMIN — SODIUM CHLORIDE, PRESERVATIVE FREE 10 ML: 5 INJECTION INTRAVENOUS at 19:57

## 2020-08-08 RX ADMIN — ONDANSETRON 4 MG: 2 INJECTION INTRAMUSCULAR; INTRAVENOUS at 01:59

## 2020-08-08 RX ADMIN — ALBUMIN (HUMAN) 25 G: 12.5 INJECTION, SOLUTION INTRAVENOUS at 16:28

## 2020-08-08 RX ADMIN — METRONIDAZOLE 500 MG: 500 INJECTION, SOLUTION INTRAVENOUS at 16:28

## 2020-08-08 RX ADMIN — SODIUM CHLORIDE, POTASSIUM CHLORIDE, SODIUM LACTATE AND CALCIUM CHLORIDE: 600; 310; 30; 20 INJECTION, SOLUTION INTRAVENOUS at 10:31

## 2020-08-08 RX ADMIN — NOREPINEPHRINE BITARTRATE 5 MCG/MIN: 1 INJECTION, SOLUTION, CONCENTRATE INTRAVENOUS at 05:00

## 2020-08-08 RX ADMIN — Medication 15 ML: at 11:16

## 2020-08-08 ASSESSMENT — PAIN DESCRIPTION - ONSET: ONSET: ON-GOING

## 2020-08-08 ASSESSMENT — PULMONARY FUNCTION TESTS
PIF_VALUE: 26
PIF_VALUE: 20
PIF_VALUE: 2
PIF_VALUE: 26
PIF_VALUE: 27
PIF_VALUE: 2
PIF_VALUE: 25
PIF_VALUE: 25
PIF_VALUE: 24
PIF_VALUE: 27
PIF_VALUE: 27
PIF_VALUE: 28
PIF_VALUE: 25
PIF_VALUE: 27
PIF_VALUE: 27
PIF_VALUE: 30
PIF_VALUE: 24
PIF_VALUE: 25
PIF_VALUE: 8
PIF_VALUE: 26
PIF_VALUE: 26
PIF_VALUE: 25
PIF_VALUE: 25
PIF_VALUE: 26
PIF_VALUE: 28
PIF_VALUE: 26
PIF_VALUE: 27
PIF_VALUE: 27
PIF_VALUE: 28
PIF_VALUE: 24
PIF_VALUE: 29
PIF_VALUE: 26
PIF_VALUE: 27
PIF_VALUE: 27
PIF_VALUE: 28
PIF_VALUE: 25
PIF_VALUE: 27
PIF_VALUE: 28
PIF_VALUE: 25
PIF_VALUE: 22
PIF_VALUE: 24
PIF_VALUE: 26
PIF_VALUE: 28
PIF_VALUE: 28
PIF_VALUE: 27
PIF_VALUE: 25
PIF_VALUE: 27
PIF_VALUE: 27
PIF_VALUE: 28
PIF_VALUE: 25
PIF_VALUE: 24
PIF_VALUE: 25
PIF_VALUE: 27
PIF_VALUE: 27
PIF_VALUE: 29
PIF_VALUE: 27
PIF_VALUE: 1
PIF_VALUE: 22
PIF_VALUE: 27
PIF_VALUE: 31
PIF_VALUE: 27
PIF_VALUE: 35
PIF_VALUE: 27
PIF_VALUE: 25
PIF_VALUE: 25
PIF_VALUE: 26
PIF_VALUE: 27
PIF_VALUE: 28
PIF_VALUE: 27
PIF_VALUE: 2
PIF_VALUE: 2
PIF_VALUE: 27
PIF_VALUE: 26
PIF_VALUE: 28
PIF_VALUE: 0
PIF_VALUE: 27
PIF_VALUE: 0
PIF_VALUE: 28
PIF_VALUE: 28
PIF_VALUE: 27
PIF_VALUE: 28
PIF_VALUE: 27
PIF_VALUE: 2
PIF_VALUE: 25
PIF_VALUE: 15
PIF_VALUE: 28
PIF_VALUE: 19
PIF_VALUE: 28
PIF_VALUE: 24
PIF_VALUE: 26
PIF_VALUE: 27
PIF_VALUE: 25
PIF_VALUE: 26
PIF_VALUE: 27
PIF_VALUE: 25
PIF_VALUE: 27
PIF_VALUE: 26
PIF_VALUE: 26
PIF_VALUE: 27
PIF_VALUE: 28
PIF_VALUE: 28
PIF_VALUE: 26
PIF_VALUE: 31
PIF_VALUE: 24
PIF_VALUE: 28
PIF_VALUE: 25
PIF_VALUE: 27
PIF_VALUE: 27
PIF_VALUE: 25
PIF_VALUE: 27
PIF_VALUE: 28
PIF_VALUE: 19
PIF_VALUE: 28
PIF_VALUE: 27
PIF_VALUE: 26
PIF_VALUE: 27
PIF_VALUE: 27
PIF_VALUE: 28
PIF_VALUE: 25
PIF_VALUE: 28
PIF_VALUE: 28
PIF_VALUE: 25
PIF_VALUE: 25
PIF_VALUE: 24
PIF_VALUE: 25

## 2020-08-08 ASSESSMENT — PAIN SCALES - GENERAL
PAINLEVEL_OUTOF10: 10
PAINLEVEL_OUTOF10: 10
PAINLEVEL_OUTOF10: 7
PAINLEVEL_OUTOF10: 10
PAINLEVEL_OUTOF10: 8

## 2020-08-08 ASSESSMENT — PAIN DESCRIPTION - PROGRESSION
CLINICAL_PROGRESSION: NOT CHANGED

## 2020-08-08 ASSESSMENT — PAIN DESCRIPTION - FREQUENCY: FREQUENCY: CONTINUOUS

## 2020-08-08 ASSESSMENT — PAIN - FUNCTIONAL ASSESSMENT: PAIN_FUNCTIONAL_ASSESSMENT: PREVENTS OR INTERFERES SOME ACTIVE ACTIVITIES AND ADLS

## 2020-08-08 ASSESSMENT — PAIN DESCRIPTION - PAIN TYPE: TYPE: ACUTE PAIN

## 2020-08-08 ASSESSMENT — PAIN DESCRIPTION - ORIENTATION: ORIENTATION: RIGHT;LEFT;MID;LOWER

## 2020-08-08 ASSESSMENT — PAIN DESCRIPTION - LOCATION: LOCATION: ABDOMEN

## 2020-08-08 ASSESSMENT — PAIN DESCRIPTION - DESCRIPTORS: DESCRIPTORS: ACHING;CONSTANT;SHARP

## 2020-08-08 NOTE — PROGRESS NOTES
Called with change in patient condition. Pt with much more pain thru the night and becoming hypotensive despite fluid boluses. Now in the ICU    She reports no flatus and abd distension  tmax 103 now 98 p 122 /82 on levofed  A+O x 3  Resting in bed,  abd distended and tender. No bowel sounds  Ext no CCE diaphoretic    WBC 7.7  Hb 11  Need to proceed with surgical exploration given deteriorating condition. Discussion with the patient and/ or family for proposed care, treatment, services; benefits, risks, side effects; likelihood of achieving goals and potential problems that may occur during recuperation was had and all questions were answered. Discussion with the patient and/ or family of reasonable alternatives to the proposed care, treatment, services and the discussion of the risks, benefits, side effects related to the alternatives and the risk related to not receiving the proposed care treatment services was also had and all questions were answered. Renzo Deluca and I spoke in the presence of barbara regarding serious nature of disease process and need for exp laparotomy. Disc risks incl bleeding infection injury to bowel bladder possible NICOLE/BSO possible need for general surgery intervention.     Gabriel Boles 8/8/2020 6:33 AM

## 2020-08-08 NOTE — PROGRESS NOTES
Patient admitted for ovarian mass with some postmenopausal bleeding. Overnight worsening abdominal pain with blood pressures 90s over 50s initially. Increased dose of home oxycodone which did not help with abdominal pain. Patient was getting maintenance IV fluids at 150 cc/h. Blood pressures continued to worsen 80s over 50s, given normal saline boluses x1, with no improvement of blood pressures. Last blood pressure 70 over 40s. Another bolus of normal saline ordered. Case discussed with ICU to transfer patient to the unit for possible pressor support. Patient's blood pressures in the room 60s over 40s with some lightheadedness. ICU LASHON at bedside evaluating patient for transfer. Patient will be transferred to the ICU. Transfer orders placed.

## 2020-08-08 NOTE — CONSULTS
CRITICAL CARE CONSULT NOTE      Patient:  Larisa Grider    Unit/Bed:4K-04/004-A  YOB: 1960  MRN: [id number]   PCP: Bennett Ha MD  Date of Admission: 8/7/2020  Chief Complaint:- hypotension, abdominal pain    Assessment and Plan:    1. Hypotension: Likely secondary to septic shock. Patient was not responsive to 0.9 normal saline fluid bolusing. Patient did require Levophed to maintain mean arterial pressure greater than 65.  2. Large left adnexal abscess: 8 7-2020 transvaginal ultrasound and CT abdomen pelvis noted large complex lesion in the left ovary related to abscess versus hemorrhagic neoplasm. Felt less likely malignancy because of lack of ascites. Patient did have a protruding endometrial-endocervical polyp that could have seeded the upper genital tract. Patient did undergo on 8/7/2020 and endocervical polyp removal that has been sent for pathology. 8/8/2020 patient developed acute onset of abdominal pain to be sent to the OR for surgical exploration. Maxipime and Flagyl will be continued  3. Left lower lobe CAP noted on CT abdomen 8/7/2020 mild groundglass airspace opacities within the lingula suggest pneumonia. COVID-19 was negative. Cefepime will be continued. 4. Anemia, normocytic: Monitor and trend  5. Hyponatremia, mild:  Repeat labs pending  6. COPD:  History, quit smoking 2018, monitor  7. Incomplete RBBB:  Per EKG, monitor, no complaint of dyspnea or chest pain. 8. Impaired glucose tolerance: Monitor repeat labs are pending  9. GERD: History, monitor  10. Obesity: BMI 31.3    INITIAL H AND P AND ICU COURSE:  Zhane Barr is a 62year old  female transferred to [facility] ICU 8/8/2020 with hypotension and abdominal pain. Patient has a significant history of former smoker-quit 2019, COPD, impaired glucose tolerance, GERD    Chrystine Hem presented to [address] ER with complaint of abdominal pain, fever, vaginal bleeding.   Patient identifies onset vaginal bleeding with passage of clots, averaging 3-4 pads/day. Onset proximately 2 weeks ago. Patient also identifies a fever of 102.9 that started approximately 2 days ago with complaints of lower abdominal pain. She states she noted a mass coming out of her vagina and tried to pull it out unsuccessfully secondary to pain. 2020 transvaginal ultrasound noted large complex lesion in the left ovary could relate to abscess or hemorrhagic neoplasm. CT abdomen pelvis with IV contrast mild groundglass airspace opacities within the lingula suggest pneumonia. Mild fatty infiltration of liver, multiple gallstones biliary. A spiculated cystic mass within the left pelvis. Large pelvic mass which is mildly inflamed likely related to the left ovary. Evaluation occurred by OB/GYN Dr. Melony Schneider, an Endocervical polyp was removed and sent for pathology. Patient was sent to [facility] ICU stepdown. 2020 I was called to evaluate patient secondary to hypotension. Patient was not responsive to 2 L of 0.9 normal saline systolic blood pressure remained 68-74 with a heart rate of 120. Levophed drip was started and patient was admitted to the ICU for further evaluation and care. Past Medical History: See HPI. Family History: Mother -Ascent hypertension, asthma, father alive high blood pressure. Social History: Former smoker quit 2018 denies any alcohol or illicit drug use. Single    ROS   GENERAL: Positive for fever night sweats fatigue   SKN: No lesions or rashes.   HEAD: No headaches or recent injury  EYES: No acute changes in vision, no diplopia or blurred vision  EARS: No hearing loss, no tinnitus  NOSE/THROAT: No rhinorrhea or pharyngitis, no nasal drainage  NECK: No lumps or unusual neck stiffness  PULMONARY: No dyspnea, orthopnea or paroxysmal nocturnal dyspnea, stridor wheezing cough  CARDIAC: Positive for tachycardia no chest pain, pressure, heaviness or tightness  GI: Positive for abdominal pain, abdominal distention, decreased bowel sounds positive for guarding. No history melena or hematochezia, no diarrhea or constipation  PERIPHERAL VASCULAR: No intermittent claudication or unusual leg cramps  MUSCULOSKELETAL: Occasional arthralgias, myalgias  NEUROLOGICAL: Denies any headache near syncope Seizures or Syncope  HEMATOLOGIC: Positive for vaginal bleeding   PSYCH: Denies any homicidal or suicidal ideations    Scheduled Meds:   sodium chloride flush  10 mL Intravenous 2 times per day    enoxaparin  40 mg Subcutaneous Daily    cefepime  2 g Intravenous Q12H    metroNIDAZOLE  500 mg Intravenous Q8H     Continuous Infusions:   sodium chloride 150 mL/hr at 08/08/20 0155       PHYSICAL EXAMINATION:  T: 98.9.  P: 121. RR: 24. B/P: 98/62. FiO2: 2 L. O2 Sat: 96.  I/O: Pending  Body mass index is 30.53 kg/m². GCS:   15  General:   Pale diaphoretic acutely ill  HEENT:  normocephalic and atraumatic. No scleral icterus. PERR  Neck: supple. No Thyromegaly. Lungs: clear to auscultation, diminished in bases. No retractions  Cardiac: G6-T8-pdaibghhjas no JVD. Abdomen: Rounded, large, semi-firm-tender-generalized with guarding, hypoactive bowel sounds. Extremities:  No clubbing, cyanosis, or edema x 4. Vasculature: capillary refill < 3 seconds. Palpable dorsalis pedis pulses. Skin:  warm and dry. Psych:  Alert and oriented x3. Affect appropriate  Lymph:  No supraclavicular adenopathy. Neurologic:  No focal deficit. No seizures. Data: (All radiographs, tracings, PFTs, and imaging are personally viewed and interpreted unless otherwise noted).     3134 potassium 3.7 chlorides 9 9 CO2 23 BUNs 19 creatinine 0.9 glucose 124 procalcitonin [de-identified] alk phos is 97 ALT is 12 AST 14 total bili is 0.6 white count 16.5 hemoglobin 12.7 hematocrit is 40.9 platelet count 208   COVID-19, negative   8/7/2020 urine dark yellow trace blood 100 protein positive for nitrates 10-15 WBCs no bacteria   8/7/2020 CT abdomen pelvis-lingular pneumonia, cholelithiasis, large pelvic mass which is mildly inflamed likely relating to the left ovary.  8/7/2020 transvaginal ultrasound-large complex lesion in the left ovary could relate to abscess or hemorrhagic neoplasm   8/7/2020 EKG sinus tachycardia heart rate 129   incomplete right bundle branch block   Cardiac telemetry sinus tachycardia    This is a 77-year-old  female admitted on 8/7/2020 with complaints of abdominal pain per CT and ultrasound evaluation concern of a large complex lesion in the left ovary felt to be abscess versus neoplasm. Patient did develop acute hypotension with tachycardia that did improve after a Levophed drip was started. Patient still remained remains tachycardic with a heart rate of 118-122. Patient did develop acute abdominal pain with rare to absent bowel sounds. Labs were repeated and pending at this time. OB Dr. Bonnie Medina is planning to take patient to the OR on an urgent-emergent basis. Functional capacity moderate with complaint of dyspnea with 1 flight of stairs. METS>4  EKG sinus tachycardia with incomplete right bundle branch block. Patient does require Levophed for blood pressure support at this time. Likely secondary to septic shock. Consent was reviewed with patient per Dr. Bonnie Medina and is agreeable to proceed with surgery. Seen with multidisciplinary ICU team yes.   Meets Continued ICU Level Care Criteria:    [x] Yes   [] No - Transfer Planned to listed location:  [] HOSPITALIST CONTACTED- DR Jensen and plan discussed with Dr. Tg Ríos      Electronically signed by KYLE Barroso - [facility]  CRITICAL CARE SPECIALIST

## 2020-08-08 NOTE — PROGRESS NOTES
Pt admitted to ICU From 4K   Complaints: abdominal pain   IV saline at 999ml/hr in LFA, condition patent and no redness noted. IV site free of infection or infiltration. Vital signs obtained. Assessment and data collection initiated. Oriented to room and call light. Policies and procedures for ICU explained. All questions answered with no further questions at this time. Fall prevent and safety brouchre discussed with patient. Bed locked in lowest position, call light within reach. Skin assessment by this RN and Laurel Moss

## 2020-08-08 NOTE — PROGRESS NOTES
0779 Patient arrived to unit from surgery via bed. Patient transferred to ICU bed and placed on continuous ICU bedside monitor. Patient admitted for Ovarian mass [N83.8]  Ovarian mass [N83.8]. Vitals obtained. See flowsheets. Patient's IV access includes rij cvc and LFA . Current infusions and rates of infusion include NONE. Assessment completed by [facility]. Two nurse skin assessment completed by Rainer Singh and ele. See flowsheets for assessment details. Policies and procedures of ICU unable to be explained to patient at this time. Family member(s)/representative(s) present at time of admission include none. Patient rights explained to family member(s)/representatives and patient, as able. Patient/patient's family member(s)/representative(s) N/A to have physician notified of their admission. All questions posed by patient's family member(s)/representative(s) and patient answered at this time.

## 2020-08-08 NOTE — PLAN OF CARE
Problem: Impaired respiratory status  Goal: Normal spontaneous ventilation  Outcome: Ongoing   Pt set up on ventilator today, will monitor for readiness to wean

## 2020-08-08 NOTE — PROGRESS NOTES
Comprehensive Nutrition Assessment    Type and Reason for Visit:  Initial, Consult(TF ordering and management)    Nutrition Recommendations/Plan: If u/a to extubate but able to use gut - recommend TF Vital 1.2 - start at 10 ml/hr; increase by 10 ml every 6 hours, as tolerated, to goal rate of 45 ml/hr. If u/a to use gut and considering PN - suggest using dosing wt: 71 kgm, start with 10 kcals/kgm, 1.5 gm protein/kgm and 10% kcals from lipids. Nutrition Assessment:    Pt. nutritionally compromised AEB NPO status/inadequate oral intake d/t intubation, surgery 8/8. At risk for further nutrition compromise r/t altered GI function, admit with ovarian abscess and underlying medical condition (hx impaired glucose, chronic back pain). Nutrition recommendations/interventions as per above. Malnutrition Assessment:  Malnutrition Status: At risk for malnutrition (Comment)    Context:  Acute Illness     Findings of the 6 clinical characteristics of malnutrition:  Energy Intake:  Unable to assess  Weight Loss:  No significant weight loss     Body Fat Loss:  No significant body fat loss     Muscle Mass Loss:  No significant muscle mass loss    Fluid Accumulation:  Unable to assess     Strength:  Not Performed    Estimated Daily Nutrient Needs:  Energy (kcal):  4412-9805 kcals (15-18 kcals/kgm wt of 94 kgm); Weight Used for Energy Requirements:  Current     Protein (g):  128+ grams/day (2+ grams/kgm IBW); Weight Used for Protein Requirements:  Ideal        Fluid (ml/day):  per MD;     Nutrition Related Findings:  pt. seen - on vent; +NGT; diprovan at 11.2 ml/hr (providing 296 kcals from IV lipid emulsion);  Rx includes Insulin, ATB and Levophed; 7/9/20: HgbA1c 5.6%; MAP 72; admit with abdominal pain, fever - ovarian abscess      Wounds:  (abdominal incision- OR 8/8 - exp lap, oorphorectomy, SB exploration)       Current Nutrition Therapies:    Diet NPO Effective Now    Anthropometric Measures:  · Height: 5' 8\"

## 2020-08-08 NOTE — PROGRESS NOTES
36 son in to visit. updated.  IN SPOKE WITH SON. Questions answered. 1200 pt opens eyes to voice. Followed simple command x4.pt repositioned. 1410 pt placed on spontaneous breathing trial . resp rate up to 33 within few minutes returned to full vent support. 1500  back in updated on failed  breathing trial.informed of intake and output imbalance. 1530 notified Josie Moody of continued low urine output, orders received.

## 2020-08-08 NOTE — PLAN OF CARE
Problem: Nutrition  Goal: Optimal nutrition therapy  Outcome: Ongoing  Nutrition Problem #1: Inadequate oral intake  Intervention: Food and/or Nutrient Delivery: Continue NPO  Nutritional Goals: Pt. will receive adequate nutrition (FL diet vs nutrition support) in next 1-4 days.

## 2020-08-08 NOTE — PROGRESS NOTES
1 - Report received from [facility]. Bedside report completed, including patient. Patient turned and repositioned at this time and skin assessment completed/reviewed. Lines, tubes and IV drips reviewed with OLIVIER Angulo. Bed alarm in place and functioning properly. Call light remains within reach to patient. 1935 - Patient flipped to spontaneous breathing trial at this time. Patient alert and responsive to commands at time of trial. Patient able to nod head appropriately to questions. SpO2 remains at 92%. Respirations ranging between 18-22 breaths/minute. Continuing to monitor. 1950 - Assessment completed at this time. See flowsheets for assessment details. 2015 - Patient noted to be tachypneic and demonstrating increased work of breathing. Patient flipped back to pressure control ventilation by respiratory therapist. Maria G Swanson DNP notified. Okay with keeping patient on ventilator t/o this shift. Will attempt to wean in AM.     0000 - Assessment completed at this time. See flowsheets for assessment details. 0400 - Assessment completed at this time. See flowsheets for assessment details. 2255-3106 - Attempted to wean patient from ventilator at this time. Patient relaxed on spontaneous breathing trail for approximately 30 minutes but then became agitated, tachypneic and noted to have increased work of breathing again. Patient placed back on ventilator support and sedation resumed per Maria G Swanson DNP.     6661 - Reported off to [facility].

## 2020-08-08 NOTE — ANESTHESIA PRE PROCEDURE
Department of Anesthesiology  Preprocedure Note       Name:  Lev Orta   Age:  62 y.o.  :  1960                                          MRN:  [id number]         Date:  2020      Surgeon: Ivon Reece):  Zackary Gonzalez MD    Procedure: Procedure(s):  LAPAROTOMY EXPLORATORY    Medications prior to admission:   Prior to Admission medications    Medication Sig Start Date End Date Taking? Authorizing Provider   famotidine (PEPCID) 20 MG tablet Take 20 mg by mouth 2 times daily   Yes Historical Provider, MD   albuterol sulfate  (90 Base) MCG/ACT inhaler INHALE 2 PUFFS BY MOUTH INTO THE LUNGS EVERY 6 HOURS AS NEEDED FOR WHEEZING 20  Yes Angie Bahena MD   nicotine polacrilex (EQL NICOTINE) 4 MG lozenge Take 1 lozenge by mouth as needed for Smoking cessation 18  Yes Angie Bahena MD   Cyclobenzaprine HCl (FLEXERIL PO) Take  by mouth nightly. Yes Historical Provider, MD   oxyCODONE-acetaminophen (PERCOCET) 7.5-325 MG per tablet Take 1 tablet by mouth 2 times daily as needed. Yes Historical Provider, MD   ibuprofen (ADVIL;MOTRIN) 200 MG tablet Take 400 mg by mouth every 8 hours as needed.      Yes Historical Provider, MD   Pseudoephedrine-Guaifenesin ([address] PO) Take by mouth    Historical Provider, MD       Current medications:    Current Facility-Administered Medications   Medication Dose Route Frequency Provider Last Rate Last Dose    oxyCODONE-acetaminophen (PERCOCET) 5-325 MG per tablet 1 tablet  1 tablet Oral Q8H PRN Mohsin Reza, MD        norepinephrine (LEVOPHED) 16 mg in 250 mL infusion  2 mcg/min Intravenous Continuous Naz Haque APRN - CNP 5.6 mL/hr at 20 0617 6 mcg/min at 20 0617    fentaNYL (SUBLIMAZE) injection 25 mcg  25 mcg Intravenous Q5 Min PRN Angela Diaz MD        fentaNYL (SUBLIMAZE) injection 50 mcg  50 mcg Intravenous Q5 Min PRN Angela Diaz MD        HYDROmorphone (DILAUDID) injection 0.25 mg  0.25 mg Intravenous Q5 Min PRN Jerry Blanc MD        HYDROmorphone (DILAUDID) injection 0.5 mg  0.5 mg Intravenous Q5 Min PRN Jerry Blanc MD        diphenhydrAMINE (BENADRYL) injection 12.5 mg  12.5 mg Intravenous Once PRN Jerry Blanc MD        promethazine [facility]) injection 12.5 mg  12.5 mg Intramuscular Once PRN Jerry Blanc MD        metoclopramide (REGLAN) injection 10 mg  10 mg Intravenous Once PRN Jerry Blanc MD        labetalol (NORMODYNE;TRANDATE) injection syringe 5 mg  5 mg Intravenous Q10 Min PRN Jerry Blanc MD        meperidine (DEMEROL) injection 12.5 mg  12.5 mg Intravenous Q5 Min PRN Jerry Blanc MD        sodium chloride flush 0.9 % injection 10 mL  10 mL Intravenous 2 times per day Isrrael Osier, DO        sodium chloride flush 0.9 % injection 10 mL  10 mL Intravenous PRN Isrrael Osier, DO        acetaminophen (TYLENOL) tablet 650 mg  650 mg Oral Q6H PRN Isrrael Osier, DO   650 mg at 08/08/20 0100    Or    acetaminophen (TYLENOL) suppository 650 mg  650 mg Rectal Q6H PRN Isrrael Osier, DO        polyethylene glycol (GLYCOLAX) packet 17 g  17 g Oral Daily PRN Isrrael Osier, DO        promethazine (PHENERGAN) tablet 12.5 mg  12.5 mg Oral Q6H PRN Isrrael Osier, DO        Or    ondansetron [facility] injection 4 mg  4 mg Intravenous Q6H PRN Isrrael Osier, DO   4 mg at 08/08/20 0503    enoxaparin (LOVENOX) injection 40 mg  40 mg Subcutaneous Daily Isrrael Osier, DO   Stopped at 08/07/20 1942    cefepime (MAXIPIME) 2 g IVPB minibag  2 g Intravenous Q12H Isrrael Osier, DO   Stopped at 08/08/20 0156    metronidazole (FLAGYL) 500 mg in NaCl 100 mL IVPB premix  500 mg Intravenous Q8H Isrrael Osier, DO   Stopped at 08/08/20 0207    oxyCODONE-acetaminophen (PERCOCET) 7.5-325 MG per tablet 1 tablet  1 tablet Oral BID PRN Isrrael Otoole, DO   1 tablet at 08/08/20 0122    0.9 % sodium chloride infusion   Intravenous Continuous Isrrael OsDO emigdio 150 mL/hr at 08/08/20 4443 Facility-Administered Medications Ordered in Other Encounters   Medication Dose Route Frequency Provider Last Rate Last Dose    fentaNYL (SUBLIMAZE) injection    PRN Yolanda Daniel MD   100 mcg at 08/08/20 0701    lidocaine injection    PRN Yolanda Daniel MD   100 mg at 08/08/20 0701    propofol injection    PRN Yolanda Daniel MD   100 mg at 08/08/20 0701    succinylcholine (ANECTINE) injection    PRN Yolanda Daniel MD   120 mg at 08/08/20 0701    rocuronium (ZEMURON) injection    PRN Irwin Backbone, APRN - CRNA   50 mg at 08/08/20 0719       Allergies:     Allergies   Allergen Reactions    Latex Shortness Of Breath     And hives    Flu Virus Vaccine     Fluzone [Influenza Vac Split Quad] Other (See Comments)     Letter in media     Sulfa Antibiotics Other (See Comments)     Eye ulcers from eye drops    Lead (Elemental) Rash       Problem List:    Patient Active Problem List   Diagnosis Code    Chronic pain syndrome G89.4    Lumbar disc disease, herniated L5-S1 M51.9    Chronic gastritis K29.50    Cephalgia, intermittant R51    Allergic rhinitis J30.9    Lumbar post-laminectomy syndrome M96.1    Medication monitoring encounter Z51.81    IFG (impaired fasting glucose) R73.01    Chronic cough, family hx - COPD R05    Panlobular emphysema ([address] J43.1    Ovarian mass N83.8    Septicemia ([address] A41.9       Past Medical History:        Diagnosis Date    Allergic rhinitis     Chronic back pain     Headache(784.0)     Panlobular emphysema ([address] 3/15/2018       Past Surgical History:        Procedure Laterality Date    BACK SURGERY      COLONOSCOPY Left 8/2/2019    COLONOSCOPY POLYPECTOMY SNARE/COLD BIOPSY performed by Meaghan Muñoz MD at [facility]   [address] [place] [address]       Social History:    Social History     Tobacco Use    Smoking status: Former Smoker     Packs/day: 0.50     Years: 40.00     Pack years: 20.00     Types: Cigarettes     Start date: 1978     Last attempt to quit: 2018     Years since quittin.3    Smokeless tobacco: Never Used   Substance Use Topics    Alcohol use: No     Alcohol/week: 0.0 standard drinks                                Counseling given: Not Answered      Vital Signs (Current):   Vitals:    20 0552 20 0602 20 0616 20 0632   BP: 89/77 98/62 111/82 (!) 9955   Pulse: 121 121 122 122   Resp:    Temp:       TempSrc:       SpO2: 96% 96% 95% 93%   Weight:       Height:                                                  BP Readings from Last 3 Encounters:   20 (!) 99/55   20 (!) 109/58   20 138/70       NPO Status:                                                                                 BMI:   Wt Readings from Last 3 Encounters:   20 206 lb 2.1 oz (93.5 kg)   20 205 lb 14.4 oz (93.4 kg)   19 209 lb 12.8 oz (95.2 kg)     Body mass index is 31.34 kg/m². CBC:   Lab Results   Component Value Date    WBC 7.7 2020    RBC 3.80 2020    HGB 11.3 2020    HCT 37.1 2020    MCV 97.6 2020    RDW 13.2 2018     2020       CMP:   Lab Results   Component Value Date     2020    K 3.5 2020     2020    CO2 18 2020    BUN 21 2020    CREATININE 0.8 2020    LABGLOM 73 2020    GLUCOSE 98 2020    PROT 6.7 2020    CALCIUM 7.2 2020    BILITOT 0.6 2020    ALKPHOS 97 2020    AST 14 2020    ALT 12 2020       POC Tests: No results for input(s): POCGLU, POCNA, POCK, POCCL, POCBUN, POCHEMO, POCHCT in the last 72 hours.     Coags: No results found for: PROTIME, INR, APTT    HCG (If Applicable): No results found for: PREGTESTUR, PREGSERUM, HCG, HCGQUANT     ABGs: No results found for: PHART, PO2ART, ZCO5IFT, KQY6MRZ, BEART, R3WVMJBK     Type & Screen (If Applicable):  No results found for: LABABO, LABRH    Drug/Infectious Status (If Applicable):  No results found for: HIV, HEPCAB    COVID-19 Screening (If Applicable):   Lab Results   Component Value Date    COVID19 NOT DETECTED 08/07/2020         Anesthesia Evaluation  Patient summary reviewed  Airway: Mallampati: II  TM distance: >3 FB   Neck ROM: full  Mouth opening: > = 3 FB Dental: normal exam         Pulmonary:normal exam    (+) COPD:                             Cardiovascular:                      Neuro/Psych:   (+) headaches:,             GI/Hepatic/Renal:             Endo/Other:                     Abdominal:   (+) obese,         Vascular:                                        Anesthesia Plan      general     ASA 3 - emergent       Induction: intravenous and rapid sequence. MIPS: Postoperative opioids intended, Prophylactic antiemetics administered, Postoperative trial extubation and Postoperative ventilation. Anesthetic plan and risks discussed with patient. Use of blood products discussed with patient whom consented to blood products.    Plan discussed with CRNA and surgical team.                  Leatha Woodson MD   8/8/2020

## 2020-08-08 NOTE — CONSULTS
Department of Gynecology  Consult Note      Reason for Consult:  Left ov mass, mass protruding from cervix, abd pain   Requesting Physician:  Dr Denise Peters:   abd pain and vaginal bleeding with fever    History obtained from patient, electronic medical record    HISTORY OF PRESENT ILLNESS:     The patient is a 62 y.o. female with significant past medical history of gastritis, miley pain and emphysema who presents with fever since 8/4 and abd pain. Pt initially started bleeding ~2 weeks ago and it became heavy. She noted a mass coming out of her vagina and tried to pulll it out unsuccessfully secondary to pain. Her fever worsened and she presents today with fever to 102. Past Medical History:        Diagnosis Date    Allergic rhinitis     Chronic back pain     Headache(784.0)     Panlobular emphysema ([address] 3/15/2018     Past Surgical History:        Procedure Laterality Date    BACK SURGERY      COLONOSCOPY Left 8/2/2019    COLONOSCOPY POLYPECTOMY SNARE/COLD BIOPSY [facility] [place]       Past Gynecological History:    1. Last menstrual period: In her late 35s per the patient    4. Sexually transmitted infections  none        A.  Number of sexual partners in the last 6 months: 0    meds:  Current Facility-Administered Medications:     sodium chloride flush 0.9 % injection 10 mL, 10 mL, Intravenous, 2 times per day, Adry Ruiz DO    sodium chloride flush 0.9 % injection 10 mL, 10 mL, Intravenous, PRN, Adry Ruiz DO    acetaminophen (TYLENOL) tablet 650 mg, 650 mg, Oral, Q6H PRN **OR** acetaminophen (TYLENOL) suppository 650 mg, 650 mg, Rectal, Q6H PRN, Adry Ruiz DO    polyethylene glycol (GLYCOLAX) packet 17 g, 17 g, Oral, Daily PRN, Adry Ruiz DO    promethazine (PHENERGAN) tablet 12.5 mg, 12.5 mg, Oral, Q6H PRN **OR** ondansetron (ZOFRAN) injection 4 mg, 4 mg, absent  Urethral Meatus:  Size normal, Location normal, Lesions absent, Prolapse absent  Urethra: Fullness absent, Masses absent  Bladder:  Fullness absent, Masses absent, Tenderness absent, Cystocele absent  Vagina:  General appearance normal, Estrogen effect normal, Discharge absent, Lesions absent, Pelvic support normal  Cervix:  Abnormal findings: purple 2x3cm endocervical polyp protruding from the endocervix  Uterus:  Abnormal findings: tender, nl size  Adenexa:  Abnormal findings: tender bilat l>R fullness on the left      NEUROLOGIC:  Awake, alert, oriented to name, place and time. Cranial nerves II-XII are grossly intact. DATA:  Recent Labs     08/07/20  1054   WBC 16.5*   HGB 12.7   HCT 40.9        Recent Labs     08/07/20  1054   *   K 3.7   CL 99   CO2 23   BUN 19   CREATININE 0.9   CALCIUM 8.5   AST 14   ALT 12       Labs:    CBC:   Lab Results   Component Value Date    WBC 16.5 08/07/2020    RBC 4.26 08/07/2020    HGB 12.7 08/07/2020    HCT 40.9 08/07/2020    MCV 96.0 08/07/2020    RDW 13.2 03/05/2018     08/07/2020     CMP:    Lab Results   Component Value Date     08/07/2020    K 3.7 08/07/2020    CL 99 08/07/2020    CO2 23 08/07/2020    BUN 19 08/07/2020    PROT 6.7 08/07/2020     U/A:  No components found for: Lavetta Sneddon, USPGRAV, UPH, UPROTEIN, UGLUCOSE, UKETONE, UBILI, UBLOOD, UNITRITE, UUROBIL, ULEUKEST, USQEPI, [place], UWBC, [place], [place], [place]     Radiologic studies results:  XR CHEST 1 VIEW   Final Result   No confluent infiltrate. PA and lateral radiographs could be performed if indicated clinically.               **This report has been created using voice recognition software. It may contain minor errors which are inherent in voice recognition technology. **       Final report electronically signed by Dr. Carlos Madrigal on 8/7/2020 1:30 PM       US NON OB TRANSVAGINAL   Final Result       Large complex lesion in the left ovary could relate to abscess or hemorrhagic neoplasm either benign or malignant. Lack of significant ascites mitigates against malignancy.                   **This report has been created using voice recognition software. It may contain minor errors which are inherent in voice recognition technology. **       Final report electronically signed by Dr. Fortino Ross on 2020 1:44 PM       US DUP ABD PEL RETRO SCROT COMPLETE   Final Result       Large complex lesion in the left ovary could relate to abscess or hemorrhagic neoplasm either benign or malignant. Lack of significant ascites mitigates against malignancy.                   **This report has been created using voice recognition software. It may contain minor errors which are inherent in voice recognition technology. **       Final report electronically signed by Dr. Fortino Ross on 2020 1:44 PM       CT ABDOMEN PELVIS W IV CONTRAST Additional Contrast? None   Final Result   Lingular pneumonia.       Cholelithiasis.       Large pelvic mass which is mildly inflamed likely relating to the left ovary. Benign and malignant etiologies are possible.                   **This report has been created using voice recognition software. It may contain minor errors which are inherent in voice recognition technology. **       Final report electronically signed by Dr. Fortino Ross on 2020 1:         IMPRESSION/RECOMMENDATIONS:  63 yo  menopausal woman with   1. Infectious process. Most likely cause secondary to left adnexal abscess. Cause of abscess unknown except that she has had this protruding endometrial/endiocervical polyp that could have seeded the upper genital tract. WBC 16 and Pro calcitonin 1.7 cultures pending Agree with abx choice to cover anaerobes and broad spectrum. She may very well require drainage either via radiology or laparoscopy. Recommend antibiotics initially for response. 2. Endocervical polyp. I removed this in the ED and sent it for pathology.  I expect she will have mild vaginal bleeding. This will need further evaluated. I     I will continue to follow with you. I am on call all weekend and can be reached thru perfect serve.  carole dias for allowing me to participate in Ms Givens's care.       Active Problems:    Ovarian mass

## 2020-08-08 NOTE — OP NOTE
Operative Note      NAME: Angelika Wills   MRN: [id number]  : 1960  PROCEDURE DATE: 2020 - 2020     Surgeon: Gladys Guaman) and Role: * Natalio Williamson MD - Primary     * Manuel Bset MD - Assisting    Assistants: none    Staff: Circulator: Carlos Govea RN  Surgical Assistant: Tequila Cano Circulator: Sarah Jones RN  Scrub Person First: Lauryn Martins; Fletcher Hinton  Scrub Person Second: Odalys Walter    Pre-op Diagnosis: sepsis secondary to intraabdominal mass    Post-op Diagnosis: same     Procedure Performed: Procedure(s):  LAPAROTOMY EXPLORATORY, LEFT SALPINGO OOPHORECTOMY, SMALL BOWEL EXPLORATION (N/A)    Anesthesia Type: General    Indications: Is a 17-year-old female who gynecology was following for sepsis and CT with large pelvic mass. Patient clinically decline was taken the operating room for emergent surgery. Dr. Jacobo Yancey called for intraoperative consult to help with delineation of anatomy due to the large cystic that was clinically difficult to determine if was coming from ovarian versus colonic origin. Findings:  -10 cm tubo-ovarian abscess that was extending and adherent to the adjacent sigmoid colon mesentery  -Pus throughout the abdomen present at time of surgery extending up under the greater omentum, interloop abscesses as well as up over the liver. Procedure details: At time of consultation patient was being explored by Dr. Ester Hall. She had already irrigated out significant bout of pus from the patient's abdomen that was present at time of surgery. She had exposed a approximately 10 cm fluid collection that was cystic in appearance. It was difficult to ascertain due to its location whether it was coming from deep in the pelvis from ovarian origin or if it was coming from the sigmoid colon as it was adherent to both. Intraoperative consult was requested to be available and assist dissection and ensure that there was no colonic involvement. The pelvic cyst was able to be dissected from the pelvic sidewall using blunt dissection and cautery. It was freed from the lateral adhesions to the sigmoid colon, it appeared to be free from the colon but was not adjacent to the colonic mesentery. It was able to be completely dissected out. Dissection was tedious due to significant friable tissues and obliteration of surgical planes. The cystic cavity was entered and dissection. Was identified to be coming from the ovary. Dr. Joby Haro then performed a left-sided salpingo-oophorectomy which can be seen a separate dictation. Her abdomen was then explored, the greater omentum was elevated there were multiple intraloop abscesses that were irrigated and suctioned until clear. The abscess cavity extended up over the liver, this was also suctioned and irrigated away. Irricept solution was instilled for two minutes before being suctioned away. At this point I scrubbed out Dr. Janusz Mcqueen came completed the operation. Please see her dictation for further details.   Complications: none    Specimens:   ID Type Source Tests Collected by Time Destination   1 : Intraperitoneal fluid Body Fluid Abdomen BODY FLUID CELL COUNT, CULTURE, BODY FLUID Paco Ramirez MD 8/8/2020 4857    2 : urine reflex to culture Body Fluid Urethra URINE RT REFLEX TO CULTURE (Canceled) Paco Ramirez MD 8/8/2020 6583    3 : peritoneal a/a cultures Swab RETROPERITONEAL CULTURE, ANAEROBIC AND AEROBIC Paco Ramirez MD 8/8/2020 9823    A : right ovary Tissue Ovary SURGICAL PATHOLOGY Paco Ramirez MD 8/8/2020 9063         Implant:  * No implants in log *       Estimated Blood Loss:  200 ml                     Condition: stable

## 2020-08-08 NOTE — PROCEDURES
Critical care of [facility]    Patient:  Jimmy Conte  YOB: 1960    MRN: [id number]   Acct:  [de-identified]      8/8/20  0500    PERFORMED BY: Jose Zaman    PRE PROCEDURE DIAGNOSES: Hypotension likely Septic Shock    INDICATIONS:vascular access, central venous monitoring, centrally administered medications and need for frequent blood draws. SITE: right  internal jugular vein    CONSENT:  Consent obtained from patient and/or next of kin after explaining indication/risks    TIME OUT: Completed. Hand washing completed prior to procedure. STERILE PREP: Full maximum sterile field/barrier technique was followed (with cap and mask, gloves and sterile gown, as well as broad field sterile drapes). Local disinfection was performed with broad field application of orange dyed chloraprep solution, which was allowed to dry fully prior to the initiation of the procedure. LOCAL ANESTHETIC: Numbed with 1 percent lidocaine, total estimated 5 ml. ESTIMATED BLOOD LOSS:  3 ml. PROCEDURE:  internal jugular vein  easily accessed with a 23-gauge needle and with an 18-gauge needle. Guidewire was passed without difficulty or resistance. Both needles removed, dilator over guidewire with skin snip, dilator removed. 3 lumen 16 CVC catheter advanced over the guidewire without difficulty or resistance to full extent. The guidewire was withdrawn and discarded and good return of dark venous blood. Positive blood aspiration from all lumens and flushed easily. Sutured at 15 cm, Biopatch applied. Chest x-ray is ordered. Chest x-ray confirms position, line in SVC and ready for use. Pneumothorax No, other complications No.      Procedure well tolerated. Critical Care Time: Less than 30 minutes. Plan:  1.  Levophed gtt required to maintain MAP>65            Electronically signed by KYLE Gutierrez CNP on 8/8/2020 at 5:43 AM

## 2020-08-08 NOTE — PROGRESS NOTES
0500 - Pt admitted from . Assessment completed. 0510 Lele Friend CNP at bedside placing CVC. Consent signed. Time out completed. 0530 - Pt tolerated CVC placement well. 3677 - Pt taken to OR with OR nurses.

## 2020-08-08 NOTE — BRIEF OP NOTE
GYN Brief Postoperative Note  ______________________________________________________________    Patient: Erica Benitez  YOB: 1960  MRN: [id number]  Date of Procedure: 8/8/2020    Pre-Op Diagnosis: ovarian abscess    Post-Op Diagnosis: Same       Procedure(s):  LAPAROTOMY EXPLORATORY, LEFT SALPINGO OOPHORECTOMY, SMALL BOWEL EXPLORATION, irrigation of purulent material    Anesthesia: Anesthesia type not filed in the log. Surgeon(s):  John Kraft, MD Corrinne Alder, MD    Staff:  Surgical Assistant: Rufus Linton Person First: Nataliia Francisco; Frankey Argyle  Scrub Person Second: Mercy Granados     Estimated Blood Loss: 200 (units unknown) mL    Complications: None    Specimens:   ID Type Source Tests Collected by Time Destination   1 : Intraperitoneal fluid Body Fluid Abdomen BODY FLUID CELL COUNT, CULTURE, BODY FLUID Colin Goldberg MD 8/8/2020 6574    2 : urine reflex to culture Body Fluid Urethra URINE RT REFLEX TO CULTURE (Canceled) Colin Goldberg MD 8/8/2020 7650    3 : peritoneal a/a cultures Swab RETROPERITONEAL CULTURE, ANAEROBIC AND AEROBIC Colin Goldberg MD 8/8/2020 1155    A : [facility] MD 8/8/2020 7852        Implants:  * No implants in log *      Drains:   Urethral Catheter Temperature probe 16 fr (Active)       Findings: large ~10 cm TOA. Left ovarian cyst filled with watery purulent fluid. Terrell pus upon opening the abd.  Purulent fluid up to the liver    Colin Goldberg MD  Date: 8/8/2020  Time: 9:10 AM

## 2020-08-08 NOTE — PROGRESS NOTES
Preop: Temp 97.9  Earings, 2 rings, and necklace with 2 hears removed by patient while on 4D and placed in labeled specimen cup and handed to ICU RN.   Bilateral nares swabbed per protocol

## 2020-08-09 LAB
ACINETOBACTER CALCOACETICUS-BAUMANNII BY PCR: NOT DETECTED
ADENOVIRUS BY PCR: NOT DETECTED
ALBUMIN SERPL-MCNC: 2.1 G/DL (ref 3.5–5.1)
ALP BLD-CCNC: 53 U/L (ref 38–126)
ALT SERPL-CCNC: 9 U/L (ref 11–66)
ANION GAP SERPL CALCULATED.3IONS-SCNC: 10 MEQ/L (ref 8–16)
ANION GAP SERPL CALCULATED.3IONS-SCNC: 7 MEQ/L (ref 8–16)
ANION GAP SERPL CALCULATED.3IONS-SCNC: 8 MEQ/L (ref 8–16)
AST SERPL-CCNC: 14 U/L (ref 5–40)
BASOPHILS # BLD: 0.7 %
BASOPHILS ABSOLUTE: 0.1 THOU/MM3 (ref 0–0.1)
BILIRUB SERPL-MCNC: 0.4 MG/DL (ref 0.3–1.2)
BODY FLUID RBC: NORMAL /CUMM
BUN BLDV-MCNC: 24 MG/DL (ref 7–22)
BUN BLDV-MCNC: 24 MG/DL (ref 7–22)
BUN BLDV-MCNC: 25 MG/DL (ref 7–22)
CALCIUM IONIZED: 1.05 MMOL/L (ref 1.12–1.32)
CALCIUM IONIZED: 1.07 MMOL/L (ref 1.12–1.32)
CALCIUM SERPL-MCNC: 7.3 MG/DL (ref 8.5–10.5)
CALCIUM SERPL-MCNC: 7.4 MG/DL (ref 8.5–10.5)
CALCIUM SERPL-MCNC: 7.5 MG/DL (ref 8.5–10.5)
CHARACTER, BODY FLUID: NORMAL
CHLAMYDIA PNEUMONIAE BY PCR: NOT DETECTED
CHLORIDE BLD-SCNC: 107 MEQ/L (ref 98–111)
CHLORIDE BLD-SCNC: 107 MEQ/L (ref 98–111)
CHLORIDE BLD-SCNC: 108 MEQ/L (ref 98–111)
CO2: 19 MEQ/L (ref 23–33)
CO2: 22 MEQ/L (ref 23–33)
CO2: 25 MEQ/L (ref 23–33)
COLOR: NORMAL
CORONAVIRUS PCR: NOT DETECTED
CREAT SERPL-MCNC: 0.7 MG/DL (ref 0.4–1.2)
ENTEROBACTER CLOACAE COMPLEX BY PCR: NOT DETECTED
EOSINOPHIL # BLD: 0.1 %
EOSINOPHILS ABSOLUTE: 0 THOU/MM3 (ref 0–0.4)
ERYTHROCYTE [DISTWIDTH] IN BLOOD BY AUTOMATED COUNT: 13.2 % (ref 11.5–14.5)
ERYTHROCYTE [DISTWIDTH] IN BLOOD BY AUTOMATED COUNT: 47.2 FL (ref 35–45)
ESCHERICHIA COLI BY PCR: NOT DETECTED
GFR SERPL CREATININE-BSD FRML MDRD: 85 ML/MIN/1.73M2
GLUCOSE BLD-MCNC: 102 MG/DL (ref 70–108)
GLUCOSE BLD-MCNC: 104 MG/DL (ref 70–108)
GLUCOSE BLD-MCNC: 148 MG/DL (ref 70–108)
GLUCOSE BLD-MCNC: 155 MG/DL (ref 70–108)
GLUCOSE BLD-MCNC: 160 MG/DL (ref 70–108)
GLUCOSE BLD-MCNC: 171 MG/DL (ref 70–108)
GLUCOSE BLD-MCNC: 94 MG/DL (ref 70–108)
HAEMOPHILUS INFLUENZAE BY PCR: NOT DETECTED
HCT VFR BLD CALC: 22.8 % (ref 37–47)
HCT VFR BLD CALC: 31.3 % (ref 37–47)
HCT VFR BLD CALC: 31.9 % (ref 37–47)
HEMOGLOBIN: 7 GM/DL (ref 12–16)
HEMOGLOBIN: 9.5 GM/DL (ref 12–16)
HEMOGLOBIN: 9.9 GM/DL (ref 12–16)
IMMATURE GRANS (ABS): 0.34 THOU/MM3 (ref 0–0.07)
IMMATURE GRANULOCYTES: 4.6 %
INFLUENZA A BY PCR: NOT DETECTED
INFLUENZA B BY PCR: NOT DETECTED
KLEBSIELLA AEROGENES BY PCR: NOT DETECTED
KLEBSIELLA OXYTOCA BY PCR: NOT DETECTED
KLEBSIELLA PNEUMONIAE GROUP BY PCR: NOT DETECTED
LEGIONELLA PNEUMOPHILIA BY PCR: NOT DETECTED
LYMPHOCYTES # BLD: 7.3 %
LYMPHOCYTES ABSOLUTE: 0.5 THOU/MM3 (ref 1–4.8)
MAGNESIUM: 1.6 MG/DL (ref 1.6–2.4)
MCH RBC QN AUTO: 30.7 PG (ref 26–33)
MCHC RBC AUTO-ENTMCNC: 31 GM/DL (ref 32.2–35.5)
MCV RBC AUTO: 98.8 FL (ref 81–99)
METAPNEUMOVIRUS BY PCR: NOT DETECTED
MONOCYTES # BLD: 6.7 %
MONOCYTES ABSOLUTE: 0.5 THOU/MM3 (ref 0.4–1.3)
MONONUCLEAR CELLS BODY FLUID: 32.7 %
MORAXELLA CATARRHALIS BY PCR: NOT DETECTED
MRSA SCREEN: NORMAL
MYCOPLASMA PNEUMONIAE BY PCR: NOT DETECTED
NUCLEATED RED BLOOD CELLS: 0 /100 WBC
PARAINFLUENZA VIRUS BY PCR: NOT DETECTED
PATHOLOGIST REVIEW: NORMAL
PHOSPHORUS: 1.9 MG/DL (ref 2.4–4.7)
PLATELET # BLD: 225 THOU/MM3 (ref 130–400)
PLATELET ESTIMATE: ADEQUATE
PMV BLD AUTO: 10.5 FL (ref 9.4–12.4)
POLYMORPHONUCLEAR CELLS BODY FLUID: 67.3 %
POTASSIUM SERPL-SCNC: 3.3 MEQ/L (ref 3.5–5.2)
POTASSIUM SERPL-SCNC: 3.5 MEQ/L (ref 3.5–5.2)
POTASSIUM SERPL-SCNC: 3.7 MEQ/L (ref 3.5–5.2)
POTASSIUM SERPL-SCNC: 3.8 MEQ/L (ref 3.5–5.2)
PROCALCITONIN: 17.86 NG/ML (ref 0.01–0.09)
PROTEUS SPECIES BY PCR: NOT DETECTED
PSEUDOMONAS AERUGINOSA BY PCR: DETECTED
RBC # BLD: 3.23 MILL/MM3 (ref 4.2–5.4)
RESISTANT GENE CTX-M BY PCR: NOT DETECTED
RESISTANT GENE IMP BY PCR: NOT DETECTED
RESISTANT GENE KPC BY PCR: NOT DETECTED
RESISTANT GENE MECA/C & MREJ BY PCR: ABNORMAL
RESISTANT GENE NDM BY PCR: NOT DETECTED
RESISTANT GENE OXA-48-LIKE BY PCR: ABNORMAL
RESISTANT GENE VIM BY PCR: NOT DETECTED
RESPIRATORY SYNCYTIAL VIRUS BY PCR: NOT DETECTED
RHINOVIRUS ENTEROVIRUS PCR: NOT DETECTED
SCAN OF BLOOD SMEAR: NORMAL
SEG NEUTROPHILS: 80.6 %
SEGMENTED NEUTROPHILS ABSOLUTE COUNT: 6 THOU/MM3 (ref 1.8–7.7)
SERRATIA MARCESCENS BY PCR: NOT DETECTED
SODIUM BLD-SCNC: 136 MEQ/L (ref 135–145)
SODIUM BLD-SCNC: 137 MEQ/L (ref 135–145)
SODIUM BLD-SCNC: 140 MEQ/L (ref 135–145)
SOURCE: ABNORMAL
SPECIMEN ACCEPTABILITY: ABNORMAL
SPECIMEN: NORMAL
STAPH AUREUS BY PCR: NOT DETECTED
STREP AGALACTIAE BY PCR: NOT DETECTED
STREP PNEUMONIAE BY PCR: NOT DETECTED
STREP PYOGENES BY PCR: NOT DETECTED
TOTAL NUCLEATED CELLS BODY FLUID: 160 /CUMM (ref 0–500)
TOTAL PROTEIN: 4.8 G/DL (ref 6.1–8)
TOTAL VOLUME RECEIVED BODY FLUID: 20 ML
URINE CULTURE REFLEX: NORMAL
WBC # BLD: 7.4 THOU/MM3 (ref 4.8–10.8)

## 2020-08-09 PROCEDURE — 2500000003 HC RX 250 WO HCPCS: Performed by: NURSE PRACTITIONER

## 2020-08-09 PROCEDURE — 2500000003 HC RX 250 WO HCPCS: Performed by: OBSTETRICS & GYNECOLOGY

## 2020-08-09 PROCEDURE — 82330 ASSAY OF CALCIUM: CPT

## 2020-08-09 PROCEDURE — 82948 REAGENT STRIP/BLOOD GLUCOSE: CPT

## 2020-08-09 PROCEDURE — 85014 HEMATOCRIT: CPT

## 2020-08-09 PROCEDURE — 2700000000 HC OXYGEN THERAPY PER DAY

## 2020-08-09 PROCEDURE — 6360000002 HC RX W HCPCS: Performed by: NURSE PRACTITIONER

## 2020-08-09 PROCEDURE — 80053 COMPREHEN METABOLIC PANEL: CPT

## 2020-08-09 PROCEDURE — APPSS180 APP SPLIT SHARED TIME > 60 MINUTES: Performed by: NURSE PRACTITIONER

## 2020-08-09 PROCEDURE — 84132 ASSAY OF SERUM POTASSIUM: CPT

## 2020-08-09 PROCEDURE — 94770 HC ETCO2 MONITOR DAILY: CPT

## 2020-08-09 PROCEDURE — 6360000002 HC RX W HCPCS: Performed by: OBSTETRICS & GYNECOLOGY

## 2020-08-09 PROCEDURE — 85025 COMPLETE CBC W/AUTO DIFF WBC: CPT

## 2020-08-09 PROCEDURE — 94003 VENT MGMT INPAT SUBQ DAY: CPT

## 2020-08-09 PROCEDURE — 2580000003 HC RX 258: Performed by: NURSE PRACTITIONER

## 2020-08-09 PROCEDURE — 84100 ASSAY OF PHOSPHORUS: CPT

## 2020-08-09 PROCEDURE — 6360000002 HC RX W HCPCS: Performed by: INTERNAL MEDICINE

## 2020-08-09 PROCEDURE — 2000000000 HC ICU R&B

## 2020-08-09 PROCEDURE — 2500000003 HC RX 250 WO HCPCS: Performed by: INTERNAL MEDICINE

## 2020-08-09 PROCEDURE — 2580000003 HC RX 258: Performed by: INTERNAL MEDICINE

## 2020-08-09 PROCEDURE — 6370000000 HC RX 637 (ALT 250 FOR IP): Performed by: NURSE PRACTITIONER

## 2020-08-09 PROCEDURE — 36415 COLL VENOUS BLD VENIPUNCTURE: CPT

## 2020-08-09 PROCEDURE — 99024 POSTOP FOLLOW-UP VISIT: CPT | Performed by: SURGERY

## 2020-08-09 PROCEDURE — 86304 IMMUNOASSAY TUMOR CA 125: CPT

## 2020-08-09 PROCEDURE — 84145 PROCALCITONIN (PCT): CPT

## 2020-08-09 PROCEDURE — 83735 ASSAY OF MAGNESIUM: CPT

## 2020-08-09 PROCEDURE — 99291 CRITICAL CARE FIRST HOUR: CPT | Performed by: INTERNAL MEDICINE

## 2020-08-09 PROCEDURE — 94761 N-INVAS EAR/PLS OXIMETRY MLT: CPT

## 2020-08-09 PROCEDURE — 2580000003 HC RX 258: Performed by: OBSTETRICS & GYNECOLOGY

## 2020-08-09 PROCEDURE — 85018 HEMOGLOBIN: CPT

## 2020-08-09 PROCEDURE — 6370000000 HC RX 637 (ALT 250 FOR IP): Performed by: OBSTETRICS & GYNECOLOGY

## 2020-08-09 RX ORDER — DEXMEDETOMIDINE HYDROCHLORIDE 4 UG/ML
0.2 INJECTION, SOLUTION INTRAVENOUS CONTINUOUS
Status: DISCONTINUED | OUTPATIENT
Start: 2020-08-09 | End: 2020-08-09 | Stop reason: SDUPTHER

## 2020-08-09 RX ORDER — DEXMEDETOMIDINE HYDROCHLORIDE 4 UG/ML
0.2 INJECTION, SOLUTION INTRAVENOUS CONTINUOUS
Status: DISCONTINUED | OUTPATIENT
Start: 2020-08-09 | End: 2020-08-09 | Stop reason: CLARIF

## 2020-08-09 RX ADMIN — FAMOTIDINE 20 MG: 10 INJECTION, SOLUTION INTRAVENOUS at 08:58

## 2020-08-09 RX ADMIN — Medication 50 MCG/HR: at 08:58

## 2020-08-09 RX ADMIN — SODIUM BICARBONATE: 84 INJECTION, SOLUTION INTRAVENOUS at 01:58

## 2020-08-09 RX ADMIN — DEXMEDETOMIDINE HYDROCHLORIDE 0.5 MCG/KG/HR: 100 INJECTION, SOLUTION, CONCENTRATE INTRAVENOUS at 10:23

## 2020-08-09 RX ADMIN — Medication 5 MCG/MIN: at 10:58

## 2020-08-09 RX ADMIN — ENOXAPARIN SODIUM 40 MG: 40 INJECTION SUBCUTANEOUS at 20:50

## 2020-08-09 RX ADMIN — INSULIN LISPRO 1 UNITS: 100 INJECTION, SOLUTION INTRAVENOUS; SUBCUTANEOUS at 11:53

## 2020-08-09 RX ADMIN — INSULIN LISPRO 1 UNITS: 100 INJECTION, SOLUTION INTRAVENOUS; SUBCUTANEOUS at 06:43

## 2020-08-09 RX ADMIN — METRONIDAZOLE 500 MG: 500 INJECTION, SOLUTION INTRAVENOUS at 08:24

## 2020-08-09 RX ADMIN — ACETAMINOPHEN 650 MG: 650 SUPPOSITORY RECTAL at 20:50

## 2020-08-09 RX ADMIN — CEFEPIME 2 G: 2 INJECTION, POWDER, FOR SOLUTION INTRAMUSCULAR; INTRAVENOUS at 12:45

## 2020-08-09 RX ADMIN — POTASSIUM CHLORIDE 20 MEQ: 29.8 INJECTION, SOLUTION INTRAVENOUS at 13:30

## 2020-08-09 RX ADMIN — CEFEPIME 2 G: 2 INJECTION, POWDER, FOR SOLUTION INTRAMUSCULAR; INTRAVENOUS at 01:58

## 2020-08-09 RX ADMIN — Medication 15 ML: at 20:50

## 2020-08-09 RX ADMIN — POTASSIUM CHLORIDE 20 MEQ: 29.8 INJECTION, SOLUTION INTRAVENOUS at 14:44

## 2020-08-09 RX ADMIN — Medication 15 ML: at 08:58

## 2020-08-09 RX ADMIN — INSULIN LISPRO 1 UNITS: 100 INJECTION, SOLUTION INTRAVENOUS; SUBCUTANEOUS at 00:37

## 2020-08-09 RX ADMIN — FAMOTIDINE 20 MG: 10 INJECTION, SOLUTION INTRAVENOUS at 20:50

## 2020-08-09 RX ADMIN — METRONIDAZOLE 500 MG: 500 INJECTION, SOLUTION INTRAVENOUS at 00:31

## 2020-08-09 RX ADMIN — METRONIDAZOLE 500 MG: 500 INJECTION, SOLUTION INTRAVENOUS at 23:32

## 2020-08-09 RX ADMIN — PROPOFOL 25 MCG/KG/MIN: 10 INJECTION, EMULSION INTRAVENOUS at 01:58

## 2020-08-09 RX ADMIN — PROPOFOL 15 MCG/KG/MIN: 10 INJECTION, EMULSION INTRAVENOUS at 10:20

## 2020-08-09 RX ADMIN — METRONIDAZOLE 500 MG: 500 INJECTION, SOLUTION INTRAVENOUS at 16:23

## 2020-08-09 RX ADMIN — DEXMEDETOMIDINE HYDROCHLORIDE 0.4 MCG/KG/HR: 100 INJECTION, SOLUTION, CONCENTRATE INTRAVENOUS at 19:58

## 2020-08-09 RX ADMIN — SODIUM CHLORIDE, POTASSIUM CHLORIDE, SODIUM LACTATE AND CALCIUM CHLORIDE: 600; 310; 30; 20 INJECTION, SOLUTION INTRAVENOUS at 21:23

## 2020-08-09 RX ADMIN — Medication 50 MCG/HR: at 19:59

## 2020-08-09 ASSESSMENT — PULMONARY FUNCTION TESTS
PIF_VALUE: 19
PIF_VALUE: 20
PIF_VALUE: 16
PIF_VALUE: 21
PIF_VALUE: 20
PIF_VALUE: 17
PIF_VALUE: 16

## 2020-08-09 NOTE — PROGRESS NOTES
[address] in to see pt.states she removed dressing, needs new  Dressings applied. applied. 1435 incision is stapled with some skip areas noted with packing. Placed dry 4x4 over this and covered with abd.   1515 plan for spontaneous breathing trial. Pt PS on vent  decreased to 16. Noted pt with increase wob. Also spo2 decresed to 83-84 %. 1520 notified resp therapy. increased PS to 18 and o2 to 40%  1600 pt resting easier.

## 2020-08-09 NOTE — PROGRESS NOTES
7.7  --   --   --  7.4  --   --    HGB 12.7 11.3*  --    < >  --  9.9* 7.0* 9.5*   HCT 40.9 37.1  --    < >  --  31.9* 22.8* 31.3*    255  --   --   --  225  --   --    *  --  139   < > 136 137 140  --    K 3.7  --  3.5   < > 3.7 3.5 3.3*  --    CL 99  --  109   < > 107 108 107  --    CO2 23  --  18*   < > 19* 22* 25  --    BUN 19  --  21   < > 24* 25* 24*  --    CREATININE 0.9  --  0.8   < > 0.7 0.7 0.7  --    MG  --   --  1.6  --   --  1.6  --   --    PHOS  --   --   --   --   --  1.9*  --   --    CALCIUM 8.5  --  7.2*   < > 7.4* 7.5* 7.3*  --     < > = values in this interval not displayed. No results for input(s): PTT, INR in the last 72 hours. Invalid input(s): PT  Recent Labs     08/07/20  1054 08/08/20  1000 08/09/20  0539   AST 14  --  14   ALT 12  --  9*   BILITOT 0.6  --  0.4   BILIDIR 0.3  --   --    LIPASE 4.9*  --   --    LACTA  --  2.0  --      No results for input(s): TROPONINT in the last 72 hours. RADIOLOGY     XR CHEST PORTABLE   Final Result   ET node G-tube positions as above. Left lower lobe atelectasis or infiltrate. **This report has been created using voice recognition software. It may contain minor errors which are inherent in voice recognition technology. **      Final report electronically signed by Dr. Satya Live on 8/8/2020 9:54 AM      XR CHEST PORTABLE   Final Result      Interval placement of a right jugular central venous catheter, tip in the mid SVC, no pneumothorax. New opacity at the lateral left lung base which may represent a small infiltrate, atelectasis or small left pleural effusion. Interstitial prominence, acute versus chronic. See discussion above and correlate clinically. **This report has been created using voice recognition software. It may contain minor errors which are inherent in voice recognition technology. **      Final report electronically signed by Dr. Hayley Angel on 8/8/2020 5:53 AM      XR CHEST 1 VIEW   Final Result   No confluent infiltrate. PA and lateral radiographs could be performed if indicated clinically. **This report has been created using voice recognition software. It may contain minor errors which are inherent in voice recognition technology. **      Final report electronically signed by Dr. Valdez Moreno on 8/7/2020 1:30 PM      US NON OB TRANSVAGINAL   Final Result      Large complex lesion in the left ovary could relate to abscess or hemorrhagic neoplasm either benign or malignant. Lack of significant ascites mitigates against malignancy. **This report has been created using voice recognition software. It may contain minor errors which are inherent in voice recognition technology. **      Final report electronically signed by Dr. Valdez Moreno on 8/7/2020 1:44 PM      US DUP ABD PEL RETRO SCROT COMPLETE   Final Result      Large complex lesion in the left ovary could relate to abscess or hemorrhagic neoplasm either benign or malignant. Lack of significant ascites mitigates against malignancy. **This report has been created using voice recognition software. It may contain minor errors which are inherent in voice recognition technology. **      Final report electronically signed by Dr. Valdez Moreno on 8/7/2020 1:44 PM      CT ABDOMEN PELVIS W IV CONTRAST Additional Contrast? None   Final Result   Lingular pneumonia. Cholelithiasis. Large pelvic mass which is mildly inflamed likely relating to the left ovary. Benign and malignant etiologies are possible. **This report has been created using voice recognition software. It may contain minor errors which are inherent in voice recognition technology. **      Final report electronically signed by Dr. Valdez Moreno on 8/7/2020 1:23 PM      XR ABDOMEN (KUB) (SINGLE AP VIEW)    (Results Pending)       Electronically signed by Maribeth La MD on 8/9/2020 at 2:50 PM

## 2020-08-09 NOTE — PLAN OF CARE
Problem: Aspiration:  Goal: Absence of aspiration  Description: Absence of aspiration  Outcome: Met This Shift  Note: No signs of aspiration     Problem: Tissue Perfusion - Cardiopulmonary, Altered:  Goal: Absence of angina  Description: Absence of angina  Outcome: Met This Shift     Problem: Tissue Perfusion - Cardiopulmonary, Altered:  Goal: Hemodynamic stability will improve  Description: Hemodynamic stability will improve  Outcome: Met This Shift  Note: With levo     Problem: Impaired respiratory status  Goal: Normal spontaneous ventilation  8/8/2020 1802 by Verena Steel RCP  Outcome: Ongoing     Problem: Discharge Planning:  Goal: Participates in care planning  Description: Participates in care planning  Outcome: Ongoing  Note: Plan home when able     Problem: Airway Clearance - Ineffective:  Goal: Ability to maintain a clear airway will improve  Description: Ability to maintain a clear airway will improve  Outcome: Ongoing  Note: Pt remains on vent     Problem: Cardiac Output - Decreased:  Goal: Hemodynamic stability will improve  Description: Hemodynamic stability will improve  Outcome: Ongoing  Note: On levo     Problem: Gas Exchange - Impaired:  Goal: Levels of oxygenation will improve  Description: Levels of oxygenation will improve  Outcome: Ongoing  Note: Weaned o2. Problem: Pain:  Goal: Recognizes and communicates pain  Description: Recognizes and communicates pain  Outcome: Ongoing  Note: Nods and motions.      Problem: Serum Glucose Level - Abnormal:  Goal: Ability to maintain appropriate glucose levels will improve to within specified parameters  Description: Ability to maintain appropriate glucose levels will improve to within specified parameters  Outcome: Ongoing     Problem: Skin Integrity - Impaired:  Goal: Will show no infection signs and symptoms  Description: Will show no infection signs and symptoms  Outcome: Ongoing  Note: No fever     Problem: Skin Integrity - Impaired:  Goal: Shift  Note: Pain is ongoing     Problem: Pain:  Goal: Control of chronic pain  Description: Control of chronic pain  Outcome: Not Met This Shift  Note: Pain is ongoing     Problem: Sleep Pattern Disturbance:  Goal: Appears well-rested  Description: Appears well-rested  Outcome: Not Met This Shift  Note: C/o being tired     Problem: Discharge Planning:  Goal: Discharged to appropriate level of care  Description: Discharged to appropriate level of care  Note: Plan home when able. Care plan reviewed with patient and son. Patient and son verbalize understanding of the plan of care and contribute to goal setting.

## 2020-08-09 NOTE — PLAN OF CARE
Problem: Nutrition  Goal: Optimal nutrition therapy  8/8/2020 2248 by Santo Grace RN  Outcome: Ongoing  Note: Patient at risk for altered nutrition d/t being NPO at present time. Patient NPO d/t need for oral intubation and recent abdominal surgery. Monitoring patient for adverse nutritional deficits and reporting abnormalities to provider. Problem: Discharge Planning:  Goal: Discharged to appropriate level of care  Description: Discharged to appropriate level of care  8/8/2020 2248 by Santo Grace RN  Outcome: Ongoing  Note: Discharge plan reviewed. Patient unable to actively participate in current discharge plan. Case management/ following patient. Problem: Airway Clearance - Ineffective:  Goal: Ability to maintain a clear airway will improve  Description: Ability to maintain a clear airway will improve  8/8/2020 2248 by Santo Grace RN  Outcome: Ongoing  Note: Patient at risk for ineffective airway clearance d/t need for mechanical ventilator support via oral ETT. Patient remains on ventilator support this shift. Oral suctioning provided every 2-4 hours and PRN t/o this shift. Will attempt ventilator weaning in AM, as patient tolerates. Problem: Anxiety/Stress:  Goal: Level of anxiety will decrease  Description: Level of anxiety will decrease  8/8/2020 2248 by Santo Grace RN  Outcome: Ongoing  Note: Patient at risk for increased anxiety d/t recent abdominal surgery and need for mechanical ventilator support at present time. Current interventions to decrease anxiety level include therapeutic speech and continuous sedation medications in place. Interventions effective this shift. Problem: Aspiration:  Goal: Absence of aspiration  Description: Absence of aspiration  8/8/2020 2248 by Santo Grace RN  Outcome: Ongoing  Note: Patient at risk for aspiration this shift d/t need for oral intubation and mechanical ventilator support.  Aspirations precautions remain in place. Patient's HOB remains elevated to 30 degrees t/o this shift. Oral suctioning provided every 2-4 hours. NG tube remain in place to LIWS. Problem: Bowel Function - Altered:  Goal: Bowel elimination is within specified parameters  Description: Bowel elimination is within specified parameters  8/8/2020 2248 by Katerin Wilson RN  Outcome: Ongoing  Note: Patient at risk for altered bowel function d/t recent abdominal surgery. Patient's bowel sounds remain absent d/t recent surgery. Continuing to monitor and reporting abnormalities to provider. Problem: Cardiac Output - Decreased:  Goal: Hemodynamic stability will improve  Description: Hemodynamic stability will improve  8/8/2020 2248 by Katerin Wilson RN  Outcome: Ongoing  Note: Patient at risk for hemodynamic instability d/t recent surgery. Vital signs monitored frequently t/o this shift. Art line remains in place in right radial artery for continuous blood pressure monitoring. Patient remains on Levophed for blood pressure support this shift, with titrations per order in MAR>      Problem: Serum Glucose Level - Abnormal:  Goal: Ability to maintain appropriate glucose levels will improve to within specified parameters  Description: Ability to maintain appropriate glucose levels will improve to within specified parameters  8/8/2020 2248 by Katerin Wilson RN  Outcome: Ongoing  Note: Patient at risk for abnormal serum glucose level d/t hx of DM. Chemstick assessed every 6 hours t/o this shift and sliding scale insulin dosed per order. No signs of hypoglycemia or hyperglycemia observed at present time. Problem: Skin Integrity - Impaired:  Goal: Absence of new skin breakdown  Description: Absence of new skin breakdown  8/8/2020 2248 by Katerin Wilson RN  Outcome: Ongoing  Note: Patient at risk for decreased skin integrity due to decreased mobility. Patient turned and repositioned every 2 hours and PRN t/o this shift.  No new skin breakdown noted this shift. Problem: Urinary Elimination:  Goal: Signs and symptoms of infection will decrease  Description: Signs and symptoms of infection will decrease  Outcome: Ongoing  Note: Patient at risk for infection d/t insertion of indwelling Bhat catheter. Bhat catheter in place due to need for precise outputs to be monitored S/P surgery. Bhat catheter care provided this shift. No urethral drainage noted this shift. No redness or swelling noted. Bhat catheter remains patent and draining dark, yellow urine. No indications of infection related to placement of Bhat catheter. Will continue to monitor. Problem: Infection - Ventilator-Associated Pneumonia:  Goal: Absence of pulmonary infection  Description: Absence of pulmonary infection  Outcome: Ongoing  Note: Patient at risk for infection d/t need for mechanical ventilation via oral ETT this shift. Patient remains on mechanical ventilation this shift d/t need to maintain airway S/P surgery. Airway remains patent with ventilator support provided. Lung sounds clear but diminished in bilateral upper and lower lung lobes upon auscultation this shift. RT continues to participate in care of patient and assist with ventilator management. Patient endotracheal suctioned every 2-4 hours and PRN t/o this shift. Aspiration precautions remain in effect t/o this shift. Patient remains in semi-fowlers position t/o this shift. Problem: Pain:  Goal: Pain level will decrease  Description: Pain level will decrease  8/8/2020 2248 by Kendrick Finney RN  Note: Patient at risk for increase in pain level d/t recent abdominal surgery. Patient's pain goal is CPOT 0. Current interventions in place to manage acute and/or chronic pain level include PRN pain medication, continuous Fentanyl infusion and non-pharmacological interventions. Patient accepting pain interventions without difficulty.       Problem: Pain:  Goal: Pain level will decrease  Description: Pain level will decrease  8/8/2020 2248 by Brianne Acevedo RN  Note: Patient at risk for increase in pain level d/t recent abdominal surgery. Patient's pain goal is CPOT 0. Current interventions in place to manage acute and/or chronic pain level include PRN pain medication, continuous Fentanyl infusion and non-pharmacological interventions. Patient accepting pain interventions without difficulty. Problem: Infection - Central Venous Catheter-Associated Bloodstream Infection:  Goal: Will show no infection signs and symptoms  Description: Will show no infection signs and symptoms  Note: Patient at risk for infection d/t presence of central line. Central line/PICC line noted in patient's right IJ. Dressing remains clean, dry and intact throughout this shift. Antimicrobial patch in place at insertion site and tegarderm dressing covering site. No redness, drainage or swelling noted at insertion site. No indications of infection noted. Continuing to monitor. Problem: Infection - Surgical Site:  Goal: Will show no infection signs and symptoms  Description: Will show no infection signs and symptoms  Note: Patient at risk for infection d/t presence of central line. Central line/PICC line noted in patient's right IJ. Dressing remains clean, dry and intact throughout this shift. Antimicrobial patch in place at insertion site and tegarderm dressing covering site. No redness, drainage or swelling noted at insertion site. No indications of infection noted. Continuing to monitor. Plan of care reviewed. Patient unable to verbalize understanding and participate in goal setting.

## 2020-08-09 NOTE — PROGRESS NOTES
20  SpO2: 95 %  Position: [facility]  Humidification Source: HME  Oral Care: Mouth swabbed, Mouth moisturizer, Mouth suctioned, Suction toothette  Subglottic Suction Done?: No      LUNGS:  Bilateral crackles but with good air exchange  CARDIOVASCULAR:  Normal apical impulse, regular rate and rhythm  ABDOMEN:  Softly distended. Absent BS dressing with minimal drainage  MUSCULOSKELETAL:  there is no redness, warmth, or swelling of the joints    Data:  CBC:   Lab Results   Component Value Date    WBC 7.4 08/09/2020    RBC 3.23 08/09/2020    HGB 9.9 08/09/2020    HCT 31.9 08/09/2020    MCV 98.8 08/09/2020    RDW 13.2 03/05/2018     08/09/2020     BMP:    Lab Results   Component Value Date     08/09/2020    K 3.5 08/09/2020    K 3.5 08/08/2020     08/09/2020    CO2 22 08/09/2020    BUN 25 08/09/2020    CREATININE 0.7 08/09/2020    CALCIUM 7.5 08/09/2020    LABGLOM 85 08/09/2020    GLUCOSE 171 08/09/2020     Urine Culture:  neg  Blood Culture: neg    Sputum Culture:  No components found for: CSPUTUM  CVP Mean:      ICU guidelines/prophylaxis active for the following:    insulin guidelines, DVT prophylaxis and ulcer prophylaxis    ASSESSMENT & PLAN:    Active Problems:  S/P exp lap for TOA. Maintain dressing until tomorrow  ID -Continue metronidazole and Maxipime.  No positive cultures at this point  Bowel function - expected ileus  Adequate Urine output  Vent per ICU team - they report that they will attempt weaning this afternoon    Appreciate ICU team care  [address] 8/9/2020 10:31 AM

## 2020-08-09 NOTE — ANESTHESIA POSTPROCEDURE EVALUATION
Department of Anesthesiology  Postprocedure Note    Patient: Karie Paula  MRN: [id number]  YOB: 1960  Date of evaluation: 8/9/2020  Time:  1:06 PM     Procedure Summary     Date:  08/08/20 [facility]  [address] [facility]    Anesthesia Start:  5633 Anesthesia Stop:  5377    Procedure:  LAPAROTOMY EXPLORATORY, LEFT SALPINGO OOPHORECTOMY, SMALL BOWEL EXPLORATION (N/A Abdomen) Diagnosis:  (ovarian abscess)    Surgeon:  Dangelo Lechuga MD Responsible Provider:  Reyes Ferguson MD    Anesthesia Type:  general ASA Status:  3 - Emergent          Anesthesia Type: No value filed. Edel Phase I:      Edel Phase II:      Last vitals: Reviewed and per EMR flowsheets.        Anesthesia Post Evaluation    Patient location during evaluation: ICU  Patient participation: complete - patient cannot participate  Level of consciousness: sedated and ventilated  Airway patency: patent  Nausea & Vomiting: no nausea and no vomiting  Complications: no  Cardiovascular status: hemodynamically stable  Respiratory status: acceptable  Hydration status: euvolemic

## 2020-08-09 NOTE — PLAN OF CARE
Problem: Aspiration:  Goal: Absence of aspiration  Description: Absence of aspiration  8/9/2020 1108 by Anamika Melendrez RN  Outcome: Met This Shift  Note: None noted     Problem: Restraint Use - Nonviolent/Non-Self-Destructive Behavior:  Goal: Absence of restraint-related injury  Description: Absence of restraint-related injury  Outcome: Met This Shift     Problem: Pain:  Goal: Pain level will decrease  Description: Pain level will decrease  8/9/2020 1108 by Anamika Melendrez RN  Outcome: Ongoing  Note: Cpot assessment zero pain with fentanyl and propofol drips     Problem: Pain:  Goal: Control of acute pain  Description: Control of acute pain  Outcome: Ongoing  Note: As above cpot 0     Problem: Pain:  Goal: Control of chronic pain  Description: Control of chronic pain  Outcome: Ongoing  Note: Cpot 0     Problem: Impaired respiratory status  Goal: Normal spontaneous ventilation  8/9/2020 0244 by Emanuel Barcenas RCP  Outcome: Ongoing     Problem: Discharge Planning:  Goal: Participates in care planning  Description: Participates in care planning  Outcome: Ongoing  Note: Plan home at discharge     Problem: Airway Clearance - Ineffective:  Goal: Ability to maintain a clear airway will improve  Description: Ability to maintain a clear airway will improve  8/9/2020 1108 by Anamika Melendrez RN  Outcome: Ongoing  Note: Ett in place     Problem: Anxiety/Stress:  Goal: Level of anxiety will decrease  Description: Level of anxiety will decrease  8/9/2020 1108 by Anamika Melendrez RN  Outcome: Ongoing  Note: Calm with propofol     Problem: Cardiac Output - Decreased:  Goal: Hemodynamic stability will improve  Description: Hemodynamic stability will improve  8/9/2020 1108 by Anamika Melendrez RN  Outcome: Ongoing  Note: Not as tachy, still on levo.      Problem: Fluid Volume - Imbalance:  Goal: Absence of imbalanced fluid volume signs and symptoms  Description: Absence of imbalanced fluid volume signs and symptoms  Outcome: Ongoing  Note: Urine output improving. Problem: Gas Exchange - Impaired:  Goal: Levels of oxygenation will improve  Description: Levels of oxygenation will improve  Outcome: Ongoing  Note: On bicarb drip for acidosis.      Problem: Pain:  Goal: Pain level will decrease  Description: Pain level will decrease  8/9/2020 1108 by Anamika Melendrez RN  Outcome: Ongoing  Note: Cpot assessment zero pain with fentanyl and propofol drips     Problem: Pain:  Goal: Recognizes and communicates pain  Description: Recognizes and communicates pain  Outcome: Ongoing  Note: Nods and gestures     Problem: Serum Glucose Level - Abnormal:  Goal: Ability to maintain appropriate glucose levels will improve to within specified parameters  Description: Ability to maintain appropriate glucose levels will improve to within specified parameters  8/9/2020 1108 by Anamika Melendrez RN  Outcome: Ongoing  Note: On sliding scale of insulin     Problem: Skin Integrity - Impaired:  Goal: Will show no infection signs and symptoms  Description: Will show no infection signs and symptoms  Outcome: Ongoing  Note: No fever     Problem: Skin Integrity - Impaired:  Goal: Absence of new skin breakdown  Description: Absence of new skin breakdown  8/9/2020 1108 by Anamika Melendrez RN  Outcome: Ongoing  Note: None noted     Problem: Sleep Pattern Disturbance:  Goal: Appears well-rested  Description: Appears well-rested  Outcome: Ongoing  Note: resting     Problem: Tissue Perfusion, Altered:  Goal: Circulatory function within specified parameters  Description: Circulatory function within specified parameters  Outcome: Ongoing  Note: No longer clammy or have mottling     Problem: Tissue Perfusion - Cardiopulmonary, Altered:  Goal: Absence of angina  Description: Absence of angina  Outcome: Ongoing     Problem: Tissue Perfusion - Cardiopulmonary, Altered:  Goal: Hemodynamic stability will improve  Description: Hemodynamic stability will improve  Outcome: for ett when untied   Care plan reviewed with patient and son. Patient and son verbalize understanding of the plan of care and contribute to goal setting.

## 2020-08-09 NOTE — PROGRESS NOTES
CRITICAL CARE PROGRESS NOTE      Patient:  Conrad Baez    Unit/Bed:4D-08/008-A  YOB: 1960  MRN: [id number]   PCP: Kristin Ricci MD  Date of Admission: 8/7/2020  Chief Complaint:- abdominal pain     Assessment and Plan:      1. Acute postoperative pulmonary insufficiency: Patient was intubated prior to surgical procedure. Patient unable to extubate at this time. Patient does have noted left lower lobe pneumonia which could be contributing. Continue to wean. 2. Septic shock: Patient noted to be febrile, hypotensive. Patient did require vasopressors post fluid resuscitation. Continue to wean vasopressors as possible. Continue broad-spectrum antibiotic coverage. General surgery and OB/GYN following. 3. Large left adnexal abscess: Status post ex lap with loop left salpingo oophorectomy and small bowel exploration. Noted russel pus in abdominal cavity. Continue patient on cefepime and Flagyl. 4. Left lower lobe pneumonia: Pseudomonas aeruginosa positive on PCR continue cefepime  5. Normocytic anemia: Monitor, patient did have a charted hemoglobin of 7.0 which is believed related to collection error, repeat within 1 hour was 9.5. Continue to monitor. 6. Hypokalemia: Replacement ordered  7. Elevated procalcitonin: Trends daily. Continue antibiotic therapy. 8. Acute glucose intolerance: A1c 7/9/2020 5.6, sliding scale insulin as needed. 9. ? Ileus: KUB pending for in the morning. No bowel sounds. 10. COPD: Reformed smoker, monitor. 11. GERD: History, monitor  12. Obesity: BMI 32.65, encouraged weight loss. INITIAL H AND P AND ICU COURSE:  Stefani Dawson is a 68-year-old white female who presented to [facility] on 8/7/2020 with complaints of abdominal pain, vaginal bleeding, fever. She has a past medical history of chronic back pain, reformed smoker, COPD, impaired glucose tolerance, GERD.   Per report she identified that she had vaginal bleeding that started with passage of clots and average of 3-4 pads per day. This was approximately onset 2 weeks prior. Patient identified a fever T-max of 102.9. She stated this started 2 days prior to her low abdominal pain. She also noted she had a mass coming out of her vagina which she tried to pull out was unsuccessful secondary to pain. On 8/7/2020 and transvaginal ultrasound noted large complex lesion on the left ovary that could relate to abscess or hemorrhagic neoplasm. CT abdominal pelvis with IV contrast mild groundglass opacities in the lingula suggestive of pneumonia. She did notice to have a mild fatty infiltrated liver and multiple gallstones. She did have a cystic mass noted on left pelvis. Evaluation did occur by OB/GYN Dr. Kennedi Copeland. Endocervical polyp was removed and sent for pathology. On 8/8/2020 ICU was called for hypotension. She did not respond to 2 L normal saline bolus with systolic remaining in the 60s and 70s and heart rate of 120. Levophed drip was started and she was admitted to the ICU.  8/8 patient did undergo exploratory laparotomy with Dr. Sussy Scott and Dr. Felix Pulliam. Patient did have noted pus in the abdomen cavity. She did have noted left ovarian cyst, pathology pending. Patient possible extubation today. Past Medical History: per HPI  Family History: Mother: High blood pressure, asthma Father: Hypertension  Social History: Reformed smoker, 20 pack history, denies alcohol use, denies drug use.     ROS   Intubated and sedated on mechanical ventilation    Scheduled Meds:   chlorhexidine  15 mL Mouth/Throat BID    famotidine (PEPCID) injection  20 mg Intravenous BID    insulin lispro  0-6 Units Subcutaneous 4 times per day    magnesium replacement protocol   Other RX Placeholder    calcium replacement protocol   Other RX Placeholder    sodium chloride flush  10 mL Intravenous 2 times per day    enoxaparin  40 mg Subcutaneous Daily    cefepime  2 g Intravenous Q12H    metroNIDAZOLE  500 mg Intravenous Q8H     Continuous Infusions:   sodium bicarbonate infusion 100 mL/hr at 08/09/20 0158    dexmedetomidine 0.4 mcg/kg/hr (08/09/20 1051)    lactated ringers 100 mL/hr at 08/09/20 0240    dextrose      propofol Stopped (08/09/20 1136)    norepinephrine 5 mcg/min (08/09/20 1058)    fentaNYL (SUBLIMAZE) 500 mcg in sodium chloride 0.9 % 100 mL 50 mcg/hr (08/09/20 0858)       PHYSICAL EXAMINATION:  T:  100.4.  P:  108. RR:  15. B/P:  103/59. FiO2:  30. O2 Sat:  95.  I/O:  8735/1053  Body mass index is 32.65 kg/m². General:   Acute on chronically ill-appearing white female  HEENT:  normocephalic and atraumatic. No scleral icterus. PERR  Neck: supple. No Thyromegaly. Lungs: clear to auscultation. No retractions  Cardiac: Sinus tachycardia. No JVD. Abdomen: soft, distended, midline incision with dressing in place. Extremities:  No clubbing, cyanosis, or edema x 4. Vasculature: capillary refill < 3 seconds. Palpable dorsalis pedis pulses. Skin:  warm and dry. Psych: Awakes and follows commands on mechanical ventilation  Lymph:  No supraclavicular adenopathy. Neurologic:  No focal deficit. No seizures. Data: (All radiographs, tracings, PFTs, and imaging are personally viewed and interpreted unless otherwise noted).  Sodium 140, potassium 3.3, chloride 107, CO2 25, BUN 24, creatinine 0.7, anion gap 8.0, glucose 148.  Procalcitonin 17.86   WBC 7.4, hemoglobin 9.5, hematocrit 31.3, platelet count 064.  Telemetry shows sinus tachycardia   Pneumonia PCR positive for Pseudomonas aeruginosa   Blood cultures preliminary negative   CT abdomen pelvis 8/7/2020 reports lingular pneumonia, cholelithiasis, large pelvic mass which is mildly inflamed likely related to left ovary, benign and malignant etiologies are possible.    Surgical pathology pending      Meets Continued ICU Level Care Criteria:    [x] Yes   [] No - Transfer Planned to listed location:  [] HOSPITALIST CONTACTED- DR     Case and plan discussed with Dr. Alin Umaña and Dr. Alanis Adame. Electronically signed by Yuliana Ramirez.  KYLE Stiles - [facility]  CRITICAL CARE SPECIALIST

## 2020-08-10 ENCOUNTER — APPOINTMENT (OUTPATIENT)
Dept: GENERAL RADIOLOGY | Age: 60
DRG: 853 | End: 2020-08-10
Payer: COMMERCIAL

## 2020-08-10 LAB
ALLEN TEST: POSITIVE
ANION GAP SERPL CALCULATED.3IONS-SCNC: 7 MEQ/L (ref 8–16)
BASE EXCESS (CALCULATED): -1.3 MMOL/L (ref -2.5–2.5)
BASE EXCESS (CALCULATED): 2.9 MMOL/L (ref -2.5–2.5)
BASOPHILS # BLD: 0.6 %
BASOPHILS ABSOLUTE: 0 THOU/MM3 (ref 0–0.1)
BUN BLDV-MCNC: 26 MG/DL (ref 7–22)
CA 125: 109 U/ML
CALCIUM IONIZED: 1.07 MMOL/L (ref 1.12–1.32)
CALCIUM SERPL-MCNC: 7.3 MG/DL (ref 8.5–10.5)
CHLORIDE BLD-SCNC: 111 MEQ/L (ref 98–111)
CO2: 25 MEQ/L (ref 23–33)
COLLECTED BY:: ABNORMAL
COLLECTED BY:: ABNORMAL
CREAT SERPL-MCNC: 0.6 MG/DL (ref 0.4–1.2)
DEVICE: ABNORMAL
DEVICE: ABNORMAL
EOSINOPHIL # BLD: 0.6 %
EOSINOPHILS ABSOLUTE: 0 THOU/MM3 (ref 0–0.4)
ERYTHROCYTE [DISTWIDTH] IN BLOOD BY AUTOMATED COUNT: 13.4 % (ref 11.5–14.5)
ERYTHROCYTE [DISTWIDTH] IN BLOOD BY AUTOMATED COUNT: 49.7 FL (ref 35–45)
GENITAL CULTURE, ROUTINE: NORMAL
GFR SERPL CREATININE-BSD FRML MDRD: > 90 ML/MIN/1.73M2
GLUCOSE BLD-MCNC: 106 MG/DL (ref 70–108)
GLUCOSE BLD-MCNC: 113 MG/DL (ref 70–108)
GLUCOSE BLD-MCNC: 122 MG/DL (ref 70–108)
GLUCOSE BLD-MCNC: 134 MG/DL (ref 70–108)
GRAM STAIN RESULT: NORMAL
HCO3: 26 MMOL/L (ref 23–28)
HCO3: 31 MMOL/L (ref 23–28)
HCT VFR BLD CALC: 32.3 % (ref 37–47)
HEMOGLOBIN: 9.6 GM/DL (ref 12–16)
IFIO2: 30
IMMATURE GRANS (ABS): 0.68 THOU/MM3 (ref 0–0.07)
IMMATURE GRANULOCYTES: 9.9 %
LYMPHOCYTES # BLD: 9.9 %
LYMPHOCYTES ABSOLUTE: 0.7 THOU/MM3 (ref 1–4.8)
MAGNESIUM: 1.8 MG/DL (ref 1.6–2.4)
MCH RBC QN AUTO: 29.8 PG (ref 26–33)
MCHC RBC AUTO-ENTMCNC: 29.7 GM/DL (ref 32.2–35.5)
MCV RBC AUTO: 100.3 FL (ref 81–99)
MODE: ABNORMAL
MONOCYTES # BLD: 6.4 %
MONOCYTES ABSOLUTE: 0.4 THOU/MM3 (ref 0.4–1.3)
NUCLEATED RED BLOOD CELLS: 0 /100 WBC
O2 SATURATION: 91 %
O2 SATURATION: 92 %
PCO2: 54 MMHG (ref 35–45)
PCO2: 64 MMHG (ref 35–45)
PH BLOOD GAS: 7.29 (ref 7.35–7.45)
PH BLOOD GAS: 7.29 (ref 7.35–7.45)
PHOSPHORUS: 2 MG/DL (ref 2.4–4.7)
PLATELET # BLD: 206 THOU/MM3 (ref 130–400)
PLATELET ESTIMATE: ADEQUATE
PMV BLD AUTO: 10.4 FL (ref 9.4–12.4)
PO2: 70 MMHG (ref 71–104)
PO2: 72 MMHG (ref 71–104)
POTASSIUM SERPL-SCNC: 4.3 MEQ/L (ref 3.5–5.2)
PROCALCITONIN: 11.8 NG/ML (ref 0.01–0.09)
RBC # BLD: 3.22 MILL/MM3 (ref 4.2–5.4)
SCAN OF BLOOD SMEAR: NORMAL
SEG NEUTROPHILS: 72.6 %
SEGMENTED NEUTROPHILS ABSOLUTE COUNT: 5 THOU/MM3 (ref 1.8–7.7)
SET PEEP: 6 MMHG
SET PRESS SUPP: 12 CMH2O
SET RESPIRATORY RATE: 12 BPM
SODIUM BLD-SCNC: 143 MEQ/L (ref 135–145)
SOURCE, BLOOD GAS: ABNORMAL
URINE CULTURE REFLEX: NORMAL
URINE CULTURE, ROUTINE: NORMAL
WBC # BLD: 6.9 THOU/MM3 (ref 4.8–10.8)

## 2020-08-10 PROCEDURE — 82330 ASSAY OF CALCIUM: CPT

## 2020-08-10 PROCEDURE — 6360000002 HC RX W HCPCS: Performed by: OBSTETRICS & GYNECOLOGY

## 2020-08-10 PROCEDURE — 2580000003 HC RX 258: Performed by: OBSTETRICS & GYNECOLOGY

## 2020-08-10 PROCEDURE — 2580000003 HC RX 258: Performed by: INTERNAL MEDICINE

## 2020-08-10 PROCEDURE — 36600 WITHDRAWAL OF ARTERIAL BLOOD: CPT

## 2020-08-10 PROCEDURE — 82803 BLOOD GASES ANY COMBINATION: CPT

## 2020-08-10 PROCEDURE — 94003 VENT MGMT INPAT SUBQ DAY: CPT

## 2020-08-10 PROCEDURE — 99024 POSTOP FOLLOW-UP VISIT: CPT | Performed by: SURGERY

## 2020-08-10 PROCEDURE — 6360000002 HC RX W HCPCS: Performed by: NURSE PRACTITIONER

## 2020-08-10 PROCEDURE — 6370000000 HC RX 637 (ALT 250 FOR IP): Performed by: NURSE PRACTITIONER

## 2020-08-10 PROCEDURE — 36415 COLL VENOUS BLD VENIPUNCTURE: CPT

## 2020-08-10 PROCEDURE — 94640 AIRWAY INHALATION TREATMENT: CPT

## 2020-08-10 PROCEDURE — 83735 ASSAY OF MAGNESIUM: CPT

## 2020-08-10 PROCEDURE — 84145 PROCALCITONIN (PCT): CPT

## 2020-08-10 PROCEDURE — 71045 X-RAY EXAM CHEST 1 VIEW: CPT

## 2020-08-10 PROCEDURE — 82948 REAGENT STRIP/BLOOD GLUCOSE: CPT

## 2020-08-10 PROCEDURE — 74018 RADEX ABDOMEN 1 VIEW: CPT

## 2020-08-10 PROCEDURE — APPSS45 APP SPLIT SHARED TIME 31-45 MINUTES: Performed by: NURSE PRACTITIONER

## 2020-08-10 PROCEDURE — 2700000000 HC OXYGEN THERAPY PER DAY

## 2020-08-10 PROCEDURE — 85025 COMPLETE CBC W/AUTO DIFF WBC: CPT

## 2020-08-10 PROCEDURE — 2000000000 HC ICU R&B

## 2020-08-10 PROCEDURE — 84100 ASSAY OF PHOSPHORUS: CPT

## 2020-08-10 PROCEDURE — 94770 HC ETCO2 MONITOR DAILY: CPT

## 2020-08-10 PROCEDURE — 6370000000 HC RX 637 (ALT 250 FOR IP): Performed by: OBSTETRICS & GYNECOLOGY

## 2020-08-10 PROCEDURE — 2500000003 HC RX 250 WO HCPCS: Performed by: INTERNAL MEDICINE

## 2020-08-10 PROCEDURE — 2580000003 HC RX 258: Performed by: NURSE PRACTITIONER

## 2020-08-10 PROCEDURE — 36592 COLLECT BLOOD FROM PICC: CPT

## 2020-08-10 PROCEDURE — 2500000003 HC RX 250 WO HCPCS: Performed by: NURSE PRACTITIONER

## 2020-08-10 PROCEDURE — 89220 SPUTUM SPECIMEN COLLECTION: CPT

## 2020-08-10 PROCEDURE — 80048 BASIC METABOLIC PNL TOTAL CA: CPT

## 2020-08-10 PROCEDURE — 94761 N-INVAS EAR/PLS OXIMETRY MLT: CPT

## 2020-08-10 PROCEDURE — 99291 CRITICAL CARE FIRST HOUR: CPT | Performed by: INTERNAL MEDICINE

## 2020-08-10 PROCEDURE — 2500000003 HC RX 250 WO HCPCS: Performed by: OBSTETRICS & GYNECOLOGY

## 2020-08-10 RX ORDER — ACETAMINOPHEN 650 MG/1
650 SUPPOSITORY RECTAL EVERY 4 HOURS PRN
Status: DISCONTINUED | OUTPATIENT
Start: 2020-08-10 | End: 2020-08-18 | Stop reason: HOSPADM

## 2020-08-10 RX ORDER — DEXAMETHASONE SODIUM PHOSPHATE 4 MG/ML
4 INJECTION, SOLUTION INTRA-ARTICULAR; INTRALESIONAL; INTRAMUSCULAR; INTRAVENOUS; SOFT TISSUE DAILY
Status: COMPLETED | OUTPATIENT
Start: 2020-08-10 | End: 2020-08-13

## 2020-08-10 RX ORDER — ACETAMINOPHEN 325 MG/1
650 TABLET ORAL EVERY 4 HOURS PRN
Status: DISCONTINUED | OUTPATIENT
Start: 2020-08-10 | End: 2020-08-18 | Stop reason: HOSPADM

## 2020-08-10 RX ADMIN — FAMOTIDINE 20 MG: 10 INJECTION, SOLUTION INTRAVENOUS at 20:14

## 2020-08-10 RX ADMIN — Medication 75 MCG/HR: at 04:19

## 2020-08-10 RX ADMIN — SODIUM CHLORIDE, PRESERVATIVE FREE 10 ML: 5 INJECTION INTRAVENOUS at 20:14

## 2020-08-10 RX ADMIN — ALBUTEROL SULFATE 10 MG: 2.5 SOLUTION RESPIRATORY (INHALATION) at 05:04

## 2020-08-10 RX ADMIN — POTASSIUM PHOSPHATE, MONOBASIC AND POTASSIUM PHOSPHATE, DIBASIC 14.61 MMOL: 224; 236 INJECTION, SOLUTION, CONCENTRATE INTRAVENOUS at 07:24

## 2020-08-10 RX ADMIN — Medication 75 MCG/HR: at 11:18

## 2020-08-10 RX ADMIN — SODIUM CHLORIDE, POTASSIUM CHLORIDE, SODIUM LACTATE AND CALCIUM CHLORIDE: 600; 310; 30; 20 INJECTION, SOLUTION INTRAVENOUS at 21:00

## 2020-08-10 RX ADMIN — Medication 15 ML: at 08:16

## 2020-08-10 RX ADMIN — DEXMEDETOMIDINE HYDROCHLORIDE 0.3 MCG/KG/HR: 100 INJECTION, SOLUTION, CONCENTRATE INTRAVENOUS at 22:16

## 2020-08-10 RX ADMIN — FAMOTIDINE 20 MG: 10 INJECTION, SOLUTION INTRAVENOUS at 08:16

## 2020-08-10 RX ADMIN — CEFEPIME 2 G: 2 INJECTION, POWDER, FOR SOLUTION INTRAMUSCULAR; INTRAVENOUS at 00:56

## 2020-08-10 RX ADMIN — ENOXAPARIN SODIUM 40 MG: 40 INJECTION SUBCUTANEOUS at 20:13

## 2020-08-10 RX ADMIN — ACETAMINOPHEN 650 MG: 650 SUPPOSITORY RECTAL at 03:34

## 2020-08-10 RX ADMIN — CEFEPIME 2 G: 2 INJECTION, POWDER, FOR SOLUTION INTRAMUSCULAR; INTRAVENOUS at 12:10

## 2020-08-10 RX ADMIN — FENTANYL CITRATE 25 MCG: 50 INJECTION INTRAMUSCULAR; INTRAVENOUS at 20:14

## 2020-08-10 RX ADMIN — METRONIDAZOLE 500 MG: 500 INJECTION, SOLUTION INTRAVENOUS at 16:21

## 2020-08-10 RX ADMIN — METRONIDAZOLE 500 MG: 500 INJECTION, SOLUTION INTRAVENOUS at 23:58

## 2020-08-10 RX ADMIN — DEXMEDETOMIDINE HYDROCHLORIDE 0.4 MCG/KG/HR: 100 INJECTION, SOLUTION, CONCENTRATE INTRAVENOUS at 07:29

## 2020-08-10 RX ADMIN — SODIUM CHLORIDE, PRESERVATIVE FREE 10 ML: 5 INJECTION INTRAVENOUS at 08:26

## 2020-08-10 RX ADMIN — ACETAMINOPHEN 650 MG: 650 SUPPOSITORY RECTAL at 08:16

## 2020-08-10 RX ADMIN — ACETAMINOPHEN 650 MG: 650 SUPPOSITORY RECTAL at 18:25

## 2020-08-10 RX ADMIN — DEXAMETHASONE SODIUM PHOSPHATE 4 MG: 4 INJECTION, SOLUTION INTRAMUSCULAR; INTRAVENOUS at 12:10

## 2020-08-10 RX ADMIN — Medication 15 ML: at 20:14

## 2020-08-10 RX ADMIN — METRONIDAZOLE 500 MG: 500 INJECTION, SOLUTION INTRAVENOUS at 08:24

## 2020-08-10 RX ADMIN — Medication 75 MCG/HR: at 19:09

## 2020-08-10 RX ADMIN — SODIUM CHLORIDE, POTASSIUM CHLORIDE, SODIUM LACTATE AND CALCIUM CHLORIDE: 600; 310; 30; 20 INJECTION, SOLUTION INTRAVENOUS at 09:31

## 2020-08-10 ASSESSMENT — PULMONARY FUNCTION TESTS
PIF_VALUE: 22
PIF_VALUE: 18
PIF_VALUE: 18
PIF_VALUE: 19
PIF_VALUE: 18
PIF_VALUE: 18

## 2020-08-10 NOTE — PLAN OF CARE
Problem: Tissue Perfusion - Cardiopulmonary, Altered:  Goal: Absence of angina  Description: Absence of angina  Outcome: Met This Shift     Problem: Skin Integrity:  Goal: Absence of new skin breakdown  Description: Absence of new skin breakdown  Outcome: Met This Shift  Note: No skin breakdown     Problem: Pain:  Goal: Pain level will decrease  Description: Pain level will decrease  8/10/2020 1132 by Twin Coleman RN  Outcome: Ongoing  Note: Cpot assessment for pain     Problem: Pain:  Goal: Control of acute pain  Description: Control of acute pain  Outcome: Ongoing     Problem: Pain:  Goal: Control of chronic pain  Description: Control of chronic pain  Outcome: Ongoing     Problem: Impaired respiratory status  Goal: Normal spontaneous ventilation  8/10/2020 1051 by Gina Murdock  Outcome: Ongoing  Note: Pt remains on mechanical ventilation with PC settings of 20/10 rate 12 FiO2 30%. Adjusting ventilator settings as needed and will wean as tolerated. SBT to be performed when appropriate. Problem: Discharge Planning:  Goal: Participates in care planning  Description: Participates in care planning  8/10/2020 1132 by Twin Coleman RN  Outcome: Ongoing  Note: Able to nad and gesture     Problem: Discharge Planning:  Goal: Discharged to appropriate level of care  Description: Discharged to appropriate level of care  Outcome: Ongoing  Note: Plan home at discharge. Problem: Airway Clearance - Ineffective:  Goal: Ability to maintain a clear airway will improve  Description: Ability to maintain a clear airway will improve  8/10/2020 1132 by Twin Coleman RN  Outcome: Ongoing  Note: Ett in place     Problem: Anxiety/Stress:  Goal: Level of anxiety will decrease  Description: Level of anxiety will decrease  Outcome: Ongoing  Note: Pt calm, resting arms untied, does not reach for tubes.      Problem: Aspiration:  Goal: Absence of aspiration  Description: Absence of aspiration  8/10/2020 1132 by Marylou Cox Ongoing  Note: No longer mottled, still om levo. Problem: Tissue Perfusion - Cardiopulmonary, Altered:  Goal: Hemodynamic stability will improve  Description: Hemodynamic stability will improve  Outcome: Ongoing  Note: Still on levo     Problem: Infection - Central Venous Catheter-Associated Bloodstream Infection:  Goal: Will show no infection signs and symptoms  Description: Will show no infection signs and symptoms  8/10/2020 1132 by Cassandra Montana RN  Outcome: Ongoing  Note: No redness at site     Problem: Infection - Surgical Site:  Goal: Will show no infection signs and symptoms  Description: Will show no infection signs and symptoms  8/10/2020 1132 by Cassandra Montana RN  Outcome: Ongoing  Note: No redness at site     Problem: Urinary Elimination:  Goal: Signs and symptoms of infection will decrease  Description: Signs and symptoms of infection will decrease  8/10/2020 1132 by Cassandra Montana RN  Outcome: Ongoing     Problem: Infection - Ventilator-Associated Pneumonia:  Goal: Absence of pulmonary infection  Description: Absence of pulmonary infection  8/10/2020 1132 by Cassandra Montana RN  Outcome: Ongoing  Note: Minimal secretions     Problem: Skin Integrity:  Goal: Will show no infection signs and symptoms  Description: Will show no infection signs and symptoms  8/10/2020 1132 by Cassandra Montana RN  Outcome: Ongoing  Note: No redness at site     Problem: Nutrition  Goal: Optimal nutrition therapy  8/10/2020 1132 by Cassandra Montana RN  Outcome: Not Met This Shift  Note: Pt npo     Problem: Bowel Function - Altered:  Goal: Bowel elimination is within specified parameters  Description: Bowel elimination is within specified parameters  8/10/2020 1132 by Cassandra Montana RN  Outcome: Not Met This Shift  Note: No bowel sounds, no bm.      Problem: Fluid Volume - Imbalance:  Goal: Absence of imbalanced fluid volume signs and symptoms  Description: Absence of imbalanced fluid volume signs and symptoms  8/10/2020 1132 by Nicole Villalobos RN  Outcome: Not Met This Shift  Note: Positive intake     Problem: Nutrition Deficit:  Goal: Ability to achieve adequate nutritional intake will improve  Description: Ability to achieve adequate nutritional intake will improve  Outcome: Not Met This Shift  Note: npo     Problem: Restraint Use - Nonviolent/Non-Self-Destructive Behavior:  Goal: Absence of restraint indications  Description: Absence of restraint indications  8/10/2020 0510 by Dahiana Gonzales. Rae Bray RN  Care plan reviewed with patient . Patient nods understanding of the plan of care and contribute to goal setting.

## 2020-08-10 NOTE — PROGRESS NOTES
MD GISELLA Delgadillo DR GENERAL SURGERY  General Surgery Postoperative Progress Note    Pt Name: Tatiana Hardwick  Medical Record Number: [id number]  Date of Birth 1960   Today's Date: 8/10/2020  ASSESSMENT   1. POD # 2ex lap, abdominal washout , left salpingo-oophorectomy wit adnexal abscess excision performed by Dr. Lana Chase with gynecology  2. Sepsis shock  present at time of admission  3. Purulent peritonitis present at time of surgery  4. Left adnexal abscess    has a past medical history of Allergic rhinitis, Chronic back pain, Headache(784.0), and Panlobular emphysema ([address] PLAN   1. Continue iatrogenic support per critical care team  2. Continue broad-spectrum antibiotics, waiting for final culture results  3. Continue NG decompression, anticipate patient to have prolonged ileus due to amount of intra-abdominal contamination from pus  4. Continue pain control  5. Remove packing tomorrow and start daily packing changes. Brandon Hickey remains stable, she is intubated and sedated.  .Decreasing pressor requirement   CURRENT MEDICATIONS   Scheduled Meds:   phosphorus replacement protocol   Other RX Placeholder    dexamethasone  4 mg Intravenous Daily    chlorhexidine  15 mL Mouth/Throat BID    famotidine (PEPCID) injection  20 mg Intravenous BID    insulin lispro  0-6 Units Subcutaneous 4 times per day    magnesium replacement protocol   Other RX Placeholder    calcium replacement protocol   Other RX Placeholder    sodium chloride flush  10 mL Intravenous 2 times per day    enoxaparin  40 mg Subcutaneous Daily    cefepime  2 g Intravenous Q12H    metroNIDAZOLE  500 mg Intravenous Q8H     Continuous Infusions:   dexmedetomidine 0.3 mcg/kg/hr (08/10/20 0800)    lactated ringers 100 mL/hr at 08/10/20 0931    dextrose      norepinephrine 6 mcg/min (08/10/20 1006)    fentaNYL (SUBLIMAZE) 500 mcg in sodium chloride 0.9 % 100 mL 75 mcg/hr (08/10/20 1118)     PRN Meds:.acetaminophen **OR** acetaminophen, fentanNYL, potassium chloride, potassium chloride, potassium chloride **OR** potassium alternative oral replacement **OR** potassium chloride, glucose, dextrose, glucagon (rDNA), dextrose, sodium chloride flush, promethazine **OR** ondansetron  OBJECTIVE   CURRENT VITALS:  height is 5' 8\" (1.727 m) and weight is 214 lb 11.7 oz (97.4 kg). Her bladder temperature is 99.7 °F (37.6 °C). Her blood pressure is 131/67 and her pulse is 109. Her respiration is 14 and oxygen saturation is 90%. Temperature Range (24h):Temp: 99.7 °F (37.6 °C) Temp  Av.4 °F (38 °C)  Min: 97.9 °F (36.6 °C)  Max: 101.3 °F (38.5 °C)  BP Range (72F): Systolic (23VMD), WLF:675 , Min:66 , UUI:659     Diastolic (25YMO), ITH:63, Min:47, Max:84    Pulse Range (24h): Pulse  Av.8  Min: 88  Max: 116  Respiration Range (24h): Resp  Av.3  Min: 10  Max: 25  Current Pulse Ox (24h):  SpO2: 90 %  Pulse Ox Range (24h):  SpO2  Av.1 %  Min: 90 %  Max: 100 %  Oxygen Amount and Delivery: O2 Flow Rate (L/min): 2 L/min  Incentive Spirometry Tx: Intubated and sedated. Comfortable on vent  Abd soft, non distended, incision clean and packed, dressing replaced with minimal blood tinged drainage. Serous drainage no russel pus  Moves all extremities, no peripheral edema     In: 1720 [I.V.:1720]  Out: 800 [Urine:800]     Date 08/10/20 0000 - 08/10/20 2359   Shift 4752-4053 1214-2993 0799-8694 24 Hour Total   INTAKE   I.V.(mL/kg) 1022. 1(10.5)   1022. 1(10.5)   Shift Total(mL/kg) 1022. 1(10.5)   1022. 1(10.5)   OUTPUT   Urine(mL/kg/hr) 250(0.3) 225  475   Emesis/NG output(mL/kg) 0(0)   0(0)   Shift Total(mL/kg) 250(2.6) 225(2.3)  475(4.9)   Weight (kg) 97.4 97.4 97.4 97.4     LABS     Recent Labs     20  0550 20  0629  20  0539 20  1040 20  1130 20  1630 08/10/20  0320   WBC 7.7  --   --  7.4  --   --   --  6.9   HGB 11.3*  --    < > 9.9* 7.0* 9.5*  --  9.6*   HCT 37.1  --    < > 31.9* 22.8* 31.3*  --  32.3*     --   --  225  --   --   --  206   NA  --  139   < > 137 140  --   --  143   K  --  3.5   < > 3.5 3.3*  --  3.8 4.3   CL  --  109   < > 108 107  --   --  111   CO2  --  18*   < > 22* 25  --   --  25   BUN  --  21   < > 25* 24*  --   --  26*   CREATININE  --  0.8   < > 0.7 0.7  --   --  0.6   MG  --  1.6  --  1.6  --   --   --  1.8   PHOS  --   --   --  1.9*  --   --   --  2.0*   CALCIUM  --  7.2*   < > 7.5* 7.3*  --   --  7.3*    < > = values in this interval not displayed. No results for input(s): PTT, INR in the last 72 hours. Invalid input(s): PT  Recent Labs     08/08/20  1000 08/09/20  0539   AST  --  14   ALT  --  9*   BILITOT  --  0.4   LACTA 2.0  --      No results for input(s): TROPONINT in the last 72 hours. RADIOLOGY     XR CHEST PORTABLE   Final Result      Improved lateral left basilar infiltrate/atelectasis/small left pleural effusion. Mild atelectasis at the medial right lung base. Possible left hilar adenopathy which appears to be mildly improved. **This report has been created using voice recognition software. It may contain minor errors which are inherent in voice recognition technology. **      Final report electronically signed by Dr. Bianca Aparicio on 8/10/2020 5:19 AM      XR ABDOMEN (KUB) (SINGLE AP VIEW)   Final Result      Non obstructive bowel gas pattern. No evidence of an ileus. **This report has been created using voice recognition software. It may contain minor errors which are inherent in voice recognition technology. **      Final report electronically signed by Dr. Bianca Aparicio on 8/10/2020 3:26 AM      XR CHEST PORTABLE   Final Result   ET node G-tube positions as above. Left lower lobe atelectasis or infiltrate. **This report has been created using voice recognition software. It may contain minor errors which are inherent in voice recognition technology. **      Final report electronically signed by  Abram Castañeda on 8/8/2020 9:54 AM      XR CHEST PORTABLE   Final Result      Interval placement of a right jugular central venous catheter, tip in the mid SVC, no pneumothorax. New opacity at the lateral left lung base which may represent a small infiltrate, atelectasis or small left pleural effusion. Interstitial prominence, acute versus chronic. See discussion above and correlate clinically. **This report has been created using voice recognition software. It may contain minor errors which are inherent in voice recognition technology. **      Final report electronically signed by Dr. Justin Adan on 8/8/2020 5:53 AM      XR CHEST 1 VIEW   Final Result   No confluent infiltrate. PA and lateral radiographs could be performed if indicated clinically. **This report has been created using voice recognition software. It may contain minor errors which are inherent in voice recognition technology. **      Final report electronically signed by Dr. Salma El on 8/7/2020 1:30 PM      US NON OB TRANSVAGINAL   Final Result      Large complex lesion in the left ovary could relate to abscess or hemorrhagic neoplasm either benign or malignant. Lack of significant ascites mitigates against malignancy. **This report has been created using voice recognition software. It may contain minor errors which are inherent in voice recognition technology. **      Final report electronically signed by Dr. Salma El on 8/7/2020 1:44 PM      US DUP ABD PEL RETRO SCROT COMPLETE   Final Result      Large complex lesion in the left ovary could relate to abscess or hemorrhagic neoplasm either benign or malignant. Lack of significant ascites mitigates against malignancy. **This report has been created using voice recognition software. It may contain minor errors which are inherent in voice recognition technology. **      Final report electronically signed by Dr. Salma El on 8/7/2020 1:44 PM      CT ABDOMEN PELVIS W IV CONTRAST Additional Contrast? None   Final Result   Lingular pneumonia. Cholelithiasis. Large pelvic mass which is mildly inflamed likely relating to the left ovary. Benign and malignant etiologies are possible. **This report has been created using voice recognition software. It may contain minor errors which are inherent in voice recognition technology. **      Final report electronically signed by Dr. Edwin Lam on 8/7/2020 1:23 PM          Electronically signed by Alli Dupree MD on 8/10/2020 at 1:23 PM

## 2020-08-10 NOTE — PROGRESS NOTES
identifies onset vaginal bleeding with passage of clots, averaging 3-4 pads/day. Onset proximately 2 weeks ago. Patient also identifies a fever of 102.9 that started approximately 2 days ago with complaints of lower abdominal pain. She states she noted a mass coming out of her vagina and tried to pull it out unsuccessfully secondary to pain. 8/7/2020 transvaginal ultrasound noted large complex lesion in the left ovary could relate to abscess or hemorrhagic neoplasm. CT abdomen pelvis with IV contrast mild groundglass airspace opacities within the lingula suggest pneumonia. Mild fatty infiltration of liver, multiple gallstones biliary. A spiculated cystic mass within the left pelvis. Large pelvic mass which is mildly inflamed likely related to the left ovary. Evaluation occurred by OB/GYN Dr. Donald Alvarez, an Endocervical polyp was removed and sent for pathology. Patient was sent to [facility] ICU stepdown. 8/8/2020 I was called to evaluate patient secondary to hypotension. Patient was not responsive to 2 L of 0.9 normal saline systolic blood pressure remained 68-74 with a heart rate of 120. Levophed drip was started and patient was admitted to the ICU for further evaluation and care. 8/8/2020: Exploratory laparotomy with left salpingo oophorectomy, small bowel exploration, and irrigation of purulent material on 8/8/2020 for ovarian abscess per Dr. Vamshi Keenan. Return to intensive care on mechanical ventilation. Subjective:- (Last 24 hours)    Unable to complete review of systems as patient is on mechanical ventilation and heavily sedated. Past medical history, family history, social history and allergies reviewed again and is unchanged since admission. ROS (12 point review of systems completed. Pertinent positives noted.  Otherwise ROS is negative)      Scheduled Meds:   phosphorus replacement protocol   Other RX Placeholder    potassium phosphate IVPB  14.61 mmol Intravenous Once    chlorhexidine  15 mL Mouth/Throat BID    famotidine (PEPCID) injection  20 mg Intravenous BID    insulin lispro  0-6 Units Subcutaneous 4 times per day    magnesium replacement protocol   Other RX Placeholder    calcium replacement protocol   Other RX Placeholder    sodium chloride flush  10 mL Intravenous 2 times per day    enoxaparin  40 mg Subcutaneous Daily    cefepime  2 g Intravenous Q12H    metroNIDAZOLE  500 mg Intravenous Q8H     Continuous Infusions:   dexmedetomidine 0.4 mcg/kg/hr (08/10/20 0732)    lactated ringers 100 mL/hr at 08/09/20 2123    dextrose      norepinephrine Stopped (08/10/20 0503)    fentaNYL (SUBLIMAZE) 500 mcg in sodium chloride 0.9 % 100 mL 75 mcg/hr (08/10/20 0709)     PRN Meds:.acetaminophen **OR** acetaminophen, fentanNYL, potassium chloride, potassium chloride, potassium chloride **OR** potassium alternative oral replacement **OR** potassium chloride, glucose, dextrose, glucagon (rDNA), dextrose, sodium chloride flush, promethazine **OR** ondansetron     PHYSICAL EXAMINATION:  Vital signs: Temperature 99.7, respirations 16, pulse 94, blood pressure 93/63, pulse ox 93% on 30% FiO2 per mechanical ventilation. Ventilator settings: CPAP 16/6, RSBI 39, tidal volume 519, FIO2 30%. Intake: 3795 mL's/Output: 1125 mL's. Net +10.7 L  Wt Readings from Last 3 Encounters:   08/09/20 214 lb 11.7 oz (97.4 kg)   07/09/20 205 lb 14.4 oz (93.4 kg)   11/07/19 209 lb 12.8 oz (95.2 kg)      Body mass index is 32.65 kg/m². CAM-ICU:   Sedated-arouses. General: Acutely ill-appearing obese  female resting in bed on mechanical ventilation. HEENT:  normocephalic and atraumatic. No scleral icterus. PERR  Neck: supple. No thyromegaly. Lungs: clear to auscultation, diminished right base and left upper lobe. No retractions or tachypnea. Cardiac: RRR. No JVD. Abdomen: soft. Nontender. Hypoactive bowel sounds right and left upper quadrants.   Surgical dressing dry and intact without shadow. Extremities:  No clubbing, cyanosis. Trace pretibial nonpitting edema. Vasculature: capillary refill < 3 seconds. Palpable dorsalis pedis pulses. Skin:  Normal for ethnicity, warm, and dry. Psych:  Confused. Lymph:  No supraclavicular adenopathy. Neurologic:  No focal deficit. No seizures. ICU PROPHYLAXIS/THERAPY:   Stress ulcer:  [] PPI Agent  [x] H2RA [] Sucralfate [] Other:   VTE:     [x] Enoxaparin    [] Warfarin [] NOAC [] PCD Device:Bilat LE   [] Heparin: [] Subcut / [] IV     DATA :- LABS, RADIOLOGY- All radiographs, tracings, PFTs, and imaging are personally viewed and interpreted unless otherwise noted).  Telemetry: Sinus tachycardia.  Sodium 143, potassium 4.3, chloride 111, CO2 25, BUN 26, creatinine 0.6, anion gap 7.0, ionized calcium 1.0, magnesium 1.8, glucose 106, calcium 7.3, phosphorus 2.0, pro calcitonin 11.80.  On 8/9/2020: Albumin 2.1, alk phos 53, ALT 9, AST 14, bilirubin 0.4.   WBC 6.9, hemoglobin 9.6, hematocrit 32.3, .3, platelet count 763.  Peritoneal fluid culture demonstrates many segmented neutrophils; many gram-negative bacilli.  ABG: pH 7.29, PCO2 64, PO2 72, bicarb 31, base excess 2.9, oxygen saturation 92% on 30% FiO2 18/6, respiratory rate 12.   KUB demonstrates no ileus or free air.  Chest x-ray demonstrates left lower lobe infiltrate, pleural effusion and right lower lobe atelectasis.  Molecular pneumonia panel demonstrates Pseudomonas aeruginosa by PCR. Electronically signed by KYLE Henry CNP on 8/10/2020 at 8:41 AM  CRITICAL CARE SPECIALIST    Patient seen by me. Case discussed with nurse practitioner. Case discussed with Dr. Christopher Tejada. Patient remains critically ill on mechanical ventilator. Patient fails at pressure support 16. Persistent hypercarbic respiratory acidosis. CC time 30 minutes. Time does not include nurse practitioner assessment.   Time does include my direct assessment of the patient and coordination of care. Electronically signed by Lambert Duckworth MD.

## 2020-08-10 NOTE — CARE COORDINATION
8/10/20, 7:37 AM EDT  DISCHARGE PLANNING EVALUATION:    Maci Lieberman       Admitted from: ED 8/7/2020/ [address] [facility] day: 3   Location: -08/008-A Reason for admit: Ovarian mass [N83.8]  Ovarian mass [N83.8] Status: IP  Admit order signed?: yes  PMH:  has a past medical history of Allergic rhinitis, Chronic back pain, Headache(784.0), and Panlobular emphysema ([address] Procedure:   8/7 CT Abd/pelvis: Lingular pneumonia; cholelithiasis; Large pelvic mass which is mildly inflamed likely relating to the left ovary. Benign and malignant etiologies are possible  8/7 US Abd/pelvis: Large complex lesion in the left ovary could relate to abscess or hemorrhagic neoplasm either benign or malignant. Lack of significant ascites mitigates against malignancy  8/7 Transvaginal US: Large pelvic mass which is mildly inflamed likely relating to the left ovary. Benign and malignant etiologies are possible  8/8 LAPAROTOMY EXPLORATORY, LEFT SALPINGO OOPHORECTOMY, SMALL BOWEL EXPLORATION, irrigation of purulent material  8/8 Intubated  8/8 CVC RIJ  8/10 KUB: Non obstructive bowel gas pattern; no evidence of ileus  8/10 CXR:   Improved lateral left basilar infiltrate/atelectasis/small left pleural effusion. Mild atelectasis at the medial right lung base.     Possible left hilar adenopathy which appears to be mildly improved     Medications:  Scheduled Meds:   phosphorus replacement protocol   Other RX Placeholder    potassium phosphate IVPB  14.61 mmol Intravenous Once    chlorhexidine  15 mL Mouth/Throat BID    famotidine (PEPCID) injection  20 mg Intravenous BID    insulin lispro  0-6 Units Subcutaneous 4 times per day    magnesium replacement protocol   Other RX Placeholder    calcium replacement protocol   Other RX Placeholder    sodium chloride flush  10 mL Intravenous 2 times per day    enoxaparin  40 mg Subcutaneous Daily    cefepime  2 g Intravenous Q12H    metroNIDAZOLE  500 mg Intravenous Q8H     Continuous Infusions:   dexmedetomidine 0.4 mcg/kg/hr (08/10/20 2305)    lactated ringers 100 mL/hr at 08/09/20 0683    dextrose      norepinephrine Stopped (08/10/20 0503)    fentaNYL (SUBLIMAZE) 500 mcg in sodium chloride 0.9 % 100 mL 75 mcg/hr (08/10/20 9558)      Pertinent Info/Orders/Treatment Plan: Presented with c/o painless vaginal bleeding that began on 7/28/20, continued and worsened until passed tissue with clots on 8/2, patient noted something protruding from vagina, so pushed it back in. On 8/5, began having fevers. Presented on 8/7 with abdominal pain, nausea, decreased PO intake, burning with urination, and vaginal bleeding. GYN consulted; felt to be d/t left adnexal abscess with protruding polyp. Endocervical polyp removed in ED and sent for pathology. Admitted to . Hypotensive with SBP down to 50's early morning on 8/8, not responsive to fluid boluses, transferred to ICU and started on levophed. Developed acute abdominal pain and taken to OR on 8/8 for exploration, General Surgery consulted intraop - see note above. Returned to ICU post-op on vent. Remains on vent w/ETT on PCV, peep 10, FIO2 30%, sats 100%. Tmax 101.3. 's. Follows commands. Dietitian consulted. PT/OT. Respiratory culture by PCR +pseudomonas aeruginosa. COVID 19 was negative. Cardiac monitoring, I&O, daily weight, oral care, OG to LIWS, SCDs, olivera care. Precedex @ 0.3 mcg/kg/hr, fentanyl @ 75 mcg/hr, IVF, levo @ 6 mcg/min, IV cefepime, IV decadron 4 mg daily, prn IV fentanyl, SSI Q6H, Electrolyte replacement protocols. Na+ 134 - now 143, phos 2, procal 1.7 - up to 17.86 - now 11.8, alb 2.1, wbc 16.5 -now 6.9, hgb 12.7 -now 9.6. Urine +nitrates and trace leukocytes - sent for culture. Blood and peritoneal fluid cultures sent. Diet: Diet NPO Effective Now   Smoking status:  reports that she quit smoking about 2 years ago. Her smoking use included cigarettes. She started smoking about 41 years ago.  She has a 20.00 pack-year smoking history. She has never used smokeless tobacco.   PCP: Carlos Rob MD  Readmission 30 days or less: no  Readmission Risk Score: 14%    Discharge Planning Evaluation  Current Residence:  Independent Living  Living Arrangements:  Alone   Support Systems:  None  Current Services PTA:     Potential Assistance Needed:  N/A  Potential Assistance Purchasing Medications:  No  Does patient want to participate in local refill/ meds to beds program?  No  Type of Home Care Services:  None  Patient expects to be discharged to:  home  Expected Discharge date:  08/10/20  Follow Up Appointment: Best Day/ Time: Friday PM    Patient Goals/Plan/Treatment Preferences: From home alone. No family present at time of rounds. Plan pending clinical course - on vent. Transportation/Food Security/Housekeeping Addressed:  No issues identified.     Evaluation: no

## 2020-08-10 NOTE — PLAN OF CARE
Problem: Impaired respiratory status  Goal: Normal spontaneous ventilation  Outcome: Ongoing  Note: Vent settings optimized to achieve target tidal volume, respiratory rate and ideal oxygen saturations. Will continue to wean as patient tolerates. SBT will be performed when appropriate.

## 2020-08-10 NOTE — PLAN OF CARE
Problem: Impaired respiratory status  Goal: Normal spontaneous ventilation  8/10/2020 1051 by Megan Piper  Outcome: Ongoing  Note: Pt remains on mechanical ventilation with PC settings of 20/10 rate 12 FiO2 30%. Adjusting ventilator settings as needed and will wean as tolerated. SBT to be performed when appropriate.

## 2020-08-10 NOTE — PROGRESS NOTES
[address]  PHYSICAL THERAPY MISSED TREATMENT NOTE  [facility] ICU 4D    Date: 8/10/2020  Patient Name: Omari Vincent        MRN: [id number]   : 1960  (62 y.o.)  Gender: female                REASON FOR MISSED TREATMENT:  Hold treatment per nursing request.  Pt currently intubated, not appropriate for early mobility at this time.  Will check back as able

## 2020-08-10 NOTE — PROGRESS NOTES
Intensive Care Unit  Ob/Gyn  Attending Progress Note          SUBJECTIVE:  Events of the last 24 hours. No real progress toward extubation. Definitely more awake with precedex than diprivan. Answers simple questions.     OBJECTIVE:    Current Medications:  Current Facility-Administered Medications: acetaminophen (TYLENOL) tablet 650 mg, 650 mg, Oral, Q4H PRN **OR** acetaminophen (TYLENOL) suppository 650 mg, 650 mg, Rectal, Q4H PRN  phosphorus replacement protocol, , Other, RX Placeholder  Dexamethasone Sodium Phosphate injection 4 mg, 4 mg, Intravenous, Daily  dexmedetomidine (PRECEDEX) 400 mcg in sodium chloride 0.9 % 100 mL infusion, 0.2 mcg/kg/hr, Intravenous, Continuous  lactated ringers infusion, , Intravenous, Continuous  fentaNYL (SUBLIMAZE) injection 25 mcg, 25 mcg, Intravenous, Q1H PRN  chlorhexidine (PERIDEX) 0.12 % solution 15 mL, 15 mL, Mouth/Throat, BID  famotidine (PEPCID) injection 20 mg, 20 mg, Intravenous, BID  insulin lispro (HUMALOG) injection vial 0-6 Units, 0-6 Units, Subcutaneous, 4 times per day  magnesium replacement protocol, , Other, RX Placeholder  calcium replacement protocol, , Other, RX Placeholder  potassium chloride 10 mEq/100 mL IVPB (Peripheral Line), 10 mEq, Intravenous, PRN  potassium chloride 20 mEq/50 mL IVPB (Central Line), 20 mEq, Intravenous, PRN  potassium chloride (KLOR-CON M) extended release tablet 40 mEq, 40 mEq, Oral, PRN **OR** potassium bicarb-citric acid (EFFER-K) effervescent tablet 40 mEq, 40 mEq, Oral, PRN **OR** potassium chloride 10 mEq/100 mL IVPB (Peripheral Line), 10 mEq, Intravenous, PRN  glucose (GLUTOSE) 40 % oral gel 15 g, 15 g, Oral, PRN  dextrose 50 % IV solution, 12.5 g, Intravenous, PRN  glucagon (rDNA) injection 1 mg, 1 mg, Intramuscular, PRN  dextrose 5 % solution, 100 mL/hr, Intravenous, PRN  norepinephrine (LEVOPHED) 16 mg in dextrose 5% 250 mL infusion, 2 mcg/min, Intravenous, Continuous  fentaNYL (SUBLIMAZE) 500 mcg in sodium chloride 0.9 % 100 mL, 25 mcg/hr, Intravenous, Continuous  sodium chloride flush 0.9 % injection 10 mL, 10 mL, Intravenous, 2 times per day  sodium chloride flush 0.9 % injection 10 mL, 10 mL, Intravenous, PRN  promethazine (PHENERGAN) tablet 12.5 mg, 12.5 mg, Oral, Q6H PRN **OR** ondansetron (ZOFRAN) injection 4 mg, 4 mg, Intravenous, Q6H PRN  enoxaparin (LOVENOX) injection 40 mg, 40 mg, Subcutaneous, Daily  cefepime (MAXIPIME) 2 g IVPB minibag, 2 g, Intravenous, Q12H  metronidazole (FLAGYL) 500 mg in NaCl 100 mL IVPB premix, 500 mg, Intravenous, Q8H    Physical:   VITALS:  /73   Pulse 113   Temp 99 °F (37.2 °C) (Bladder)   Resp 17   Ht 5' 8\" (1.727 m)   Wt 214 lb 11.7 oz (97.4 kg)   SpO2 91%   BMI 32.65 kg/m²   CURRENT TEMPERATURE:  Temp: 99 °F (37.2 °C)  TEMPERATURE RANGE OVER 24HRS:   Temp  Av.3 °F (37.9 °C)  Min: 97.9 °F (36.6 °C)  Max: 101.3 °F (38.5 °C)  24HR RESPIRATORY RATE RANGE:  Resp  Av.1  Min: 10  Max: 25  24HR PULSE RANGE: Pulse  Av.7  Min: 88  Max: 116  24HR BLOOD PRESSURE RANGE:  Systolic (47LRH), XAM:949 , Min:66 , PKW:883   ; Diastolic (49XPN), NHH:02, Min:47, Max:109    24HR INTAKE/OUTPUT:      Intake/Output Summary (Last 24 hours) at 8/10/2020 1701  Last data filed at 8/10/2020 1400  Gross per 24 hour   Intake 3221.95 ml   Output 875 ml   Net 2346.95 ml     VENT SETTINGS:    Vent Information  $Ventilation: $Subsequent Day  Skin Assessment: Clean, dry, & intact  Suction Catheter Diameter: 14  Equipment ID: [id number]  Vent Type: [id number] Henry  Vent Mode: PCV+  Vt Ordered: 0 mL  Pressure Ordered: 18(pcontrol 18)  Rate Set: 12 bmp  Peak Flow: 30.9 L/min  Pressure Support: 12 cmH20  FiO2 : 30 %  SpO2: 91 %  SpO2/FiO2 ratio: 303.33  Sensitivity: 3  PEEP/CPAP: 8(found at peep of 8)  I Time/ I Time %: 1 s  Humidification Source: HME  Nitric Oxide/Epoprostenol In Use?: No  Additional Respiratory  Assessments  Pulse: 113  Resp: 17  SpO2: 91 %  End Tidal CO2: 59 (%)  Position: [place]'s  Humidification Source: HME  Oral Care: Mouth swabbed, Mouth suctioned  Subglottic Suction Done?: No  Cuff Pressure (cm H2O): 21 cm H2O      LUNGS: Clear  To auscultation bilat. and with good air exchange  CARDIOVASCULAR:  Normal apical impulse, regular rate and rhythm  ABDOMEN:  Softly distended. Hypoactive BS dressing with minimal drainage. Dressing changed  MUSCULOSKELETAL:  there is no redness, warmth, or swelling of the joints    Data:  CBC:   Lab Results   Component Value Date    WBC 6.9 08/10/2020    RBC 3.22 08/10/2020    HGB 9.6 08/10/2020    HCT 32.3 08/10/2020    .3 08/10/2020    RDW 13.2 03/05/2018     08/10/2020     BMP:    Lab Results   Component Value Date     08/10/2020    K 4.3 08/10/2020    K 3.5 08/08/2020     08/10/2020    CO2 25 08/10/2020    BUN 26 08/10/2020    CREATININE 0.6 08/10/2020    CALCIUM 7.3 08/10/2020    LABGLOM >90 08/10/2020    GLUCOSE 106 08/10/2020     Urine Culture:  neg  Blood Culture: neg    Sputum Culture:  No components found for: CSPUTUM  CVP Mean:      ICU guidelines/prophylaxis active for the following:    insulin guidelines, DVT prophylaxis and ulcer prophylaxis    ASSESSMENT & PLAN:    Active Problems:  Day 2 S/P exp lap for TOA. ID -Continue metronidazole and Maxipime. No positive cultures at this point. WBC 6.9 and procalcitonin down to 10 from 17. T max 101.3  Bowel function - expected ileus some BS today. KUB - no sign of obstruction  Adequate Urine output  Vent per ICU team - Pt not yet ready for extubation. Added dexamethasone today.     Appreciate ICU team care  Ken De La O 8/10/2020 5:01 PM

## 2020-08-10 NOTE — FLOWSHEET NOTE
[address] THERAPY MISSED TREATMENT NOTE  [facility] ICU 4D  4D-08/008-A      Date: 8/10/2020  Patient Name: Bismark Alfaro        CSN: [id number]   : 1960  (62 y.o.)  Gender: female                REASON FOR MISSED TREATMENT: Hold Treatment per Nursing. Pt currently intubated, not appropriate for early mobility at this time.  Will check back as able

## 2020-08-10 NOTE — PROGRESS NOTES
0730 report from current nurse. Bedside rounds completed. 0745 Assessment completed. Pt opens eyes to voice follows simple commands. Nods yes and no.  0900 noted bp low restarted levo. 1100   In to see pt examined pt,changed abd dressing iodoform packing removed. Dry sterile dressing applied. Placed skin protector waffers on either side of incision to adhere dressing to protect skin. 1200 pt repositioned. Assessment completed. Pt nods she is comfortable.

## 2020-08-10 NOTE — FLOWSHEET NOTE
[facility]G [facility], [address] per Justina Duty, RT. Orders received.

## 2020-08-10 NOTE — PLAN OF CARE
Ongoing  Note: Remains on ventilator. Continue to monitor levels of oxygenation. Problem: Pain:  Goal: Pain level will decrease  Description: Pain level will decrease  Outcome: Ongoing  Note: Pt c/o chronic back pain. Fentanyl gtt per orders. Continue to monitor CPOT. Problem: Skin Integrity - Impaired:  Goal: Will show no infection signs and symptoms  Description: Will show no infection signs and symptoms  Outcome: Ongoing  Note: Pt is turned and repositioned every 2 hours and as needed. Arms and heels elevated on pillows. Continue to monitor. Problem: Infection - Central Venous Catheter-Associated Bloodstream Infection:  Goal: Will show no infection signs and symptoms  Description: Will show no infection signs and symptoms  Outcome: Ongoing  Note: No signs of infection observed. Dressing remains clean, dry and intact. Continue to monitor. Problem: Infection - Surgical Site:  Goal: Will show no infection signs and symptoms  Description: Will show no infection signs and symptoms  Outcome: Ongoing  Note: No signs of infection observed. Dressing remains clean, dry and intact. Continue to monitor. Problem: Urinary Elimination:  Goal: Signs and symptoms of infection will decrease  Description: Signs and symptoms of infection will decrease  Outcome: Ongoing  Note: Bhat care completed this shift. No signs of infection observed. Continue to monitor. Problem: Infection - Ventilator-Associated Pneumonia:  Goal: Absence of pulmonary infection  Description: Absence of pulmonary infection  Outcome: Ongoing  Note: Continue to monitor for signs of infection. Problem: Skin Integrity:  Goal: Will show no infection signs and symptoms  Description: Will show no infection signs and symptoms  Outcome: Ongoing  Note: No signs of infection observed. Dressing remains clean, dry and intact. Continue to monitor.       Problem: Restraint Use - Nonviolent/Non-Self-Destructive Behavior:  Goal: Absence of restraint indications  Description: Absence of restraint indications  Outcome: Completed  Goal: Absence of restraint-related injury  Description: Absence of restraint-related injury  Outcome: Completed   Care plan reviewed with patient. Patient demonstrates understanding of the plan of care and contributes to goal setting.

## 2020-08-11 LAB
ANION GAP SERPL CALCULATED.3IONS-SCNC: 5 MEQ/L (ref 8–16)
BUN BLDV-MCNC: 28 MG/DL (ref 7–22)
CALCIUM IONIZED: 1.09 MMOL/L (ref 1.12–1.32)
CALCIUM SERPL-MCNC: 7.5 MG/DL (ref 8.5–10.5)
CHLORIDE BLD-SCNC: 107 MEQ/L (ref 98–111)
CO2: 29 MEQ/L (ref 23–33)
CREAT SERPL-MCNC: 0.5 MG/DL (ref 0.4–1.2)
ERYTHROCYTE [DISTWIDTH] IN BLOOD BY AUTOMATED COUNT: 13.7 % (ref 11.5–14.5)
ERYTHROCYTE [DISTWIDTH] IN BLOOD BY AUTOMATED COUNT: 50.9 FL (ref 35–45)
GFR SERPL CREATININE-BSD FRML MDRD: > 90 ML/MIN/1.73M2
GLUCOSE BLD-MCNC: 128 MG/DL (ref 70–108)
GLUCOSE BLD-MCNC: 141 MG/DL (ref 70–108)
GLUCOSE BLD-MCNC: 141 MG/DL (ref 70–108)
GLUCOSE BLD-MCNC: 148 MG/DL (ref 70–108)
HCT VFR BLD CALC: 32 % (ref 37–47)
HEMOGLOBIN: 9.5 GM/DL (ref 12–16)
MAGNESIUM: 2.2 MG/DL (ref 1.6–2.4)
MCH RBC QN AUTO: 30.2 PG (ref 26–33)
MCHC RBC AUTO-ENTMCNC: 29.7 GM/DL (ref 32.2–35.5)
MCV RBC AUTO: 101.6 FL (ref 81–99)
PHOSPHORUS: 2 MG/DL (ref 2.4–4.7)
PLATELET # BLD: 214 THOU/MM3 (ref 130–400)
PMV BLD AUTO: 10.6 FL (ref 9.4–12.4)
POTASSIUM SERPL-SCNC: 4.6 MEQ/L (ref 3.5–5.2)
RBC # BLD: 3.15 MILL/MM3 (ref 4.2–5.4)
SODIUM BLD-SCNC: 141 MEQ/L (ref 135–145)
WBC # BLD: 8.6 THOU/MM3 (ref 4.8–10.8)

## 2020-08-11 PROCEDURE — 94003 VENT MGMT INPAT SUBQ DAY: CPT

## 2020-08-11 PROCEDURE — 2500000003 HC RX 250 WO HCPCS: Performed by: NURSE PRACTITIONER

## 2020-08-11 PROCEDURE — 36415 COLL VENOUS BLD VENIPUNCTURE: CPT

## 2020-08-11 PROCEDURE — 84100 ASSAY OF PHOSPHORUS: CPT

## 2020-08-11 PROCEDURE — 94770 HC ETCO2 MONITOR DAILY: CPT

## 2020-08-11 PROCEDURE — 6360000002 HC RX W HCPCS: Performed by: NURSE PRACTITIONER

## 2020-08-11 PROCEDURE — 83735 ASSAY OF MAGNESIUM: CPT

## 2020-08-11 PROCEDURE — 99024 POSTOP FOLLOW-UP VISIT: CPT | Performed by: SURGERY

## 2020-08-11 PROCEDURE — 6370000000 HC RX 637 (ALT 250 FOR IP): Performed by: NURSE PRACTITIONER

## 2020-08-11 PROCEDURE — 2580000003 HC RX 258: Performed by: OBSTETRICS & GYNECOLOGY

## 2020-08-11 PROCEDURE — 99291 CRITICAL CARE FIRST HOUR: CPT | Performed by: INTERNAL MEDICINE

## 2020-08-11 PROCEDURE — 82330 ASSAY OF CALCIUM: CPT

## 2020-08-11 PROCEDURE — 2000000000 HC ICU R&B

## 2020-08-11 PROCEDURE — 2580000003 HC RX 258: Performed by: NURSE PRACTITIONER

## 2020-08-11 PROCEDURE — 6360000002 HC RX W HCPCS: Performed by: OBSTETRICS & GYNECOLOGY

## 2020-08-11 PROCEDURE — 82948 REAGENT STRIP/BLOOD GLUCOSE: CPT

## 2020-08-11 PROCEDURE — 80048 BASIC METABOLIC PNL TOTAL CA: CPT

## 2020-08-11 PROCEDURE — 85027 COMPLETE CBC AUTOMATED: CPT

## 2020-08-11 PROCEDURE — APPSS45 APP SPLIT SHARED TIME 31-45 MINUTES: Performed by: NURSE PRACTITIONER

## 2020-08-11 PROCEDURE — 2500000003 HC RX 250 WO HCPCS: Performed by: OBSTETRICS & GYNECOLOGY

## 2020-08-11 PROCEDURE — 94761 N-INVAS EAR/PLS OXIMETRY MLT: CPT

## 2020-08-11 RX ORDER — FENTANYL CITRATE 50 UG/ML
25 INJECTION, SOLUTION INTRAMUSCULAR; INTRAVENOUS EVERY 4 HOURS PRN
Status: DISCONTINUED | OUTPATIENT
Start: 2020-08-11 | End: 2020-08-18 | Stop reason: HOSPADM

## 2020-08-11 RX ORDER — FUROSEMIDE 10 MG/ML
40 INJECTION INTRAMUSCULAR; INTRAVENOUS ONCE
Status: COMPLETED | OUTPATIENT
Start: 2020-08-11 | End: 2020-08-11

## 2020-08-11 RX ORDER — BUMETANIDE 0.25 MG/ML
1 INJECTION, SOLUTION INTRAMUSCULAR; INTRAVENOUS ONCE
Status: DISCONTINUED | OUTPATIENT
Start: 2020-08-11 | End: 2020-08-11 | Stop reason: CLARIF

## 2020-08-11 RX ORDER — HYDROCODONE BITARTRATE AND ACETAMINOPHEN 7.5; 325 MG/1; MG/1
1 TABLET ORAL EVERY 6 HOURS PRN
Status: DISCONTINUED | OUTPATIENT
Start: 2020-08-11 | End: 2020-08-18 | Stop reason: HOSPADM

## 2020-08-11 RX ADMIN — FAMOTIDINE 20 MG: 10 INJECTION, SOLUTION INTRAVENOUS at 08:19

## 2020-08-11 RX ADMIN — CEFEPIME 2 G: 2 INJECTION, POWDER, FOR SOLUTION INTRAMUSCULAR; INTRAVENOUS at 13:50

## 2020-08-11 RX ADMIN — POTASSIUM PHOSPHATE, MONOBASIC AND POTASSIUM PHOSPHATE, DIBASIC 15 MMOL: 224; 236 INJECTION, SOLUTION, CONCENTRATE INTRAVENOUS at 08:01

## 2020-08-11 RX ADMIN — FAMOTIDINE 20 MG: 10 INJECTION, SOLUTION INTRAVENOUS at 20:16

## 2020-08-11 RX ADMIN — HYDROCODONE BITARTRATE AND ACETAMINOPHEN 1 TABLET: 7.5; 325 TABLET ORAL at 18:07

## 2020-08-11 RX ADMIN — FENTANYL CITRATE 25 MCG: 50 INJECTION INTRAMUSCULAR; INTRAVENOUS at 22:35

## 2020-08-11 RX ADMIN — SODIUM CHLORIDE, PRESERVATIVE FREE 10 ML: 5 INJECTION INTRAVENOUS at 08:20

## 2020-08-11 RX ADMIN — METRONIDAZOLE 500 MG: 500 INJECTION, SOLUTION INTRAVENOUS at 08:01

## 2020-08-11 RX ADMIN — ENOXAPARIN SODIUM 40 MG: 40 INJECTION SUBCUTANEOUS at 20:16

## 2020-08-11 RX ADMIN — FENTANYL CITRATE 25 MCG: 50 INJECTION INTRAMUSCULAR; INTRAVENOUS at 14:16

## 2020-08-11 RX ADMIN — METRONIDAZOLE 500 MG: 500 INJECTION, SOLUTION INTRAVENOUS at 16:17

## 2020-08-11 RX ADMIN — FUROSEMIDE 40 MG: 10 INJECTION, SOLUTION INTRAMUSCULAR; INTRAVENOUS at 08:19

## 2020-08-11 RX ADMIN — Medication 75 MCG/HR: at 01:48

## 2020-08-11 RX ADMIN — INSULIN LISPRO 1 UNITS: 100 INJECTION, SOLUTION INTRAVENOUS; SUBCUTANEOUS at 18:53

## 2020-08-11 RX ADMIN — DEXAMETHASONE SODIUM PHOSPHATE 4 MG: 4 INJECTION, SOLUTION INTRAMUSCULAR; INTRAVENOUS at 08:19

## 2020-08-11 RX ADMIN — SODIUM CHLORIDE, PRESERVATIVE FREE 10 ML: 5 INJECTION INTRAVENOUS at 20:16

## 2020-08-11 RX ADMIN — CEFEPIME 2 G: 2 INJECTION, POWDER, FOR SOLUTION INTRAMUSCULAR; INTRAVENOUS at 01:52

## 2020-08-11 RX ADMIN — FENTANYL CITRATE 25 MCG: 50 INJECTION INTRAMUSCULAR; INTRAVENOUS at 04:28

## 2020-08-11 ASSESSMENT — PAIN SCALES - GENERAL
PAINLEVEL_OUTOF10: 8
PAINLEVEL_OUTOF10: 8
PAINLEVEL_OUTOF10: 7

## 2020-08-11 ASSESSMENT — PAIN DESCRIPTION - ORIENTATION: ORIENTATION: MID

## 2020-08-11 ASSESSMENT — PAIN DESCRIPTION - LOCATION: LOCATION: ABDOMEN

## 2020-08-11 ASSESSMENT — PAIN DESCRIPTION - PROGRESSION: CLINICAL_PROGRESSION: NOT CHANGED

## 2020-08-11 ASSESSMENT — PAIN DESCRIPTION - FREQUENCY: FREQUENCY: CONTINUOUS

## 2020-08-11 ASSESSMENT — PULMONARY FUNCTION TESTS
PIF_VALUE: 20
PIF_VALUE: 22

## 2020-08-11 ASSESSMENT — PAIN - FUNCTIONAL ASSESSMENT: PAIN_FUNCTIONAL_ASSESSMENT: ACTIVITIES ARE NOT PREVENTED

## 2020-08-11 ASSESSMENT — PAIN DESCRIPTION - DESCRIPTORS: DESCRIPTORS: ACHING;CONSTANT

## 2020-08-11 ASSESSMENT — PAIN DESCRIPTION - PAIN TYPE: TYPE: ACUTE PAIN

## 2020-08-11 ASSESSMENT — PAIN DESCRIPTION - ONSET: ONSET: ON-GOING

## 2020-08-11 NOTE — PROGRESS NOTES
CRITICAL CARE PROGRESS NOTE      Patient:  Yamini Seattle    Unit/Bed:4D-08/008-A  YOB: 1960  MRN: [id number]   Acct: [de-identified]   PCP: Kat Clark MD  Date of Admission: 8/7/2020  Chief Complaint:-Abdominal pain/hypotension    Assessment and Plan:    1. Acute Postoperative Pulmonary Insufficiency: Patient intubated for surgical procedure. Impaired spontaneous breathing trial secondary to oversedation-wean and plan to extubate. 2. Septic Shock: Abdominal source, large left adnexal abscess. S/P fluid resuscitation. IV norepinephrine discontinued on 8/11/2020. PCT improving 8/10/2020.  3. Severe Pneumonia: Left lower lobe, community aquired present on admission. Pseudomonas aeruginosa positive on PCR. Continue IV cefepime day 3. Continue decadron. 4. Large Left Adnexal Abscess: S/P Exploratory laparotomy with left salpingo oophorectomy, small bowel exploration, and irrigation of purulent material on 8/8/2020 for ovarian abscess per Dr. London Poon. Continue IV cefepime and Flagyl, day 3.   5. Hypervolemia: Patient is 12 L positive since admission. One-time dose Lasix IV. 6. Normocytic Anemia:  (Stable)  No signs of active bleeding. 7. Impaired Glucose Tolerance:  (Resolved)    8. GERD:  Continue PPI. 9. Obesity:  BMI 32.65. Weight loss education when appropriate. INITIAL H AND P AND ICU COURSE:    (Per chart review as patient heavily sedated on mechanical ventilation)  Vijay Dexter is a 62year old  female transferred to [facility] ICU 8/8/2020 with hypotension and abdominal pain.      Patient has a significant history of former smoker-quit 2019, COPD, impaired glucose tolerance, GERD     Jaelyn Perrin presented to [facility] ER with complaint of abdominal pain, fever, vaginal bleeding. Patient identifies onset vaginal bleeding with passage of clots, averaging 3-4 pads/day. Onset proximately 2 weeks ago.   Patient also identifies a fever of 102.9 that started approximately 2 days ago with complaints of lower abdominal pain. She states she noted a mass coming out of her vagina and tried to pull it out unsuccessfully secondary to pain. 8/7/2020 transvaginal ultrasound noted large complex lesion in the left ovary could relate to abscess or hemorrhagic neoplasm. CT abdomen pelvis with IV contrast mild groundglass airspace opacities within the lingula suggest pneumonia. Mild fatty infiltration of liver, multiple gallstones biliary. A spiculated cystic mass within the left pelvis. Large pelvic mass which is mildly inflamed likely related to the left ovary. Evaluation occurred by OB/GYN Dr. Chinmay Medina, an Endocervical polyp was removed and sent for pathology. Patient was sent to [facility] ICU stepdown. 8/8/2020 I was called to evaluate patient secondary to hypotension. Patient was not responsive to 2 L of 0.9 normal saline systolic blood pressure remained 68-74 with a heart rate of 120. Levophed drip was started and patient was admitted to the ICU for further evaluation and care. 8/8/2020: Exploratory laparotomy with left salpingo oophorectomy, small bowel exploration, and irrigation of purulent material on 8/8/2020 for ovarian abscess per Dr. Lin Minaya. Return to intensive care on mechanical ventilation. Subjective:- (Last 24 hours)    Patient on mechanical ventilation and able to nod yes/no to questions asked. Denies chest pain, dyspnea, cough, & nausea. Nods yes to abdominal pain. Past medical history, family history, social history and allergies reviewed again and is unchanged since admission. ROS (12 point review of systems completed. Pertinent positives noted.  Otherwise ROS is negative)      Scheduled Meds:   potassium phosphate IVPB  15 mmol Intravenous Once    phosphorus replacement protocol   Other RX Placeholder    dexamethasone  4 mg Intravenous Daily    chlorhexidine  15 mL Mouth/Throat BID    famotidine (PEPCID) injection  20 mg Intravenous BID    insulin lispro  0-6 Units Subcutaneous 4 times per day    magnesium replacement protocol   Other RX Placeholder    calcium replacement protocol   Other RX Placeholder    sodium chloride flush  10 mL Intravenous 2 times per day    enoxaparin  40 mg Subcutaneous Daily    cefepime  2 g Intravenous Q12H    metroNIDAZOLE  500 mg Intravenous Q8H     Continuous Infusions:   dexmedetomidine 0.3 mcg/kg/hr (08/10/20 2216)    lactated ringers 100 mL/hr at 08/10/20 2100    dextrose      norepinephrine Stopped (08/10/20 2234)    fentaNYL (SUBLIMAZE) 500 mcg in sodium chloride 0.9 % 100 mL 75 mcg/hr (08/11/20 0148)     PRN Meds:.acetaminophen **OR** acetaminophen, fentanNYL, potassium chloride, potassium chloride, potassium chloride **OR** potassium alternative oral replacement **OR** potassium chloride, glucose, dextrose, glucagon (rDNA), dextrose, sodium chloride flush, promethazine **OR** ondansetron     PHYSICAL EXAMINATION:  Vital signs: Temperature 98.6, respirations 13, pulse 112, blood pressure 116/62, pulse ox 92% per 30% FiO2 on mechanical ventilation. Ventilator settings: CPAP 10/6, tidal volume 544, FIO2 30%. Intake: 3417 mL's/Output:  1475 mL's. Net +12. .7 L  Wt Readings from Last 3 Encounters:   08/11/20 221 lb 5.5 oz (100.4 kg)   07/09/20 205 lb 14.4 oz (93.4 kg)   11/07/19 209 lb 12.8 oz (95.2 kg)      Body mass index is 33.65 kg/m². CAM-ICU:   Sedated-arouses. General: Acutely ill-appearing obese  female resting in bed on mechanical ventilation. HEENT:  normocephalic and atraumatic. No scleral icterus. PERR  Neck: supple. No thyromegaly. Lungs: clear to auscultation, diminished right base. No retractions. Intermittent bradypnea. Cardiac: RRR. No JVD. Abdomen: soft, distended. Nontender. Hypoactive bowel sounds left upper quadrant. Surgical dressing dry and intact without shadow. Extremities:  No clubbing, cyanosis.   Trace pretibial nonpitting edema & generalized upper extremity edema. Vasculature: capillary refill < 3 seconds. Palpable dorsalis pedis pulses, 2+. Skin:  Pale, warm, and dry. Psych:  Confused. Lymph:  No supraclavicular adenopathy. Neurologic:  No focal deficit. No seizures. ICU PROPHYLAXIS/THERAPY:   Stress ulcer:  [] PPI Agent  [x] H2RA [] Sucralfate [] Other:   VTE:     [x] Enoxaparin    [] Warfarin [] NOAC [] PCD Device:Bilat LE   [] Heparin: [] Subcut / [] IV     DATA :- LABS, RADIOLOGY- All radiographs, tracings, PFTs, and imaging are personally viewed and interpreted unless otherwise noted).  Telemetry: Sinus tachycardia rate 112.  Sodium 143, potassium 4.6, chloride 107, CO2 29, BUN 28, creatinine 0.5, anion gap 5.0, ionized calcium 1.09, magnesium 2.2, glucose 141, phosphorus 2.0.   WBC 8.6, hemoglobin 9.5, hematocrit 32.0, .6, platelet count 854.  Peritoneal fluid culture demonstrates many segmented neutrophils; many gram-negative bacilli.  Molecular pneumonia panel demonstrates Pseudomonas aeruginosa by PCR. Electronically signed by KYLE Lovett CNP on 8/11/2020 at 7:17 AM  CRITICAL CARE SPECIALIST  Patient seen by me. Case discussed with NP. I personally weaned patient. Patient extubated. Patient with Pseudomonas pneumonia. On Cefepime. CC time 30 minutes. Time does not include NP assessment. Electronically signed by Shanna Ibarra MD.

## 2020-08-11 NOTE — CARE COORDINATION
8/11/20, 12:30 PM EDT    DISCHARGE ON GOING [facility] day: 4  Location: -08/008-A Reason for admit: Ovarian mass [N83.8]  Ovarian mass [N83.8]   Procedure:   8/7 CT Abd/pelvis: Lingular pneumonia; cholelithiasis; Large pelvic mass which is mildly inflamed likely relating to the left ovary. Benign and malignant etiologies are possible  8/7 US Abd/pelvis: Large complex lesion in the left ovary could relate to abscess or hemorrhagic neoplasm either benign or malignant. Lack of significant ascites mitigates against malignancy  8/7 Transvaginal US: Large pelvic mass which is mildly inflamed likely relating to the left ovary. Benign and malignant etiologies are possible  8/8 LAPAROTOMY EXPLORATORY, LEFT SALPINGO OOPHORECTOMY, SMALL BOWEL EXPLORATION, irrigation of purulent material  8/8 Intubated  8/8 CVC RIJ  8/10 KUB: Non obstructive bowel gas pattern; no evidence of ileus  8/11 Extubated    Treatment Plan of Care: POD #3. Levo off last evening. Extubated this morning. Sats 93% on 2L O2. Started on clear liquid diet today. Tmax 100.4. 's. Dietitian consulted. PT/OT. Respiratory culture by PCR +pseudomonas aeruginosa. COVID 19 was negative. Cardiac monitoring, I&O, daily weight, wound care, SCDs, olivera care. IVF, IV cefepime, IV decadron 4 mg daily, lovenox, pepcid, prn IV fentanyl, SSI Q6H, IV flagyl, Electrolyte replacement protocols. Received IV lasix 40 mg x1 yesterday. Phos 2, hgb 9.5. Barriers to Discharge: not medically ready  PCP: Paddy Hamilton MD  Readmission Risk Score: 15%  Patient Goals/Plan/Treatment Preferences: Spoke with Maru Parker; states she lives at home alone and did not use any DME or have any HH services PTA. She drives, cares for herself independently, and has a PCP. Maru Parker states she plans to return home at discharge, denies needs, and is agreeable to [facility] at discharge for RN. She declines [facility] list stating she would prefer to use [facility] HH.  She denies any

## 2020-08-11 NOTE — PROGRESS NOTES
MD [address]ISELL[address] DR GENERAL SURGERY  General Surgery Postoperative Progress Note    Pt Name: Yamini Lau  Medical Record Number: [id number]  Date of Birth 1960   Today's Date: 8/11/2020  ASSESSMENT   1. POD # 3 ex lap, abdominal washout , left salpingo-oophorectomy wit adnexal abscess excision performed by Dr. Melony Schneider with gynecology  2. Sepsis shock  present at time of admission  3. Purulent peritonitis present at time of surgery  4. Left adnexal abscess - path :Ovarian cyst, right, resection:    Cyst formation with abscess and inflamed partly ciliated epithelium.       Ovary with hemorrhage. has a past medical history of Allergic rhinitis, Chronic back pain, Headache(784.0), and Panlobular emphysema ([address] PLAN     1. Continue broad-spectrum antibiotics, waiting for final culture results  2. NG pulled overnight, can leave out, replace if deveops nausea/vomiting. Trial of clears today   3. Continue pain control  4.  Continue local wound care with daily packing changes   SUBJECTIVE   Jaelyn Perrin  Is extubated, pain is controlled  CURRENT MEDICATIONS   Scheduled Meds:   phosphorus replacement protocol   Other RX Placeholder    dexamethasone  4 mg Intravenous Daily    chlorhexidine  15 mL Mouth/Throat BID    famotidine (PEPCID) injection  20 mg Intravenous BID    insulin lispro  0-6 Units Subcutaneous 4 times per day    magnesium replacement protocol   Other RX Placeholder    calcium replacement protocol   Other RX Placeholder    sodium chloride flush  10 mL Intravenous 2 times per day    enoxaparin  40 mg Subcutaneous Daily    cefepime  2 g Intravenous Q12H    metroNIDAZOLE  500 mg Intravenous Q8H     Continuous Infusions:   lactated ringers 30 mL/hr at 08/11/20 0743    dextrose      norepinephrine Stopped (08/10/20 2234)     PRN Meds:.acetaminophen **OR** acetaminophen, fentanNYL, potassium chloride, potassium chloride, potassium chloride **OR** potassium alternative oral replacement **OR** potassium chloride, glucose, dextrose, glucagon (rDNA), dextrose, sodium chloride flush, promethazine **OR** ondansetron  OBJECTIVE   CURRENT VITALS:  height is 5' 8\" (1.727 m) and weight is 221 lb 5.5 oz (100.4 kg). Her bladder temperature is 98.8 °F (37.1 °C). Her blood pressure is 139/68 and her pulse is 108. Her respiration is 10 and oxygen saturation is 93%. Temperature Range (24h):Temp: 98.8 °F (37.1 °C) Temp  Av.4 °F (37.4 °C)  Min: 98.6 °F (37 °C)  Max: 100.4 °F (38 °C)  BP Range (28S): Systolic (91WAS), RWN:509 , Min:91 , IGB:176     Diastolic (44KRP), GWZ:01, Min:54, Max:82    Pulse Range (24h): Pulse  Av  Min: 89  Max: 122  Respiration Range (24h): Resp  Av.9  Min: 10  Max: 20  Current Pulse Ox (24h):  SpO2: 93 %  Pulse Ox Range (24h):  SpO2  Av.9 %  Min: 90 %  Max: 97 %  Oxygen Amount and Delivery: O2 Flow Rate (L/min): 2 L/min  Incentive Spirometry Tx:            Extubated, interactive  Abd soft, non distended, incision  Packed with Serous  Drainage on gauze. Serous drainage no russel pus  Moves all extremities, no peripheral edema     In: .6 [I.V.:3.6]  Out: 1125 [Urine:925]     Date 20 - 20   Shift 1662-5009 2671-1321 0102-7621 24 Hour Total   INTAKE   P.O.(mL/kg/hr) 0(0)   0   I. V.(mL/kg) 1153. 6(11.5)   1153. 6(11.5)   Shift Total(mL/kg) 1153. 6(11.5)   1153. 6(11.5)   OUTPUT   Urine(mL/kg/hr) 450(0.6)   450   Emesis/NG output(mL/kg) 100(1)   100(1)   Other(mL/kg) 0(0)   0(0)   Stool(mL/kg) 0(0)   0(0)   Blood(mL/kg) 0(0)   0(0)   Shift Total(mL/kg) 550(5.5)   550(5.5)   Weight (kg) 100.4 100.4 100.4 100.4     LABS     Recent Labs     20  0539 20  1040 20  1130 20  1630 08/10/20  0320 20  0437   WBC 7.4  --   --   --  6.9 8.6   HGB 9.9* 7.0* 9.5*  --  9.6* 9.5*   HCT 31.9* 22.8* 31.3*  --  32.3* 32.0*     --   --   --  206 214    140  --   --  143 141   K 3.5 3.3*  --  3.8 4.3 4.6    acute versus chronic. See discussion above and correlate clinically. **This report has been created using voice recognition software. It may contain minor errors which are inherent in voice recognition technology. **      Final report electronically signed by Dr. Gunner Crocker on 8/8/2020 5:53 AM      XR CHEST 1 VIEW   Final Result   No confluent infiltrate. PA and lateral radiographs could be performed if indicated clinically. **This report has been created using voice recognition software. It may contain minor errors which are inherent in voice recognition technology. **      Final report electronically signed by Dr. Gilbert Ny on 8/7/2020 1:30 PM      US NON OB TRANSVAGINAL   Final Result      Large complex lesion in the left ovary could relate to abscess or hemorrhagic neoplasm either benign or malignant. Lack of significant ascites mitigates against malignancy. **This report has been created using voice recognition software. It may contain minor errors which are inherent in voice recognition technology. **      Final report electronically signed by Dr. Gilbert Ny on 8/7/2020 1:44 PM      US DUP ABD PEL RETRO SCROT COMPLETE   Final Result      Large complex lesion in the left ovary could relate to abscess or hemorrhagic neoplasm either benign or malignant. Lack of significant ascites mitigates against malignancy. **This report has been created using voice recognition software. It may contain minor errors which are inherent in voice recognition technology. **      Final report electronically signed by Dr. Gilbert Ny on 8/7/2020 1:44 PM      CT ABDOMEN PELVIS W IV CONTRAST Additional Contrast? None   Final Result   Lingular pneumonia. Cholelithiasis. Large pelvic mass which is mildly inflamed likely relating to the left ovary. Benign and malignant etiologies are possible. **This report has been created using voice recognition software. It may contain minor errors which are inherent in voice recognition technology. **      Final report electronically signed by Dr. Andrei Cross on 8/7/2020 1:23 PM          Electronically signed by Beatris Munoz MD on 8/11/2020 at 12:37 PM

## 2020-08-11 NOTE — PLAN OF CARE
Problem: Impaired respiratory status  Goal: Normal spontaneous ventilation  8/11/2020 1316 by Ori Lewis RCP  Outcome: Completed  Note: Patient extubated

## 2020-08-11 NOTE — PROGRESS NOTES
Intensive Care Unit  Ob/Gyn  Attending Progress Note          SUBJECTIVE:  Events of the last 24 hours. Extubated this am. Answers questions appropriately. Does not remember meeting me prior nor that her son has been here. Reports no pain.     OBJECTIVE:    Current Medications:  Current Facility-Administered Medications: potassium phosphate 15 mmol in dextrose 5 % 250 mL IVPB, 15 mmol, Intravenous, Once  acetaminophen (TYLENOL) tablet 650 mg, 650 mg, Oral, Q4H PRN **OR** acetaminophen (TYLENOL) suppository 650 mg, 650 mg, Rectal, Q4H PRN  phosphorus replacement protocol, , Other, RX Placeholder  Dexamethasone Sodium Phosphate injection 4 mg, 4 mg, Intravenous, Daily  lactated ringers infusion, , Intravenous, Continuous  fentaNYL (SUBLIMAZE) injection 25 mcg, 25 mcg, Intravenous, Q1H PRN  chlorhexidine (PERIDEX) 0.12 % solution 15 mL, 15 mL, Mouth/Throat, BID  famotidine (PEPCID) injection 20 mg, 20 mg, Intravenous, BID  insulin lispro (HUMALOG) injection vial 0-6 Units, 0-6 Units, Subcutaneous, 4 times per day  magnesium replacement protocol, , Other, RX Placeholder  calcium replacement protocol, , Other, RX Placeholder  potassium chloride 10 mEq/100 mL IVPB (Peripheral Line), 10 mEq, Intravenous, PRN  potassium chloride 20 mEq/50 mL IVPB (Central Line), 20 mEq, Intravenous, PRN  potassium chloride (KLOR-CON M) extended release tablet 40 mEq, 40 mEq, Oral, PRN **OR** potassium bicarb-citric acid (EFFER-K) effervescent tablet 40 mEq, 40 mEq, Oral, PRN **OR** potassium chloride 10 mEq/100 mL IVPB (Peripheral Line), 10 mEq, Intravenous, PRN  glucose (GLUTOSE) 40 % oral gel 15 g, 15 g, Oral, PRN  dextrose 50 % IV solution, 12.5 g, Intravenous, PRN  glucagon (rDNA) injection 1 mg, 1 mg, Intramuscular, PRN  dextrose 5 % solution, 100 mL/hr, Intravenous, PRN  norepinephrine (LEVOPHED) 16 mg in dextrose 5% 250 mL infusion, 2 mcg/min, Intravenous, Continuous  sodium chloride flush 0.9 % injection 10 mL, 10 mL, Intravenous, 2 times per day  sodium chloride flush 0.9 % injection 10 mL, 10 mL, Intravenous, PRN  promethazine (PHENERGAN) tablet 12.5 mg, 12.5 mg, Oral, Q6H PRN **OR** ondansetron (ZOFRAN) injection 4 mg, 4 mg, Intravenous, Q6H PRN  enoxaparin (LOVENOX) injection 40 mg, 40 mg, Subcutaneous, Daily  cefepime (MAXIPIME) 2 g IVPB minibag, 2 g, Intravenous, Q12H  metronidazole (FLAGYL) 500 mg in NaCl 100 mL IVPB premix, 500 mg, Intravenous, Q8H    Physical:   VITALS:  /62   Pulse 122   Temp 98.8 °F (37.1 °C) (Bladder)   Resp 17   Ht 5' 8\" (1.727 m)   Wt 221 lb 5.5 oz (100.4 kg)   SpO2 97%   BMI 33.65 kg/m²   CURRENT TEMPERATURE:  Temp: 98.8 °F (37.1 °C)  TEMPERATURE RANGE OVER 24HRS:   Temp  Av.5 °F (37.5 °C)  Min: 98.6 °F (37 °C)  Max: 100.4 °F (38 °C)  24HR RESPIRATORY RATE RANGE:  Resp  Av  Min: 10  Max: 19  24HR PULSE RANGE: Pulse  Av.3  Min: 88  Max: 122  24HR BLOOD PRESSURE RANGE:  Systolic (16BEL), AP , Min:66 , MML:001   ; Diastolic (51BCU), YSY:92, Min:51, Max:109    24HR INTAKE/OUTPUT:      Intake/Output Summary (Last 24 hours) at 2020 0919  Last data filed at 2020 5279  Gross per 24 hour   Intake 3415.55 ml   Output 1425 ml   Net 1990.55 ml     VENT SETTINGS:    Vent Information  $Ventilation: $Subsequent Day  Skin Assessment: Clean, dry, & intact  Suction Catheter Diameter: 14  Equipment ID: [id number]  Vent Type: [id number] Henry  Vent Mode: PCV+  Vt Ordered: 0 mL  Pressure Ordered: 20(p control 12)  Rate Set: 12 bmp  Peak Flow: 28.8 L/min  Pressure Support: 12 cmH20  FiO2 : 30 %  SpO2: 97 %  SpO2/FiO2 ratio: 310  Sensitivity: 3  PEEP/CPAP: 8  I Time/ I Time %: 1 s  Humidification Source: HME  Nitric Oxide/Epoprostenol In Use?: No  Additional Respiratory  Assessments  Pulse: 122  Resp: 17  SpO2: 97 %  End Tidal CO2: 61 (%)  Position: Semi-Grove's  Humidification Source: HME  Oral Care: Mouth swabbed, Mouth suctioned  Subglottic Suction Done?: No  Cuff Pressure (cm H2O): (MOV)      LUNGS: Clear  To auscultation bilat. and with good air exchange  CARDIOVASCULAR:  Normal apical impulse, regular rate and rhythm  ABDOMEN:  Softly distended. Hypoactive BS dressing with minimal drainage. Inc. C/D/I  MUSCULOSKELETAL:  there is no redness, warmth, or swelling of the joints    Data:  CBC:   Lab Results   Component Value Date    WBC 8.6 08/11/2020    RBC 3.15 08/11/2020    HGB 9.5 08/11/2020    HCT 32.0 08/11/2020    .6 08/11/2020    RDW 13.2 03/05/2018     08/11/2020     BMP:    Lab Results   Component Value Date     08/11/2020    K 4.6 08/11/2020    K 3.5 08/08/2020     08/11/2020    CO2 29 08/11/2020    BUN 28 08/11/2020    CREATININE 0.5 08/11/2020    CALCIUM 7.5 08/11/2020    LABGLOM >90 08/11/2020    GLUCOSE 141 08/11/2020     Urine Culture:  neg  Blood Culture: neg    Sputum Culture:  No components found for: CSPUTUM  CVP Mean:      ICU guidelines/prophylaxis active for the following:    insulin guidelines, DVT prophylaxis and ulcer prophylaxis    ASSESSMENT & PLAN:    Active Problems:  Day 3 S/P exp lap for TOA. ID -Continue metronidazole and Maxipime. No positive cultures at this point. WBC 8.6. T max 100.4  Bowel function - expected ileus some BS today. KUB - no sign of obstruction yesterday  Adequate Urine output. Will allow clears and increased activity.   Bhat out if able to get to toilet    Appreciate ICU team care  Christie Monroy 8/11/2020 9:19 AM

## 2020-08-11 NOTE — CARE COORDINATION
DISCHARGE/PLANNING EVALUATION  8/11/20, 11:32 AM EDT    Reason for Referral: Arrange for [facility] due to Pneumonia dx and abdominal surgery. Mental Status: alert and oriented pta. Decision Making: makes own decisions. Family/Social/Home Environment: Assessment completed with son Des Chopra over the phone. Son states pt lives alone and is very independent. 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 Son states he agrees with pt having [facility] and feels she would want [facility] if needed. Son states he is not sure if pt will agree to having in home services though. Son states he would be willing to come from [place] to stay with pt for a few days or have pt come stay with him if she needs longer time to recuperate. Son suggests to speak with pt and encourage pt to have services at discharge or to stay with him temporarily. Current Services including food security, transportation and housekeeping:  Pt does everything. Current Equipment: Pt has a walker from prior back surgery. No other dme in the home. Payment Source:[employer].  Concerns or Barriers to Discharge: Son denies. Post acute provider list with quality measures, geographic area and applicable managed care information provided. Questions regarding selection process answered: Son declined [facility] list, states pt will wants [facility]. Teach Back Method used with son regarding care plan and discharge planning. Patients son verbalized understanding of the plan of care and contribute to goal setting. Patient goals, treatment preferences and discharge plan: SW to confirm discharge plan with pt when more alert after being extubated this morning. Home alone with [facility] vs Home with son to [place]. SW did call [facility] and made referral in the event pt decided to return home as son anticipates.      Electronically signed by IVETT Ritchie on 8/11/2020 at 11:32 AM

## 2020-08-11 NOTE — PLAN OF CARE
Problem: Pain:  Goal: Pain level will decrease  Description: Pain level will decrease  Outcome: Ongoing  Note: Pain managed with IV medications     Problem: Nutrition  Goal: Optimal nutrition therapy  Outcome: Ongoing  Note: Clear liquid diet today. She tolerates this well. Problem: Discharge Planning:  Goal: Participates in care planning  Description: Participates in care planning  Outcome: Ongoing  Note: Plan discharge to home when able. Problem: Airway Clearance - Ineffective:  Goal: Ability to maintain a clear airway will improve  Description: Ability to maintain a clear airway will improve  Outcome: Ongoing  Note: Extubated today to 2 liters NC. Problem: Anxiety/Stress:  Goal: Level of anxiety will decrease  Description: Level of anxiety will decrease  Outcome: Ongoing  Note: Does not demonstrate anxiety. Problem: Aspiration:  Goal: Absence of aspiration  Description: Absence of aspiration  Outcome: Ongoing  Note: Passed bedside swallow eval.     Problem: Bowel Function - Altered:  Goal: Bowel elimination is within specified parameters  Description: Bowel elimination is within specified parameters  Outcome: Ongoing  Note: She has adequate bowel sounds. Problem: Cardiac Output - Decreased:  Goal: Hemodynamic stability will improve  Description: Hemodynamic stability will improve  Outcome: Ongoing  Note: She remains hemodynamically stable off pressors. Problem: Fluid Volume - Imbalance:  Goal: Absence of imbalanced fluid volume signs and symptoms  Description: Absence of imbalanced fluid volume signs and symptoms  Outcome: Ongoing  Note: Diuresed today with Lasix. Problem: Nutrition Deficit:  Goal: Ability to achieve adequate nutritional intake will improve  Description: Ability to achieve adequate nutritional intake will improve  Outcome: Ongoing  Note: Clear liquid diet today.      Problem: Pain:  Goal: Pain level will decrease  Description: Pain level will decrease  Outcome:

## 2020-08-11 NOTE — PROGRESS NOTES
CRITICAL CARE PROGRESS NOTE      Patient:  Nette Rivera    Unit/Bed:4D-08/008-A  YOB: 1960  MRN: [id number]   PCP: Terry Wang MD  Date of Admission: 8/7/2020  Chief Complaint:- Abdominal pain    Assessment and Plan:    1. Acute Postoperative Pulmonary Insufficiency: Patient intubated for surgical procedure. Currently on pressure support of 10, PEEP of 6, and FiO2 of 30%. Continues to fail spontaneous breathing trial due sedation, will continue to wean and plan to extubate.  - Patient extubated at 0800.  2. Septic Shock: Abdominal source, large left adnexal abscess. S/P fluid resuscitation. IV norepinephrine discontinued on 8/11/2020. Blood pressures have normalized. PCT improving. 3. Severe Pneumonia: Left lower lobe, community aquired present on admission. Pseudomonas aeruginosa positive on PCR. Continue IV cefepime day 3. Decadron IV for 4 days, currently Day 1.  4. Large Left Adnexal Abscess: S/P Exploratory laparotomy with left salpingo oophorectomy, small bowel exploration, and irrigation of purulent material on 8/8/2020 for ovarian abscess per Dr. Norm Wiseman. Continue IV cefepime and Flagyl, day 3.  5. Hypervolemia: Due to IV fluid resuscitation and maintenance with a net intake/output of + 12.1 L. Will order Lasix 40 mg for diuresis. 6. Normocytic Anemia:  (Stable)  No signs of active bleeding. Hgb: 9.5  7. Impaired Glucose Tolerance:  (Resolved)    8. GERD:  Continue PPI. 9. Obesity:  BMI 32.65. Weight loss education when appropriate. INITIAL H AND P AND ICU COURSE:  Nette Rivera is a 62 y.o. postmenopausal female with past medical history of COPD, chronic back pain, GERD, and impaired glucose intolerance who is being admitted for sepsis and pelvic mass. Ms. Nato Manley noticed abdominal pain, fever, vaginal bleeding.  Patient identifies onset vaginal bleeding with passage of clots, averaging 3-4 pads/day.  Onset proximately 2 weeks ago.  Patient also identifies a fever of 102.9 that started approximately 2 days ago with complaints of lower abdominal pain. Yaquelin Lieu states she noted a mass coming out of her vagina and tried to pull it out unsuccessfully secondary to pain.  2020 transvaginal ultrasound noted large complex lesion in the left ovary could relate to abscess or hemorrhagic neoplasm.  CT abdomen pelvis with IV contrast mild groundglass airspace opacities within the lingula suggest pneumonia.  Mild fatty infiltration of liver, multiple gallstones biliary.  A spiculated cystic mass within the left pelvis.  Large pelvic mass which is mildly inflamed likely related to the left ovary. Evaluation occurred by OB/GYN Dr. Ivan Thompson, an Endocervical polyp was removed and sent for pathology.  Patient was sent to [facility] ICU stepdown    2020: Patient was hypotensive and not responsive to 2 L of 0.9 normal saline systolic blood pressure remained 68-74 with a heart rate of 120.  Levophed drip was started and patient was admitted to the ICU for further evaluation and care. Past Medical History:  COPD, chronic back pain, impaired glucose tolerance, GERD  Family History: Mother: , had hypertension and asthma. Father: Living with hypertension. Social History: Former smoker 20 pack years, quit . Denies alcohol or illicit substance use. ROS: (12 point review of systems completed. Pertinent positives noted.  Otherwise ROS is negative)     Scheduled Meds:   phosphorus replacement protocol   Other RX Placeholder    dexamethasone  4 mg Intravenous Daily    chlorhexidine  15 mL Mouth/Throat BID    famotidine (PEPCID) injection  20 mg Intravenous BID    insulin lispro  0-6 Units Subcutaneous 4 times per day    magnesium replacement protocol   Other RX Placeholder    calcium replacement protocol   Other RX Placeholder    sodium chloride flush  10 mL Intravenous 2 times per day    enoxaparin  40 mg Subcutaneous Daily    cefepime  2 g Intravenous Q12H    metroNIDAZOLE  500 mg Intravenous Q8H     Continuous Infusions:   lactated ringers 30 mL/hr at 08/11/20 0743    dextrose      norepinephrine Stopped (08/10/20 2234)       PHYSICAL EXAMINATION:  T:  98.6.  P:  108. RR:  13. B/P:  116/62. FiO2:  30. O2 Sat:  90.  I/O:  3415/1475 Net: + 12.7 L  Body mass index is 33.65 kg/m². GCS:   14  PC: 10/6: TV: 544: RRTotal: 12: Ti:1 sec  General:   Somnolent due to sedation but responsive. Acutely ill appearing. HEENT:  normocephalic and atraumatic. No scleral icterus. PERR  Neck: supple. No Thyromegaly. Lungs: clear to auscultation. No wheezing, rhonchi, or crackles. No retractions  Cardiac: RRR. No JVD. Abdomen: soft. Nontender. Clean, dry, and intact periumbilical dressing in place over surgical site. No surrounding erythema. Extremities:  No clubbing, cyanosis. Trace distal non-pitting edema x4. Vasculature: capillary refill < 3 seconds. 2+ dorsalis pedis pulses. Skin:  Pale, warm and dry. Psych: Mild confusion. Lymph:  No supraclavicular adenopathy. Neurologic:  No focal deficit. No seizures. Data: (All radiographs, tracings, PFTs, and imaging are personally viewed and interpreted unless otherwise noted). CBC:    Ref. Range 8/11/2020 04:37   WBC Latest Ref Range: 4.8 - 10.8 thou/mm3 8.6   RBC Latest Ref Range: 4.20 - 5.40 mill/mm3 3.15 (L)   Hemoglobin Quant Latest Ref Range: 12.0 - 16.0 gm/dl 9.5 (L)   Hematocrit Latest Ref Range: 37.0 - 47.0 % 32.0 (L)   MCV Latest Ref Range: 81.0 - 99.0 fL 101.6 (H)   MCH Latest Ref Range: 26.0 - 33.0 pg 30.2   MCHC Latest Ref Range: 32.2 - 35.5 gm/dl 29.7 (L)   MPV Latest Ref Range: 9.4 - 12.4 fL 10.6   RDW-CV Latest Ref Range: 11.5 - 14.5 % 13.7   RDW-SD Latest Ref Range: 35.0 - 45.0 fL 50.9 (H)   Platelet Count Latest Ref Range: 130 - 400 thou/mm3 214     BMP:   Ref.  Range 8/11/2020 04:37   Sodium Latest Ref Range: 135 - 145 meq/L 141   Potassium Latest Ref Range: 3.5 - 5.2 meq/L 4.6   Chloride Latest Ref Range: 98 - 111 meq/L 107   CO2 Latest Ref Range: 23 - 33 meq/L 29   BUN Latest Ref Range: 7 - 22 mg/dL 28 (H)   Creatinine Latest Ref Range: 0.4 - 1.2 mg/dL 0.5   Anion Gap Latest Ref Range: 8.0 - 16.0 meq/L 5.0 (L)   Calcium, Ion Latest Ref Range: 1.12 - 1.32 mmol/L 1.09 (L)   Est, Glom Filt Rate Latest Units: ml/min/1.73m2 >90   Magnesium Latest Ref Range: 1.6 - 2.4 mg/dL 2.2   Glucose Latest Ref Range: 70 - 108 mg/dL 141 (H)   Calcium Latest Ref Range: 8.5 - 10.5 mg/dL 7.5 (L)   Phosphorus Latest Ref Range: 2.4 - 4.7 mg/dL 2.0 (L)      Telemetry shows: Sinus tachycardia between 105-110 bpm.      Seen with multidisciplinary ICU team.  Meets Continued ICU Level Care Criteria:    [x] Yes, pending extubation efforts today   [] No - Transfer Planned to listed location:  [] HOSPITALIST CONTACTED-      Case and plan discussed with Dr. Clive Ward.         Electronically signed by Boaz Grover

## 2020-08-11 NOTE — PROGRESS NOTES
[address] THERAPY MISSED TREATMENT NOTE  [facility] ICU 4D  4D-08/008-A      Date: 2020  Patient Name: Maci Lieberman        CSN: [id number]   : 1960  (62 y.o.)  Gender: female                REASON FOR MISSED TREATMENT: Pt was extubated earlier, per nurse still pretty drowsy. Nurse recommends check back tomorrow.

## 2020-08-11 NOTE — FLOWSHEET NOTE
0800 Mrs Nicanor Ravi rests in the bed, intubated and sedated with Precedex and Fentanyl. She has a central line right SC site clear. These sedation gtts have been turned down by the intensivists as we will attempt to extubate her this AM. She appears calm. She has a temp sensing olivera patent to gravity. She has a midline abd incision. The dressing for this is clean and dry. She has active bowel sounds. 0830 She has been extubated. She tolerates this well on 2 liters NC. She is able to demonstrate orientation to person only. 0900 Dr Adam Rollins has come to see. OK to start clear liquid diet. 1000 She tolerates ice chips well.

## 2020-08-11 NOTE — PLAN OF CARE
Problem: Pain:  Goal: Pain level will decrease  Description: Pain level will decrease  8/10/2020 2118 by Rolan Bernal RN  Outcome: Ongoing  Note: Pt pain assessed with CPOT. PRN pain meds given per MAR. Problem: Nutrition  Goal: Optimal nutrition therapy  8/10/2020 2118 by Rolan Bernal RN  Outcome: Ongoing  Note: Pt NPO. Pt has absent bowel sounds x4. Problem: Discharge Planning:  Goal: Discharged to appropriate level of care  Description: Discharged to appropriate level of care  8/10/2020 2118 by Rolan Bernal RN  Outcome: Ongoing  Note: Pt continues to require ICU care. Pt from private residence. Problem: Airway Clearance - Ineffective:  Goal: Ability to maintain a clear airway will improve  Description: Ability to maintain a clear airway will improve  8/10/2020 2118 by Rolan Bernal RN  Outcome: Ongoing  Note: Pt continues to require mechanical ventilation. Problem: Aspiration:  Goal: Absence of aspiration  Description: Absence of aspiration  8/10/2020 2118 by Rolan Bernal RN  Outcome: Ongoing  Note: No evidence of aspiration this far in shift. Pt HOB 30 degrees. NG on LIWS. Problem: Bowel Function - Altered:  Goal: Bowel elimination is within specified parameters  Description: Bowel elimination is within specified parameters  8/10/2020 2118 by Rolan Bernal RN  Outcome: Ongoing  Note: No BM this far in shift. Pt bowel sounds absent x4. Problem: Cardiac Output - Decreased:  Goal: Hemodynamic stability will improve  Description: Hemodynamic stability will improve  8/10/2020 2118 by Rolan Bernal RN  Outcome: Ongoing  Note: Pt on levophed. Weaning to a MAP > 65. Problem: Fluid Volume - Imbalance:  Goal: Absence of imbalanced fluid volume signs and symptoms  Description: Absence of imbalanced fluid volume signs and symptoms  8/10/2020 2118 by Rolan Bernal RN  Outcome: Ongoing  Note: Pt on LR @ 100ml/hr. Urine output > 30ml/hr.       Problem: Gas Exchange - Impaired:  Goal: Levels of oxygenation will improve  Description: Levels of oxygenation will improve  8/10/2020 2118 by Gene Dwyer RN  Outcome: Ongoing  Note: Pt O2 sat 93% on Fio2 30%. Problem: Pain:  Goal: Pain level will decrease  Description: Pain level will decrease  8/10/2020 2118 by Gene Dwyer RN  Outcome: Ongoing  Note: Pt pain assessed with CPOT. PRN pain meds given per MAR. Problem: Serum Glucose Level - Abnormal:  Goal: Ability to maintain appropriate glucose levels will improve to within specified parameters  Description: Ability to maintain appropriate glucose levels will improve to within specified parameters  8/10/2020 2118 by Gene Dwyer RN  Outcome: Ongoing  Note: Pt on humalog for elevated glucose levels. Problem: Skin Integrity - Impaired:  Goal: Will show no infection signs and symptoms  Description: Will show no infection signs and symptoms  8/10/2020 2118 by Gene Dwyer RN  Outcome: Ongoing  Note: Pt on antibiotics for known infection. Problem: Skin Integrity - Impaired:  Goal: Absence of new skin breakdown  Description: Absence of new skin breakdown  8/10/2020 2118 by Gene Dwyer RN  Outcome: Ongoing  Note: No evidence of new skin breakdown this far in shift. Pt turned and repositioned q 2 hr and prn. Bilateral arms and heels elevated on pillows. Problem: Infection - Central Venous Catheter-Associated Bloodstream Infection:  Goal: Will show no infection signs and symptoms  Description: Will show no infection signs and symptoms  8/10/2020 2118 by Gene Dwyer RN  Outcome: Ongoing  Note: No evidence of CVC related infection this far in shift. Problem: Infection - Surgical Site:  Goal: Will show no infection signs and symptoms  Description: Will show no infection signs and symptoms  8/10/2020 2118 by Gene Dwyer RN  Outcome: Ongoing  Note: No evidence of CVC related infection this far in shift.       Problem: Urinary Elimination:  Goal: Signs and symptoms of infection will decrease  Description: Signs and symptoms of infection will decrease  8/10/2020 2118 by Rolan Bernal RN  Outcome: Ongoing  Note: Pt continues with olivera catheter. Will continue to evaluate for need. Problem: Infection - Ventilator-Associated Pneumonia:  Goal: Absence of pulmonary infection  Description: Absence of pulmonary infection  8/10/2020 2118 by Rolan Beranl RN  Outcome: Ongoing  Note: Pt remains on vent. Oral care provided q 2 hr and prn. Problem: Skin Integrity:  Goal: Will show no infection signs and symptoms  Description: Will show no infection signs and symptoms  8/10/2020 2118 by Rolan Bernal RN  Outcome: Ongoing  Note: No evidence of CVC related infection this far in shift. Pt unable to participate in care planning due to being sedated on vent. Family not present at this time.

## 2020-08-11 NOTE — FLOWSHEET NOTE
1600 Mrs Fanny Crook was assisted up to the chair. She tolerates this fair with mod assist. She was able to pivot to the chair. 1830 She was assisted back to the bed.  She was medicated for back pain with 1 Norco.

## 2020-08-11 NOTE — PROGRESS NOTES
Patient has been successfully weaned from Mechanical Ventilation. RSBI before extubation was 17 with EtCO2 of 47 and SpO2 of 96 on 30% FiO2. Patient extubated and placed on 2 liters/min via nasal cannula. Post extubation SpO2 is 95% with HR  118 bpm and RR 14 breaths/min. Patient had strong cough that was productive of clear  sputum. Extubation Well tolerated by patient. Reji Joya

## 2020-08-12 ENCOUNTER — APPOINTMENT (OUTPATIENT)
Dept: GENERAL RADIOLOGY | Age: 60
DRG: 853 | End: 2020-08-12
Payer: COMMERCIAL

## 2020-08-12 PROBLEM — N70.92 OVARIAN ABSCESS: Status: ACTIVE | Noted: 2020-08-07

## 2020-08-12 LAB
AEROBIC CULTURE: ABNORMAL
ANAEROBIC CULTURE: ABNORMAL
ANAEROBIC CULTURE: NORMAL
ANION GAP SERPL CALCULATED.3IONS-SCNC: 5 MEQ/L (ref 8–16)
BASOPHILS # BLD: 0.1 %
BASOPHILS ABSOLUTE: 0 THOU/MM3 (ref 0–0.1)
BODY FLUID CULTURE, STERILE: NORMAL
BUN BLDV-MCNC: 32 MG/DL (ref 7–22)
CALCIUM SERPL-MCNC: 8 MG/DL (ref 8.5–10.5)
CHLORIDE BLD-SCNC: 104 MEQ/L (ref 98–111)
CO2: 35 MEQ/L (ref 23–33)
CREAT SERPL-MCNC: 0.4 MG/DL (ref 0.4–1.2)
EOSINOPHIL # BLD: 0 %
EOSINOPHILS ABSOLUTE: 0 THOU/MM3 (ref 0–0.4)
ERYTHROCYTE [DISTWIDTH] IN BLOOD BY AUTOMATED COUNT: 13.2 % (ref 11.5–14.5)
ERYTHROCYTE [DISTWIDTH] IN BLOOD BY AUTOMATED COUNT: 48.6 FL (ref 35–45)
GFR SERPL CREATININE-BSD FRML MDRD: > 90 ML/MIN/1.73M2
GLUCOSE BLD-MCNC: 120 MG/DL (ref 70–108)
GLUCOSE BLD-MCNC: 121 MG/DL (ref 70–108)
GLUCOSE BLD-MCNC: 121 MG/DL (ref 70–108)
GLUCOSE BLD-MCNC: 123 MG/DL (ref 70–108)
GLUCOSE BLD-MCNC: 125 MG/DL (ref 70–108)
GLUCOSE BLD-MCNC: 129 MG/DL (ref 70–108)
GRAM STAIN RESULT: ABNORMAL
GRAM STAIN RESULT: NORMAL
HCT VFR BLD CALC: 36.3 % (ref 37–47)
HEMOGLOBIN: 10.9 GM/DL (ref 12–16)
IMMATURE GRANS (ABS): 1.41 THOU/MM3 (ref 0–0.07)
IMMATURE GRANULOCYTES: 10 %
LYMPHOCYTES # BLD: 6 %
LYMPHOCYTES ABSOLUTE: 0.8 THOU/MM3 (ref 1–4.8)
MCH RBC QN AUTO: 29.7 PG (ref 26–33)
MCHC RBC AUTO-ENTMCNC: 30 GM/DL (ref 32.2–35.5)
MCV RBC AUTO: 98.9 FL (ref 81–99)
MONOCYTES # BLD: 6.5 %
MONOCYTES ABSOLUTE: 0.9 THOU/MM3 (ref 0.4–1.3)
NUCLEATED RED BLOOD CELLS: 0 /100 WBC
ORGANISM: ABNORMAL
PHOSPHORUS: 2 MG/DL (ref 2.4–4.7)
PLATELET # BLD: 309 THOU/MM3 (ref 130–400)
PMV BLD AUTO: 10.3 FL (ref 9.4–12.4)
POTASSIUM SERPL-SCNC: 4 MEQ/L (ref 3.5–5.2)
RBC # BLD: 3.67 MILL/MM3 (ref 4.2–5.4)
SEG NEUTROPHILS: 77.4 %
SEGMENTED NEUTROPHILS ABSOLUTE COUNT: 10.9 THOU/MM3 (ref 1.8–7.7)
SODIUM BLD-SCNC: 144 MEQ/L (ref 135–145)
WBC # BLD: 14.1 THOU/MM3 (ref 4.8–10.8)

## 2020-08-12 PROCEDURE — 99233 SBSQ HOSP IP/OBS HIGH 50: CPT | Performed by: INTERNAL MEDICINE

## 2020-08-12 PROCEDURE — 2580000003 HC RX 258: Performed by: OBSTETRICS & GYNECOLOGY

## 2020-08-12 PROCEDURE — 97110 THERAPEUTIC EXERCISES: CPT

## 2020-08-12 PROCEDURE — 99024 POSTOP FOLLOW-UP VISIT: CPT | Performed by: SURGERY

## 2020-08-12 PROCEDURE — 2580000003 HC RX 258: Performed by: NURSE PRACTITIONER

## 2020-08-12 PROCEDURE — 2140000000 HC CCU INTERMEDIATE R&B

## 2020-08-12 PROCEDURE — 6360000002 HC RX W HCPCS: Performed by: OBSTETRICS & GYNECOLOGY

## 2020-08-12 PROCEDURE — 6370000000 HC RX 637 (ALT 250 FOR IP): Performed by: NURSE PRACTITIONER

## 2020-08-12 PROCEDURE — 82948 REAGENT STRIP/BLOOD GLUCOSE: CPT

## 2020-08-12 PROCEDURE — 74018 RADEX ABDOMEN 1 VIEW: CPT

## 2020-08-12 PROCEDURE — 2500000003 HC RX 250 WO HCPCS: Performed by: NURSE PRACTITIONER

## 2020-08-12 PROCEDURE — 85025 COMPLETE CBC W/AUTO DIFF WBC: CPT

## 2020-08-12 PROCEDURE — 6360000002 HC RX W HCPCS: Performed by: NURSE PRACTITIONER

## 2020-08-12 PROCEDURE — 6370000000 HC RX 637 (ALT 250 FOR IP): Performed by: OBSTETRICS & GYNECOLOGY

## 2020-08-12 PROCEDURE — APPSS45 APP SPLIT SHARED TIME 31-45 MINUTES: Performed by: NURSE PRACTITIONER

## 2020-08-12 PROCEDURE — 2500000003 HC RX 250 WO HCPCS: Performed by: OBSTETRICS & GYNECOLOGY

## 2020-08-12 PROCEDURE — 36415 COLL VENOUS BLD VENIPUNCTURE: CPT

## 2020-08-12 PROCEDURE — 84100 ASSAY OF PHOSPHORUS: CPT

## 2020-08-12 PROCEDURE — 80048 BASIC METABOLIC PNL TOTAL CA: CPT

## 2020-08-12 PROCEDURE — 97165 OT EVAL LOW COMPLEX 30 MIN: CPT

## 2020-08-12 PROCEDURE — 6360000002 HC RX W HCPCS: Performed by: INTERNAL MEDICINE

## 2020-08-12 RX ORDER — MORPHINE SULFATE 2 MG/ML
2 INJECTION, SOLUTION INTRAMUSCULAR; INTRAVENOUS
Status: DISCONTINUED | OUTPATIENT
Start: 2020-08-12 | End: 2020-08-18 | Stop reason: HOSPADM

## 2020-08-12 RX ORDER — ALBUTEROL SULFATE 90 UG/1
2 AEROSOL, METERED RESPIRATORY (INHALATION) EVERY 6 HOURS PRN
Status: DISCONTINUED | OUTPATIENT
Start: 2020-08-12 | End: 2020-08-18 | Stop reason: HOSPADM

## 2020-08-12 RX ORDER — BISACODYL 10 MG
10 SUPPOSITORY, RECTAL RECTAL DAILY PRN
Status: DISCONTINUED | OUTPATIENT
Start: 2020-08-12 | End: 2020-08-18 | Stop reason: HOSPADM

## 2020-08-12 RX ORDER — KETOROLAC TROMETHAMINE 30 MG/ML
15 INJECTION, SOLUTION INTRAMUSCULAR; INTRAVENOUS EVERY 6 HOURS
Status: COMPLETED | OUTPATIENT
Start: 2020-08-12 | End: 2020-08-17

## 2020-08-12 RX ORDER — FUROSEMIDE 10 MG/ML
20 INJECTION INTRAMUSCULAR; INTRAVENOUS ONCE
Status: COMPLETED | OUTPATIENT
Start: 2020-08-12 | End: 2020-08-12

## 2020-08-12 RX ORDER — MORPHINE SULFATE 4 MG/ML
4 INJECTION, SOLUTION INTRAMUSCULAR; INTRAVENOUS
Status: DISCONTINUED | OUTPATIENT
Start: 2020-08-12 | End: 2020-08-18 | Stop reason: HOSPADM

## 2020-08-12 RX ADMIN — METRONIDAZOLE 500 MG: 500 INJECTION, SOLUTION INTRAVENOUS at 16:45

## 2020-08-12 RX ADMIN — KETOROLAC TROMETHAMINE 15 MG: 30 INJECTION, SOLUTION INTRAMUSCULAR at 23:57

## 2020-08-12 RX ADMIN — METRONIDAZOLE 500 MG: 500 INJECTION, SOLUTION INTRAVENOUS at 08:54

## 2020-08-12 RX ADMIN — ONDANSETRON 4 MG: 2 INJECTION INTRAMUSCULAR; INTRAVENOUS at 08:17

## 2020-08-12 RX ADMIN — CEFEPIME 2 G: 2 INJECTION, POWDER, FOR SOLUTION INTRAMUSCULAR; INTRAVENOUS at 01:57

## 2020-08-12 RX ADMIN — FUROSEMIDE 20 MG: 10 INJECTION, SOLUTION INTRAMUSCULAR; INTRAVENOUS at 16:21

## 2020-08-12 RX ADMIN — SODIUM CHLORIDE, PRESERVATIVE FREE 10 ML: 5 INJECTION INTRAVENOUS at 21:09

## 2020-08-12 RX ADMIN — METRONIDAZOLE 500 MG: 500 INJECTION, SOLUTION INTRAVENOUS at 00:34

## 2020-08-12 RX ADMIN — HYDROCODONE BITARTRATE AND ACETAMINOPHEN 1 TABLET: 7.5; 325 TABLET ORAL at 00:35

## 2020-08-12 RX ADMIN — CEFEPIME 2 G: 2 INJECTION, POWDER, FOR SOLUTION INTRAMUSCULAR; INTRAVENOUS at 23:57

## 2020-08-12 RX ADMIN — FENTANYL CITRATE 25 MCG: 50 INJECTION INTRAMUSCULAR; INTRAVENOUS at 02:39

## 2020-08-12 RX ADMIN — MORPHINE SULFATE 2 MG: 2 INJECTION, SOLUTION INTRAMUSCULAR; INTRAVENOUS at 14:20

## 2020-08-12 RX ADMIN — BISACODYL 10 MG: 10 SUPPOSITORY RECTAL at 18:00

## 2020-08-12 RX ADMIN — ENOXAPARIN SODIUM 40 MG: 40 INJECTION SUBCUTANEOUS at 21:21

## 2020-08-12 RX ADMIN — FAMOTIDINE 20 MG: 10 INJECTION, SOLUTION INTRAVENOUS at 21:09

## 2020-08-12 RX ADMIN — KETOROLAC TROMETHAMINE 15 MG: 30 INJECTION, SOLUTION INTRAMUSCULAR at 18:00

## 2020-08-12 RX ADMIN — DEXAMETHASONE SODIUM PHOSPHATE 4 MG: 4 INJECTION, SOLUTION INTRAMUSCULAR; INTRAVENOUS at 08:58

## 2020-08-12 RX ADMIN — ONDANSETRON 4 MG: 2 INJECTION INTRAMUSCULAR; INTRAVENOUS at 21:09

## 2020-08-12 RX ADMIN — HYDROCODONE BITARTRATE AND ACETAMINOPHEN 1 TABLET: 7.5; 325 TABLET ORAL at 09:03

## 2020-08-12 RX ADMIN — FAMOTIDINE 20 MG: 10 INJECTION, SOLUTION INTRAVENOUS at 09:03

## 2020-08-12 RX ADMIN — SODIUM CHLORIDE, PRESERVATIVE FREE 10 ML: 5 INJECTION INTRAVENOUS at 08:18

## 2020-08-12 RX ADMIN — CEFEPIME 2 G: 2 INJECTION, POWDER, FOR SOLUTION INTRAMUSCULAR; INTRAVENOUS at 14:09

## 2020-08-12 RX ADMIN — SODIUM CHLORIDE, POTASSIUM CHLORIDE, SODIUM LACTATE AND CALCIUM CHLORIDE: 600; 310; 30; 20 INJECTION, SOLUTION INTRAVENOUS at 14:09

## 2020-08-12 ASSESSMENT — PAIN DESCRIPTION - PAIN TYPE
TYPE: ACUTE PAIN
TYPE: ACUTE PAIN
TYPE: CHRONIC PAIN
TYPE_2: ACUTE PAIN
TYPE: ACUTE PAIN

## 2020-08-12 ASSESSMENT — PAIN SCALES - GENERAL
PAINLEVEL_OUTOF10: 9
PAINLEVEL_OUTOF10: 6
PAINLEVEL_OUTOF10: 9
PAINLEVEL_OUTOF10: 9
PAINLEVEL_OUTOF10: 6
PAINLEVEL_OUTOF10: 9
PAINLEVEL_OUTOF10: 6
PAINLEVEL_OUTOF10: 9

## 2020-08-12 ASSESSMENT — PAIN DESCRIPTION - ORIENTATION
ORIENTATION: MID

## 2020-08-12 ASSESSMENT — PAIN DESCRIPTION - ONSET
ONSET: ON-GOING

## 2020-08-12 ASSESSMENT — PAIN DESCRIPTION - LOCATION
LOCATION: INCISION
LOCATION: INCISION
LOCATION: BACK
LOCATION_2: ABDOMEN
LOCATION: ABDOMEN

## 2020-08-12 ASSESSMENT — PAIN DESCRIPTION - INTENSITY: RATING_2: 6

## 2020-08-12 ASSESSMENT — PAIN DESCRIPTION - PROGRESSION
CLINICAL_PROGRESSION: NOT CHANGED

## 2020-08-12 ASSESSMENT — PAIN DESCRIPTION - DESCRIPTORS
DESCRIPTORS: ACHING;CONSTANT
DESCRIPTORS: ACHING
DESCRIPTORS: ACHING;CONSTANT
DESCRIPTORS: ACHING

## 2020-08-12 ASSESSMENT — PAIN - FUNCTIONAL ASSESSMENT
PAIN_FUNCTIONAL_ASSESSMENT: PREVENTS OR INTERFERES WITH ALL ACTIVE AND SOME PASSIVE ACTIVITIES
PAIN_FUNCTIONAL_ASSESSMENT: ACTIVITIES ARE NOT PREVENTED
PAIN_FUNCTIONAL_ASSESSMENT: ACTIVITIES ARE NOT PREVENTED
PAIN_FUNCTIONAL_ASSESSMENT: PREVENTS OR INTERFERES WITH MANY ACTIVE NOT PASSIVE ACTIVITIES

## 2020-08-12 ASSESSMENT — PAIN DESCRIPTION - FREQUENCY
FREQUENCY: CONTINUOUS

## 2020-08-12 NOTE — PROGRESS NOTES
[facility] GENERAL SURGERY  General Surgery Postoperative Progress Note    Pt Name: Shannon Bass  Medical Record Number: [id number]  Date of Birth 1960   Today's Date: 8/12/2020  ASSESSMENT   1. POD # 4ex lap, abdominal washout , left salpingo-oophorectomy wit adnexal abscess excision performed by Dr. Jie Humphrey with gynecology  2. Sepsis shock  present at time of admission- resolving  3. Purulent peritonitis present at time of surgery  4. Left adnexal abscess - path :Ovarian cyst, right, resection:    Cyst formation with abscess and inflamed partly ciliated epithelium.       Ovary with hemorrhage. has a past medical history of Allergic rhinitis, Chronic back pain, Headache(784.0), and Panlobular emphysema ([address] PLAN     1. Cefipime  And Flagyl approprieate coverage for culture positive for Fuosbacertim Nucleatum   2. Replace NG if patient continues to vomit  3. Repeat abdominal xray in AM   4. NPO while awaiting return of bowel function   5. Continue local wound care with daily packing changes, replace dressing when damp    SUBJECTIVE   Pat Bigness  Is extubated, pain is controlled. Had large volume emesis this morning. Patient adamant that she does not want an NG at this time.   CURRENT MEDICATIONS   Scheduled Meds:   insulin lispro  0-6 Units Subcutaneous 4x Daily AC & HS    phosphorus replacement protocol   Other RX Placeholder    dexamethasone  4 mg Intravenous Daily    famotidine (PEPCID) injection  20 mg Intravenous BID    magnesium replacement protocol   Other RX Placeholder    calcium replacement protocol   Other RX Placeholder    sodium chloride flush  10 mL Intravenous 2 times per day    enoxaparin  40 mg Subcutaneous Daily    cefepime  2 g Intravenous Q12H    metroNIDAZOLE  500 mg Intravenous Q8H     Continuous Infusions:   lactated ringers 50 mL/hr at 08/12/20 0849    dextrose       PRN Meds:.albuterol sulfate HFA, [Held by provider] HYDROcodone-acetaminophen, fentanNYL, acetaminophen **OR** acetaminophen, potassium chloride, potassium chloride, potassium chloride **OR** potassium alternative oral replacement **OR** potassium chloride, glucose, dextrose, glucagon (rDNA), dextrose, sodium chloride flush, promethazine **OR** ondansetron  OBJECTIVE   CURRENT VITALS:  height is 5' 8\" (1.727 m) and weight is 221 lb 5.5 oz (100.4 kg). Her bladder temperature is 97.8 °F (36.6 °C). Her blood pressure is 143/82 (abnormal) and her pulse is 107. Her respiration is 16 and oxygen saturation is 93%. Temperature Range (24h):Temp: 97.8 °F (36.6 °C) Temp  Av.8 °F (36.6 °C)  Min: 97.5 °F (36.4 °C)  Max: 98.1 °F (36.7 °C)  BP Range (82J): Systolic (97CRT), CPN:773 , Min:111 , CPD:856     Diastolic (95QVL), BWQ:24, Min:65, Max:90    Pulse Range (24h): Pulse  Av  Min: 100  Max: 117  Respiration Range (24h): Resp  Av.4  Min: 10  Max: 24  Current Pulse Ox (24h):  SpO2: 93 %  Pulse Ox Range (24h):  SpO2  Av.2 %  Min: 93 %  Max: 98 %  Oxygen Amount and Delivery: O2 Flow Rate (L/min): 2 L/min  Incentive Spirometry Tx:            Extubated, interactive  Abdomen is soft nondistended. Incision is clean, it is draining some serosanguineous fluid, dressing is saturated. It was replaced bedside. Packing is in place. No erythema or induration   Noves all extremities, no peripheral edema     In: 624 [I.V.:624]  Out: 600 [Urine:600]     Date 20 - 20   Shift  3414-4489 6147-9641 24 Hour Total   INTAKE   P.O.(mL/kg/hr) 0(0)   0   I. V.(mL/kg) 363(3.6)   363(3.6)   Shift Total(mL/kg) 363(3.6)   363(3.6)   OUTPUT   Urine(mL/kg/hr) 300(0.4)   300   Emesis/NG output(mL/kg) 0(0)   0(0)   Other(mL/kg) 0(0)   0(0)   Stool(mL/kg) 0(0)   0(0)   Blood(mL/kg) 0(0)   0(0)   Shift Total(mL/kg) 300(3)   300(3)   Weight (kg) 100.4 100.4 100.4 100.4     LABS     Recent Labs     08/10/20  0320 20  0437 20  0610   WBC 6.9 8.6 14.1*   HGB 9.6* 9.5* 10.9* HCT 32.3* 32.0* 36.3*    214 309    141 144   K 4.3 4.6 4.0    107 104   CO2 25 29 35*   BUN 26* 28* 32*   CREATININE 0.6 0.5 0.4   MG 1.8 2.2  --    PHOS 2.0* 2.0* 2.0*   CALCIUM 7.3* 7.5* 8.0*      No results for input(s): PTT, INR in the last 72 hours. Invalid input(s): PT  No results for input(s): AST, ALT, BILITOT, BILIDIR, AMYLASE, LIPASE, LDH, LACTA in the last 72 hours. No results for input(s): TROPONINT in the last 72 hours. RADIOLOGY     XR ABDOMEN (KUB) (SINGLE AP VIEW)   Final Result   Increased gaseous distention throughout the small bowel loops. This is likely related to postsurgical ileus although a bowel obstruction can't be excluded. **This report has been created using voice recognition software. It may contain minor errors which are inherent in voice recognition technology. **      Final report electronically signed by Dr Canelo Cheng on 8/12/2020 9:16 AM      XR CHEST PORTABLE   Final Result      Improved lateral left basilar infiltrate/atelectasis/small left pleural effusion. Mild atelectasis at the medial right lung base. Possible left hilar adenopathy which appears to be mildly improved. **This report has been created using voice recognition software. It may contain minor errors which are inherent in voice recognition technology. **      Final report electronically signed by Dr. Iker Angulo on 8/10/2020 5:19 AM      XR ABDOMEN (KUB) (SINGLE AP VIEW)   Final Result      Non obstructive bowel gas pattern. No evidence of an ileus. **This report has been created using voice recognition software. It may contain minor errors which are inherent in voice recognition technology. **      Final report electronically signed by Dr. Iker Angulo on 8/10/2020 3:26 AM      XR CHEST PORTABLE   Final Result   ET node G-tube positions as above. Left lower lobe atelectasis or infiltrate.             **This report has been created using voice contain minor errors which are inherent in voice recognition technology. **      Final report electronically signed by Dr. Andrea Warner on 8/7/2020 1:44 PM      CT ABDOMEN PELVIS W IV CONTRAST Additional Contrast? None   Final Result   Lingular pneumonia. Cholelithiasis. Large pelvic mass which is mildly inflamed likely relating to the left ovary. Benign and malignant etiologies are possible. **This report has been created using voice recognition software. It may contain minor errors which are inherent in voice recognition technology. **      Final report electronically signed by Dr. Andrea Warner on 8/7/2020 1:23 PM          Electronically signed by Gianna Jaramillo MD on 8/12/2020 at 10:59 AM

## 2020-08-12 NOTE — PROGRESS NOTES
Geraldine Gotti 58  INPATIENT OCCUPATIONAL THERAPY  [facility] CCU-STEPDOWN 3B  EVALUATION    Time:    Time In: 5966  Time Out: 1536  Timed Code Treatment Minutes: 24 Minutes  Minutes: 39          Date: 2020  Patient Name: Pito Hurtado,   Gender: female      MRN: [id number]  : 1960  (62 y.o.)  Referring Practitioner: LAURE WRIGHT CNP  Diagnosis: ovarian mass  Additional Pertinent Hx: Per ER note on 2020:59 y.o. postmenopausal female with past medical history of COPD and chronic back pain who is being admitted for sepsis and pelvic mass. Ms. Chiqui Majano noticed a mild amount of painless vaginal bleeding that began on Tuesday of last week 20. This continued daily and worsened until the patient passed tissue and clots this  while using the restroom. The patient noted \"something was protruding from my vagina, so I pushed it back in\". The bleeding maintained and on 2020 the patient began having fevers at home. s/pLAPAROTOMY EXPLORATORY, LEFT SALPINGO OOPHORECTOMY, SMALL BOWEL EXPLORATION, irrigation of purulent material on 2020.  Intubated  to 2020    Restrictions/Precautions:  Restrictions/Precautions: Fall Risk  Position Activity Restriction  Other position/activity restrictions: recent abdominal sx, son reports chronic back issues that Pt often braces self with transitions in mobility    Subjective  Chart Reviewed: Yes, Orders, Progress Notes, History and Physical  Patient assessed for rehabilitation services?: Yes    Subjective: cooperative    Pain:  Pain Assessment  Patient Currently in Pain: Yes(abdomen)  Pain Level: 6    Social/Functional History:  Lives With: Alone  Type of Home: House  Home Layout: Two level, Bed/Bath upstairs  Home Access: Stairs to enter with rails(3)   Bathroom Shower/Tub: Tub/Shower unit  Bathroom Toilet: Standard  Bathroom Equipment: Grab bars in shower       ADL Assistance: [address] [place] [address] Avenue: Independent  Ambulation Assistance: Independent  Transfer Assistance: Independent          Additional Comments: Pt reports fully indep PLOF. Cognition/Orientation:     Overall Cognitive Status: (slow to respond at times)    ADL's:  UE Dressing: Moderate assistance(changing gown)  LE Dressing: Dependent/Total(for donning socks)       Functional Mobility:  Bed mobility  Supine to Sit: Moderate assistance  Sit to Supine: Moderate assistance  Scooting: Maximal assistance    Functional Mobility  Functional - Mobility Device: No device  Activity: (2 side steps toward [facility])  Assist Level: Minimal assistance  Functional Mobility Comments: unsteadiness noted     Balance:  Balance  Sitting Balance: Stand by assistance  Standing Balance: Contact guard assistance    Transfers:  Sit to stand: Minimal assistance(from EOB)  Stand to sit: Contact guard assistance       Upper Extremity Assessment:   LUE AROM : (shoulder flexion & abduction guarded to 45 degrees, distal WFL)  RUE AROM : (shoulder flexion & abduction guarded to 45 degrees, distal WFL)    LUE Strength  Gross LUE Strength: (NT d/t abdominal incision)  RUE Strength  Gross RUE Strength: (NT d/t abdominal incision)    Sensation  Overall Sensation Status: WFL(BUE)       Activity Tolerance: Patient limited by pain       Assessment:  Assessment: Pt demo decreased ADL & functional mobility status over PLOF s/p admission with ovarian mass & then sx. Continued OT recommended to educate Pt on compensatory strategies for returning to mobility & ADLs at home. Performance deficits / Impairments: Decreased functional mobility , Decreased endurance, Decreased ADL status, Decreased balance, Decreased safe awareness, Decreased cognition  Prognosis: Fair, Good  REQUIRES OT FOLLOW UP: Yes  Decision Making: Low Complexity  Safety Devices in place: Yes  Type of devices:  All fall risk precautions in place, Call light within reach, Gait belt, Bed alarm in place    Treatment Initiated: Treatment and education initiated within context of evaluation. Evaluation time included review of current medical information, gathering information related to past medical, social and functional history, completion of standardized testing, formal and informal observation of tasks, assessment of data and development of plan of care and goals. Treatment time included skilled education and facilitation of tasks to increase safety and independence with ADL's for improved functional independence and quality of life. Discharge Recommendations:  Continue to assess pending progress    Patient Education:  OT Education: OT Role, Plan of Care, ADL Adaptive Strategies, Transfer Training  Barriers to Learning: slow processing    Equipment Recommendations: Other: Monitor pending progress    Plan:  Times per week: 5x  Current Treatment Recommendations: Functional Mobility Training, Balance Training, Endurance Training, Safety Education & Training, Self-Care / ADL    Goals:  Patient goals : go home  Short term goals  Time Frame for Short term goals: until discharge  Short term goal 1: Complete various sit-stand t/fs including toilet with CGA & 0 vcs for safety  Short term goal 2: Complete mobility to/from bathroom with RW, CGA, & 0-2 vcs for walker safety  Short term goal 3: Tolerate 3-4 min standing ADL task with CGA for increased ease of sinkside grooming tasks  Short term goal 4: Complete LE dressing with min A & LH AE prn  Long term goals  Time Frame for Long term goals : No LTG set d/t short ELOS  See long-term goal time frame for expected duration of plan of care. If no long-term goals established, a short length of stay is anticipated. Following session, patient left in safe position with all fall risk precautions in place.

## 2020-08-12 NOTE — PROGRESS NOTES
1115:  Patient arrived the unit from ICU. 2 person skin assessment was completed with second RN, Benjamin Arzate. Oriented to the room and call light is within reach.

## 2020-08-12 NOTE — PROGRESS NOTES
Intensive Care Unit  Ob/Gyn  Attending Progress Note          SUBJECTIVE:  Events of the last 24 hours. Increased pain this am. Remembers me now.      OBJECTIVE:    Current Medications:  Current Facility-Administered Medications: insulin lispro (HUMALOG) injection vial 0-6 Units, 0-6 Units, Subcutaneous, 4x Daily AC & HS  albuterol sulfate  (90 Base) MCG/ACT inhaler 2 puff, 2 puff, Inhalation, Q6H PRN  [Held by provider] HYDROcodone-acetaminophen (NORCO) 7.5-325 MG per tablet 1 tablet, 1 tablet, Oral, Q6H PRN  fentaNYL (SUBLIMAZE) injection 25 mcg, 25 mcg, Intravenous, Q4H PRN  acetaminophen (TYLENOL) tablet 650 mg, 650 mg, Oral, Q4H PRN **OR** acetaminophen (TYLENOL) suppository 650 mg, 650 mg, Rectal, Q4H PRN  phosphorus replacement protocol, , Other, RX Placeholder  Dexamethasone Sodium Phosphate injection 4 mg, 4 mg, Intravenous, Daily  lactated ringers infusion, , Intravenous, Continuous  famotidine (PEPCID) injection 20 mg, 20 mg, Intravenous, BID  magnesium replacement protocol, , Other, RX Placeholder  calcium replacement protocol, , Other, RX Placeholder  potassium chloride 10 mEq/100 mL IVPB (Peripheral Line), 10 mEq, Intravenous, PRN  potassium chloride 20 mEq/50 mL IVPB (Central Line), 20 mEq, Intravenous, PRN  potassium chloride (KLOR-CON M) extended release tablet 40 mEq, 40 mEq, Oral, PRN **OR** potassium bicarb-citric acid (EFFER-K) effervescent tablet 40 mEq, 40 mEq, Oral, PRN **OR** potassium chloride 10 mEq/100 mL IVPB (Peripheral Line), 10 mEq, Intravenous, PRN  glucose (GLUTOSE) 40 % oral gel 15 g, 15 g, Oral, PRN  dextrose 50 % IV solution, 12.5 g, Intravenous, PRN  glucagon (rDNA) injection 1 mg, 1 mg, Intramuscular, PRN  dextrose 5 % solution, 100 mL/hr, Intravenous, PRN  sodium chloride flush 0.9 % injection 10 mL, 10 mL, Intravenous, 2 times per day  sodium chloride flush 0.9 % injection 10 mL, 10 mL, Intravenous, PRN  promethazine (PHENERGAN) tablet 12.5 mg, 12.5 mg, Oral, Q6H PRN **OR** ondansetron (ZOFRAN) injection 4 mg, 4 mg, Intravenous, Q6H PRN  enoxaparin (LOVENOX) injection 40 mg, 40 mg, Subcutaneous, Daily  cefepime (MAXIPIME) 2 g IVPB minibag, 2 g, Intravenous, Q12H  metronidazole (FLAGYL) 500 mg in NaCl 100 mL IVPB premix, 500 mg, Intravenous, Q8H    Physical:   VITALS:  BP (!) 143/82   Pulse 107   Temp 97.8 °F (36.6 °C) (Bladder)   Resp 16   Ht 5' 8\" (1.727 m)   Wt 221 lb 5.5 oz (100.4 kg)   SpO2 93%   BMI 33.65 kg/m²   CURRENT TEMPERATURE:  Temp: 97.8 °F (36.6 °C)  TEMPERATURE RANGE OVER 24HRS:   Temp  Av.8 °F (36.6 °C)  Min: 97.5 °F (36.4 °C)  Max: 98.1 °F (36.7 °C)  24HR RESPIRATORY RATE RANGE:  Resp  Av.4  Min: 10  Max: 24  24HR PULSE RANGE: Pulse  Av  Min: 100  Max: 117  24HR BLOOD PRESSURE RANGE:  Systolic (33HHE), LQU:509 , Min:111 , BII:535   ; Diastolic (56KFQ), VKV:90, Min:65, Max:90    24HR INTAKE/OUTPUT:      Intake/Output Summary (Last 24 hours) at 2020 1117  Last data filed at 2020 0600  Gross per 24 hour   Intake 1674 ml   Output 3300 ml   Net -1626 ml           LUNGS: Clear  To auscultation bilat. and with good air exchange  CARDIOVASCULAR:  Normal apical impulse, regular rate and rhythm  ABDOMEN:  Softly distended. Hypoactive BS dressing with minimal drainage.    MUSCULOSKELETAL:  there is no redness, warmth, or swelling of the joints    Data:  CBC:   Lab Results   Component Value Date    WBC 14.1 2020    RBC 3.67 2020    HGB 10.9 2020    HCT 36.3 2020    MCV 98.9 2020    RDW 13.2 2018     2020     BMP:    Lab Results   Component Value Date     2020    K 4.0 2020    K 3.5 2020     2020    CO2 35 2020    BUN 32 2020    CREATININE 0.4 2020    CALCIUM 8.0 2020    LABGLOM >90 2020    GLUCOSE 129 2020     Urine Culture:  neg  Blood Culture: neg  Peritoneal culture fusobacterium  Sputum Culture:  No components found for: CSPUTUM  CVP Mean:      ICU guidelines/prophylaxis active for the following:    insulin guidelines, DVT prophylaxis and ulcer prophylaxis    ASSESSMENT & PLAN:    Active Problems:  Day 4 S/P exp lap for TOA. ID -Continue metronidazole and Maxipime. WBC 14, afebrile  Bowel function - expected ileus some BS today. KUB - increased distension. Now NPO agree with NG if pt continues to vomit  Adequate Urine output. Increase activity.   Bhat out if able to get to toilet    Appreciate ICU team care  Sheldon Jaraimllo 8/12/2020 11:17 AM

## 2020-08-12 NOTE — PROGRESS NOTES
CRITICAL CARE PROGRESS NOTE      Patient:  Cecy Yeung    Unit/Bed:4D-08/008-A  YOB: 1960  MRN: [id number]   Acct: [de-identified]   PCP: Anayeli Gaona MD  Date of Admission: 8/7/2020  Chief Complaint:-Abdominal pain/hypotension    Assessment and Plan:    1. Acute Postoperative Pulmonary Insufficiency: (Resolving) patient intubated for surgical procedure-extubated on 8/11/2020 at [address]. Currently on 2 L nasal cannula. Continue to wean oxygen for pulse ox greater than 92%. 2. Severe Pneumonia: Left lower lobe, community aquired present on admission. Pseudomonas aeruginosa positive on PCR. Continue IV cefepime day 6. Continue decadron. 3. Large Left Adnexal Abscess: S/P Exploratory laparotomy with left salpingo oophorectomy, small bowel exploration, and irrigation of purulent material on 8/8/2020 for ovarian abscess per Dr. Leanne Marques. Continue IV cefepime day 6 and Flagyl, day 6.   4. Postoperative Ileus: N.p.o.  NG.  5. Leukocytosis: Steroid-induced. Trend CBC. 6. Hypervolemia: (Improving) patient is was 12 L on 8/11/2020 positive since admission. One-time dose Lasix IV on 8/11/2020 with diuresis of 3.3 L last 24 hours. 7. Normocytic Anemia:  (Stable)  No signs of active bleeding. Trend CBC. 8. Impaired Glucose Tolerance:  (Resolved) sliding scale insulin continued secondary to steroid administration. 9. GERD:  Continue PPI. 10. Obesity:  BMI 33.65. Weight loss education when appropriate. 11. Septic Shock: (Resolved) abdominal source, large left adnexal abscess. S/P fluid resuscitation. IV norepinephrine discontinued on 8/11/2020. PCT improving 8/10/2020.      INITIAL H AND P AND ICU COURSE:    (Per chart review as patient heavily sedated on mechanical ventilation)  Mark Schmidt is a 62year old  female transferred to [facility] ICU 8/8/2020 with hypotension and abdominal pain.      Patient has a significant history of former smoker-quit 2019, COPD, impaired glucose tolerance, GERD     Rodney Velasco presented to [address] [place] [address] ER with complaint of abdominal pain, fever, vaginal bleeding. Patient identifies onset vaginal bleeding with passage of clots, averaging 3-4 pads/day. Onset proximately 2 weeks ago. Patient also identifies a fever of 102.9 that started approximately 2 days ago with complaints of lower abdominal pain. She states she noted a mass coming out of her vagina and tried to pull it out unsuccessfully secondary to pain. 8/7/2020 transvaginal ultrasound noted large complex lesion in the left ovary could relate to abscess or hemorrhagic neoplasm. CT abdomen pelvis with IV contrast mild groundglass airspace opacities within the lingula suggest pneumonia. Mild fatty infiltration of liver, multiple gallstones biliary. A spiculated cystic mass within the left pelvis. Large pelvic mass which is mildly inflamed likely related to the left ovary. Evaluation occurred by OB/GYN Dr. Haley Ambrose, an Endocervical polyp was removed and sent for pathology. Patient was [facility] to [facility] ICU stepdown. 8/8/2020 I was called to evaluate patient secondary to hypotension. Patient was not responsive to 2 L of 0.9 normal saline systolic blood pressure remained 68-74 with a heart rate of 120. Levophed drip was started and patient was admitted to the ICU for further evaluation and care. 8/8/2020: Exploratory laparotomy with left salpingo oophorectomy, small bowel exploration, and irrigation of purulent material on 8/8/2020 for ovarian abscess per Dr. Loli Quiroz. Return to intensive care on mechanical ventilation. 8/12/2020: Extubated on 8/11/2020. Stable to transfer out of Intensive Care unit. Subjective:- (Last 24 hours)    Patient reports moderate symptoms of reflux with burning and nausea. Reports surgical pain is controlled. Denies cough, shortness of breath, chest pain, fever/chills.     Past medical history, family history, social history and allergies reviewed again and is unchanged since admission. ROS (12 point review of systems completed. Pertinent positives noted. Otherwise ROS is negative)      Scheduled Meds:   phosphorus replacement protocol   Other RX Placeholder    dexamethasone  4 mg Intravenous Daily    chlorhexidine  15 mL Mouth/Throat BID    famotidine (PEPCID) injection  20 mg Intravenous BID    insulin lispro  0-6 Units Subcutaneous 4 times per day    magnesium replacement protocol   Other RX Placeholder    calcium replacement protocol   Other RX Placeholder    sodium chloride flush  10 mL Intravenous 2 times per day    enoxaparin  40 mg Subcutaneous Daily    cefepime  2 g Intravenous Q12H    metroNIDAZOLE  500 mg Intravenous Q8H     Continuous Infusions:   lactated ringers 30 mL/hr at 08/11/20 0743    dextrose      norepinephrine Stopped (08/10/20 2234)     PRN Meds:. HYDROcodone-acetaminophen, fentanNYL, acetaminophen **OR** acetaminophen, potassium chloride, potassium chloride, potassium chloride **OR** potassium alternative oral replacement **OR** potassium chloride, glucose, dextrose, glucagon (rDNA), dextrose, sodium chloride flush, promethazine **OR** ondansetron     PHYSICAL EXAMINATION:  Vital signs: Temperature 97.5, respirations 15, pulse 109, blood pressure 135/77, pulse ox 95% on 2 L nasal cannula. Intake: 1674 mL's/Output:  3300 mL's. Net +11 L  Wt Readings from Last 3 Encounters:   08/11/20 221 lb 5.5 oz (100.4 kg)   07/09/20 205 lb 14.4 oz (93.4 kg)   11/07/19 209 lb 12.8 oz (95.2 kg)      Body mass index is 33.65 kg/m². CAM-ICU:   Awake and alert. General: Acutely ill-appearing obese  female resting in bed in acute distress. HEENT:  normocephalic and atraumatic. No scleral icterus. PERR  Neck: supple. No thyromegaly. Lungs: clear to auscultation, diminished bilateral bases without rhonchi/rales/wheezes. No retractions or tachypnea. Cardiac: RRR. No JVD. Abdomen: soft, distended. Nontender.   Distant bowel sounds. Surgical dressing dry and intact without shadow. Extremities:  No clubbing, cyanosis. Trace pretibial nonpitting edema & generalized upper extremity edema. Vasculature: capillary refill < 3 seconds. Palpable dorsalis pedis pulses, 2+. Skin:  Pale, warm, and dry. Psych:  Alert and oriented. Lymph:  No supraclavicular adenopathy. Neurologic:  No focal deficit. No seizures. ICU PROPHYLAXIS/THERAPY:   Stress ulcer:  [] PPI Agent  [x] H2RA [] Sucralfate [] Other:   VTE:     [x] Enoxaparin    [] Warfarin [] NOAC [] PCD Device:Bilat LE   [] Heparin: [] Subcut / [] IV     DATA :- LABS, RADIOLOGY- All radiographs, tracings, PFTs, and imaging are personally viewed and interpreted unless otherwise noted).  Telemetry: Normal sinus rhythm   Sodium 144, potassium 4.0, chloride 104, CO2 35, BUN 32, creatinine 0.4, anion gap 5.0, glucose 129, calcium 8.0, phosphorus 2.0.     WBC 14.1, hemoglobin 10.9, hematocrit 36.3, platelet count 289.  Peritoneal fluid culture demonstrates gram negative bacilli light growth.  Molecular pneumonia panel demonstrates Pseudomonas aeruginosa by PCR.  KUB demonstrates ileus. Electronically signed by KYLE Posada CNP on 8/12/2020 at 7:18 AM  CRITICAL CARE SPECIALIST  Patient seen by me. Case discussed with nurse practitioner. Patient significantly improved post extubation. Continue to treat pseudomonas aeruginosa with cefepime. Electronically signed by Shantelle Sutherland MD.

## 2020-08-12 NOTE — PLAN OF CARE
Problem: Pain:  Goal: Pain level will decrease  Description: Pain level will decrease  8/12/2020 0251 by Elia Mcdowell RN  Outcome: Ongoing  Note: Pt reports 8-9/10 pain. PRN pain meds given per MAR. Problem: Nutrition  Goal: Optimal nutrition therapy  8/12/2020 0251 by Elia Mcdowell RN  Outcome: Ongoing  Note: Pt tolerating a clear liquid diet. Problem: Discharge Planning:  Goal: Discharged to appropriate level of care  Description: Discharged to appropriate level of care  Outcome: Ongoing  Note: Pt plans to return to private residence when discharge is appropriate. Problem: Airway Clearance - Ineffective:  Goal: Ability to maintain a clear airway will improve  Description: Ability to maintain a clear airway will improve  8/12/2020 0251 by Elia Mcdowell RN  Outcome: Ongoing  Note: Pt extubated yesterday. O2 sat 95% on 2L NC. Problem: Aspiration:  Goal: Absence of aspiration  Description: Absence of aspiration  8/12/2020 0251 by Elia Mcdowell RN  Outcome: Ongoing  Note: No evidence of aspiration this far in shift. Problem: Bowel Function - Altered:  Goal: Bowel elimination is within specified parameters  Description: Bowel elimination is within specified parameters  8/12/2020 0251 by Elia Mcdowell RN  Outcome: Ongoing  Note: No BM this far in shift. Pt BS active x4. Problem: Cardiac Output - Decreased:  Goal: Hemodynamic stability will improve  Description: Hemodynamic stability will improve  8/12/2020 0251 by Elia Mcdowell RN  Outcome: Ongoing  Note: Pt vital signs within normal limits this far in shift. Problem: Pain:  Goal: Pain level will decrease  Description: Pain level will decrease  8/12/2020 0251 by Elia Mcdowell RN  Outcome: Ongoing  Note: Pt reports 8-9/10 pain. PRN pain meds given per MAR.       Problem: Serum Glucose Level - Abnormal:  Goal: Ability to maintain appropriate glucose levels will improve to within specified parameters  Description: Ability to maintain appropriate glucose levels will improve to within specified parameters  8/12/2020 0251 by Александр Mckeon RN  Outcome: Ongoing  Note: Pt is receiving humalog for elevated glucose levels. Problem: Skin Integrity - Impaired:  Goal: Will show no infection signs and symptoms  Description: Will show no infection signs and symptoms  8/12/2020 0251 by Александр Mckeon RN  Outcome: Ongoing  Note: Pt on antibiotics for know infection. Problem: Skin Integrity - Impaired:  Goal: Absence of new skin breakdown  Description: Absence of new skin breakdown  Outcome: Ongoing  Note: No evidence of new skin breakdown this far in shift. Pt turned and repositioned q 2 hr and prn. Bilateral arms and heels elevated on pillows. Problem: Infection - Central Venous Catheter-Associated Bloodstream Infection:  Goal: Will show no infection signs and symptoms  Description: Will show no infection signs and symptoms  8/12/2020 0251 by Александр Mckeon RN  Outcome: Ongoing  Note: No evidence of CVC related infection this far in shift. Problem: Infection - Surgical Site:  Goal: Will show no infection signs and symptoms  Description: Will show no infection signs and symptoms  8/12/2020 0251 by Александр Mckeon RN  Outcome: Ongoing  Note: No evidence of CVC related infection this far in shift. Problem: Urinary Elimination:  Goal: Signs and symptoms of infection will decrease  Description: Signs and symptoms of infection will decrease  Outcome: Ongoing  Note: Pt continues to olivera catheter. Will continue to evaluate for need. Problem: Skin Integrity:  Goal: Will show no infection signs and symptoms  Description: Will show no infection signs and symptoms  8/12/2020 0251 by Александр Mckeon RN  Outcome: Ongoing  Note: No evidence of CVC related infection this far in shift. Care plan reviewed with patient. Patient verbalizes understanding of the plan of care and contribute to goal setting.

## 2020-08-12 NOTE — PROGRESS NOTES
Comprehensive Nutrition Assessment    Type and Reason for Visit:  Reassess(po check)    Nutrition Recommendations/Plan:   Pt currently NPO. Diet start per  as Radha Flatness status allows. If extended  bowel rest needed & TPN started, recommend dose on 71 kg & start with 10 kcals/kg, 1,2 grams protein/kg & 20% lipid kcals. Nutrition Assessment:    Pt. nutritionally declining  AEB NPO status/inadequate oral intake d/t need for OR  surgery 8/8 exp lap, oorphorectomy, SB exploration & per RN pt had pus pocket,  & likely post op ileus. At risk for further nutrition compromise r/t altered GI function, admit with ovarian abscess, intubation this admit & extubated 8/11 and underlying medical condition (hx impaired glucose, chronic back pain). Nutrition recommendations/interventions as per above    Malnutrition Assessment:  Malnutrition Status: At risk for malnutrition (Comment)    Context:  Acute Illness     Findings of the 6 clinical characteristics of malnutrition:  Energy Intake:  5 days so far 8/12  Weight Loss:  No significant weight loss     Body Fat Loss:  No significant body fat loss     Muscle Mass Loss:  No significant muscle mass loss    Fluid Accumulation:  Unable to assess     Strength:  Not Performed    Estimated Daily Nutrient Needs:  Energy (kcal):  3517-8919 kcals (15-18 kcals/kgm wt of 94 kgm); Weight Used for Energy Requirements:  Current     Protein (g):  128+ grams/day (2+ grams/kgm IBW); Weight Used for Protein Requirements:  Ideal        Fluid (ml/day):  per MD;          Nutrition Related Findings:  Pt extubated 8/11, started CL diet, but had vomiting this morning & now NPO.  KUB today- likely post op ileus. Surgery notes if vomiting continues replace NG & NPO while awaits bowel funcion.  IVF at 50 ml/hr. meds include zofran, pepcid, flagyl & electrolyte protocol, Glucose 129      Wounds:  (abdominal incision- OR 8/8 - exp lap, oorphorectomy, SB exploration)       Current Nutrition Therapies: Diet NPO Effective Now    Anthropometric Measures:  · Height: 5' 8\" (172.7 cm)  · Current Body Weight: 206 lb 2.1 oz (93.5 kg)(8/8 bedscale, no edema)   · Admission Body Weight: 200 lb 12.8 oz (91.1 kg)(8/7, standing scale, no edema)    · Usual Body Weight: (per EMR: 11/7/19: 209# 12.8 oz, 7/9/20: 205# 14.4 oz)     · Ideal Body Weight: 140 lbs; t     · BMI: 31.3    · BMI Categories: Obese Class 1 (BMI 30.0-34. 9)       Nutrition Diagnosis:   · Inadequate oral intake related to altered GI function as evidenced by (NPO) likely pos op ileus      Nutrition Interventions:   Food and/or Nutrient Delivery:  (diet start per Dr as pt able)  Nutrition Education/Counseling:  No recommendation at this time   Coordination of Nutrition Care:  Continued Inpatient Monitoring    Goals:  adequate nutrition within 1-4 days       Nutrition Monitoring and Evaluation:      Food/Nutrient Intake Outcomes:  Diet Advancement/Tolerance  Physical Signs/Symptoms Outcomes:  Biochemical Data, GI Status, Nausea or Vomiting, Fluid Status or Edema, Hemodynamic Status, Nutrition Focused Physical Findings, Skin, Weight     Discharge Planning:     Too soon to determine     Electronically signed by Chery St RD, LD on 8/12/20 at 11:32 AM EDT    Contact: [phone]

## 2020-08-12 NOTE — PROGRESS NOTES
CRITICAL CARE PROGRESS NOTE      Patient:  Cecy Yeung    Unit/Bed:4D-08/008-A  YOB: 1960  MRN: [id number]   PCP: Anayeli Gaona MD  Date of Admission: 8/7/2020  Chief Complaint:- Abdominal Pain    Assessment and Plan:    1. Acute Postoperative Pulmonary Insufficiency: Patient intubated for surgery, she was extubated on 8/11/2020. She has been on 2L of O2 on nasal cannula since extubation with oxygen saturations in the mid to high 90s. Will continue to wean off oxygen as tolerated. 2. Septic Shock: Abdominal source, large left adnexal abscess.  S/P fluid resuscitation. Toan Gals norepinephrine discontinued on 8/11/2020.  Blood pressures have normalized.  PCT improving. 3. Severe Pneumonia: Left lower lobe, community aquired present on admission.  Pseudomonas aeruginosa positive on PCR.  Continue IV cefepime day 4. Decadron IV for 4 days, currently Day 2.  4. Large Left Adnexal Abscess: S/P Exploratory laparotomy with left salpingo oophorectomy, small bowel exploration, and irrigation of purulent material on 8/8/2020 for ovarian abscess per Dr. Keyona Nelson IV cefepime and Flagyl, day 4.  5. Hypervolemia: (Improving) Due to IV fluid resuscitation and maintenance with a net intake/output of + 12.1 L on 8/11/2020. One dose of Lasix 40 mg was ordered for diuresis. Will hold Lasix due to elevated CO2 at 35 and elevated BUN at 32. Will continue to monitor volume status. 6. Normocytic Anemia:  (Stable)  No signs of active bleeding. Hgb: 10.9  7. Impaired Glucose Tolerance:  (Resolved)    8. GERD:  Increased on 8/12/2020, Will continue PPI. And order a GI cocktail.    9. Obesity:  BMI 32.65.  Weight loss education when appropriate. INITIAL H AND P AND ICU COURSE:  Yangry Cabralremedios a 62 y. o. postmenopausal female with past medical history of COPD, chronic back pain, GERD, and impaired glucose intolerance who is being admitted for sepsis and pelvic mass.  Ms. De Jesus Maker noticed abdominal pain, fever, vaginal bleeding.  Patient identifies onset vaginal bleeding with passage of clots, averaging 3-4 pads/day. Onset proximately 2 weeks ago. Alise Valenzuela also identifies a fever of 102.9 that started approximately 2 days ago with complaints of lower abdominal pain. Xavier Watson states she noted a mass coming out of her vagina and tried to pull it out unsuccessfully secondary to pain.  2020 transvaginal ultrasound noted large complex lesion in the left ovary could relate to abscess or hemorrhagic neoplasm.  CT abdomen pelvis with IV contrast mild groundglass airspace opacities within the lingula suggest pneumonia.  Mild fatty infiltration of liver, multiple gallstones biliary.  A spiculated cystic mass within the left pelvis.  Large pelvic mass which is mildly inflamed likely related to the left ovary.     Evaluation occurred by OB/GYN Dr. Boles, an Endocervical polyp was removed and sent for pathology.  Patient was sent to [facility] ICU stepdown     2020: Patient was hypotensive and not responsive to 2 L of 0.9 normal saline systolic blood pressure remained 68-74 with a heart rate of 120.  Levophed drip was started and patient was admitted to the ICU for further evaluation and care. Past Medical History:  COPD, chronic back pain, impaired glucose tolerance, GERD  Family History: Mother: , had hypertension and asthma. Father: Living with hypertension. Social History: Former smoker 20 pack years, quit . Denies alcohol or illicit substance use.     ROS: (12 point review of systems completed.  Pertinent positives noted.  Otherwise ROS is negative)     Scheduled Meds:   insulin lispro  0-6 Units Subcutaneous 4x Daily AC & HS    GI cocktail   Oral Once    phosphorus replacement protocol   Other RX Placeholder    dexamethasone  4 mg Intravenous Daily    famotidine (PEPCID) injection  20 mg Intravenous BID    magnesium replacement protocol   Other RX Placeholder    calcium replacement protocol Other RX Placeholder    sodium chloride flush  10 mL Intravenous 2 times per day    enoxaparin  40 mg Subcutaneous Daily    cefepime  2 g Intravenous Q12H    metroNIDAZOLE  500 mg Intravenous Q8H     Continuous Infusions:   lactated ringers 30 mL/hr at 08/11/20 0743    dextrose         PHYSICAL EXAMINATION:  T:  97.5.  P:  109. RR:  15. B/P:  135/77. O2 Sat:  95.  I/O:  1674/3300 NET: +11.1 L  Body mass index is 33.65 kg/m². GCS:   15  General:   Acutely ill appearing resting in bed with nasal cannula in place. HEENT:  normocephalic and atraumatic. No scleral icterus. PERR  Neck: supple. Lungs: Slightly diminished breath sounds at bilateral bases. No wheezing, rhonchi, or rales. No retractions  Cardiac: RRR. No JVD. Abdomen: Mildly distended. Nontender. Hypoactive bowel sounds. Clean, dry, and intact periumbilical dressing in place covering surgical site. No surrounding erythema. Extremities:  No clubbing, cyanosis. Trace non-pitting distal edema in all four extremities. Vasculature: capillary refill < 3 seconds. 2+ dorsalis pedis pulses. Skin:  Pale, warm and dry. Psych:  Alert and oriented x3. Affect appropriate  Lymph:  No supraclavicular adenopathy. Neurologic:  No focal deficit. No seizures. Data: (All radiographs, tracings, PFTs, and imaging are personally viewed and interpreted unless otherwise noted). CBC:   Ref.  Range 8/11/2020 04:37 8/12/2020 06:10   WBC Latest Ref Range: 4.8 - 10.8 thou/mm3 8.6 14.1 (H)   RBC Latest Ref Range: 4.20 - 5.40 mill/mm3 3.15 (L) 3.67 (L)   Hemoglobin Quant Latest Ref Range: 12.0 - 16.0 gm/dl 9.5 (L) 10.9 (L)   Hematocrit Latest Ref Range: 37.0 - 47.0 % 32.0 (L) 36.3 (L)   MCV Latest Ref Range: 81.0 - 99.0 fL 101.6 (H) 98.9   MCH Latest Ref Range: 26.0 - 33.0 pg 30.2 29.7   MCHC Latest Ref Range: 32.2 - 35.5 gm/dl 29.7 (L) 30.0 (L)   MPV Latest Ref Range: 9.4 - 12.4 fL 10.6 10.3   RDW-CV Latest Ref Range: 11.5 - 14.5 % 13.7 13.2   RDW-SD Latest Ref Range: 35.0 - 45.0 fL 50.9 (H) 48.6 (H)   Platelet Count Latest Ref Range: 130 - 400 thou/mm3 214 309   Lymphocytes Absolute Latest Ref Range: 1.0 - 4.8 thou/mm3  0.8 (L)   Monocytes Absolute Latest Ref Range: 0.4 - 1.3 thou/mm3  0.9   Eosinophils Absolute Latest Ref Range: 0.0 - 0.4 thou/mm3  0.0   Basophils Absolute Latest Ref Range: 0.0 - 0.1 thou/mm3  0.0   Seg Neutrophils Latest Units: %  77.4   Segs Absolute Latest Ref Range: 1.8 - 7.7 thou/mm3  10.9 (H)   Lymphocytes Latest Units: %  6.0   Monocytes Latest Units: %  6.5   Eosinophils Latest Units: %  0.0   Basophils Latest Units: %  0.1   Immature Grans (Abs) Latest Ref Range: 0.00 - 0.07 thou/mm3  1.41 (H)   Immature Granulocytes Latest Units: %  10.0   Nucleated Red Blood Cells Latest Units: /100 wbc  0     BMP:   Ref. Range 8/12/2020 06:10   Sodium Latest Ref Range: 135 - 145 meq/L 144   Potassium Latest Ref Range: 3.5 - 5.2 meq/L 4.0   Chloride Latest Ref Range: 98 - 111 meq/L 104   CO2 Latest Ref Range: 23 - 33 meq/L 35 (H)   BUN Latest Ref Range: 7 - 22 mg/dL 32 (H)   Creatinine Latest Ref Range: 0.4 - 1.2 mg/dL 0.4   Anion Gap Latest Ref Range: 8.0 - 16.0 meq/L 5.0 (L)   Est, Glom Filt Rate Latest Units: ml/min/1.73m2 >90   Glucose Latest Ref Range: 70 - 108 mg/dL 129 (H)   Calcium Latest Ref Range: 8.5 - 10.5 mg/dL 8.0 (L)   Phosphorus Latest Ref Range: 2.4 - 4.7 mg/dL 2.0 (L)     Telemetry:   Sinus tachycardia at 103 bpm.      Seen with multidisciplinary ICU team.  Meets Continued ICU Level Care Criteria:    [] Yes   [x] No - Transfer Planned to listed location: 4K  [] HOSPITALIST CONTACTED-      Case and plan discussed with Dr. Nannette Solorio.         Electronically signed by Inocencia Chavez

## 2020-08-12 NOTE — PLAN OF CARE
Problem: Nutrition  Goal: Optimal nutrition therapy  8/12/2020 1130 by Stanley Cantu RD, LD  Outcome: Ongoing  Nutrition Problem #1: Inadequate oral intake  Intervention: Food and/or Nutrient Delivery: (diet start per Dr as pt able)  Nutritional Goals: adequate nutrition within 1-4 days

## 2020-08-12 NOTE — FLOWSHEET NOTE
08/12/20 1051   Handoff   Communication Given Transfer Handoff   Oncoming Nurse/Offgoing Nurse Yoanna Campbell 3b/[facility] 4d   Handoff Communication Telephone   Time Handoff Given 1050   Attempt made to update patient's son Erinn Long on room change. Unable to be reached at this time.

## 2020-08-12 NOTE — PROGRESS NOTES
In to see pt. She is conversant. Reports sm amount of flatus. abd tympanic and some tender c/w ileus    Will give toradol and dulcolax supppository.     Vicky Boles 8/12/2020 4:45 PM

## 2020-08-12 NOTE — CARE COORDINATION
8/12/20, 2:26 PM EDT    DISCHARGE ON GOING Sheryl Accuvant day: 5  Location: [facility] Reason for admit: Ovarian mass [N83.8]  Ovarian mass [N83.8]   Procedure:  8/7 CT Abd/pelvis: Lingular pneumonia; cholelithiasis; Large pelvic mass which is mildly inflamed likely relating to the left ovary. Benign and malignant etiologies are possible  8/7 US Abd/pelvis: Large complex lesion in the left ovary could relate to abscess or hemorrhagic neoplasm either benign or malignant. Lack of significant ascites mitigates against malignancy  8/7 Transvaginal US: Large pelvic mass which is mildly inflamed likely relating to the left ovary. Benign and malignant etiologies are possible  8/8 LAPAROTOMY EXPLORATORY, LEFT SALPINGO OOPHORECTOMY, SMALL BOWEL EXPLORATION, irrigation of purulent material  8/8 Intubated  8/8 CVC RIJ  8/10 KUB: Non obstructive bowel gas pattern; no evidence of ileus  8/11 Extubated  8/12 KUB: Increased gaseous distention throughout the small bowel loops. This is likely related to postsurgical ileus although a bowel obstruction can't be excluded  Treatment Plan of Care: POD #4. Did not tolerate clears. C/o abdominal pain and had large emesis this morning. KUB ordered and GI cocktail ordered. Now NPO. Sats 93% on 2L O2. Afebrile. 's. Ox4. Dietitian consulted. PT/OT. Respiratory culture by PCR +pseudomonas aeruginosa. Peritoneal culture +fusobacterium nucleatum. COVID 19 was negative. Cardiac monitoring, I&O, daily weight, wound care, SCDs, olivera care. IVF, IV cefepime, IV decadron 4 mg daily, lovenox, pepcid, prn IV fentanyl, SSI ACHS, IV flagyl, prn IV morphine, Electrolyte replacement protocols. WBC 14.1, hgb 10.9. Barriers to Discharge: not medically ready  PCP: Anayeli Gaona MD  Readmission Risk Score: 16%  Patient Goals/Plan/Treatment Preferences: Home alone with [facility] vs home with son in [place]. SW on case.

## 2020-08-13 ENCOUNTER — APPOINTMENT (OUTPATIENT)
Dept: GENERAL RADIOLOGY | Age: 60
DRG: 853 | End: 2020-08-13
Payer: COMMERCIAL

## 2020-08-13 LAB
ALLEN TEST: POSITIVE
ANION GAP SERPL CALCULATED.3IONS-SCNC: 5 MEQ/L (ref 8–16)
BASE EXCESS (CALCULATED): 10.8 MMOL/L (ref -2.5–2.5)
BLOOD CULTURE, ROUTINE: NORMAL
BLOOD CULTURE, ROUTINE: NORMAL
BUN BLDV-MCNC: 40 MG/DL (ref 7–22)
CALCIUM SERPL-MCNC: 7.8 MG/DL (ref 8.5–10.5)
CHLORIDE BLD-SCNC: 102 MEQ/L (ref 98–111)
CO2: 37 MEQ/L (ref 23–33)
COLLECTED BY:: ABNORMAL
CREAT SERPL-MCNC: 0.5 MG/DL (ref 0.4–1.2)
DEVICE: ABNORMAL
ERYTHROCYTE [DISTWIDTH] IN BLOOD BY AUTOMATED COUNT: 13.4 % (ref 11.5–14.5)
ERYTHROCYTE [DISTWIDTH] IN BLOOD BY AUTOMATED COUNT: 49.8 FL (ref 35–45)
GFR SERPL CREATININE-BSD FRML MDRD: > 90 ML/MIN/1.73M2
GLUCOSE BLD-MCNC: 111 MG/DL (ref 70–108)
GLUCOSE BLD-MCNC: 114 MG/DL (ref 70–108)
GLUCOSE BLD-MCNC: 115 MG/DL (ref 70–108)
GLUCOSE BLD-MCNC: 135 MG/DL (ref 70–108)
GLUCOSE BLD-MCNC: 159 MG/DL (ref 70–108)
HCO3: 38 MMOL/L (ref 23–28)
HCT VFR BLD CALC: 34.7 % (ref 37–47)
HEMOGLOBIN: 10.2 GM/DL (ref 12–16)
IFIO2: 2
MCH RBC QN AUTO: 29.7 PG (ref 26–33)
MCHC RBC AUTO-ENTMCNC: 29.4 GM/DL (ref 32.2–35.5)
MCV RBC AUTO: 100.9 FL (ref 81–99)
O2 SATURATION: 91 %
PCO2: 60 MMHG (ref 35–45)
PH BLOOD GAS: 7.41 (ref 7.35–7.45)
PHOSPHORUS: 2.1 MG/DL (ref 2.4–4.7)
PLATELET # BLD: 265 THOU/MM3 (ref 130–400)
PMV BLD AUTO: 11 FL (ref 9.4–12.4)
PO2: 62 MMHG (ref 71–104)
POTASSIUM SERPL-SCNC: 4.1 MEQ/L (ref 3.5–5.2)
PROCALCITONIN: 2.12 NG/ML (ref 0.01–0.09)
RBC # BLD: 3.44 MILL/MM3 (ref 4.2–5.4)
SODIUM BLD-SCNC: 144 MEQ/L (ref 135–145)
SOURCE, BLOOD GAS: ABNORMAL
WBC # BLD: 15.1 THOU/MM3 (ref 4.8–10.8)

## 2020-08-13 PROCEDURE — 99024 POSTOP FOLLOW-UP VISIT: CPT | Performed by: SURGERY

## 2020-08-13 PROCEDURE — 36600 WITHDRAWAL OF ARTERIAL BLOOD: CPT

## 2020-08-13 PROCEDURE — 84145 PROCALCITONIN (PCT): CPT

## 2020-08-13 PROCEDURE — 6370000000 HC RX 637 (ALT 250 FOR IP): Performed by: INTERNAL MEDICINE

## 2020-08-13 PROCEDURE — 2580000003 HC RX 258: Performed by: OBSTETRICS & GYNECOLOGY

## 2020-08-13 PROCEDURE — 2709999900 HC NON-CHARGEABLE SUPPLY

## 2020-08-13 PROCEDURE — 84100 ASSAY OF PHOSPHORUS: CPT

## 2020-08-13 PROCEDURE — 2500000003 HC RX 250 WO HCPCS: Performed by: NURSE PRACTITIONER

## 2020-08-13 PROCEDURE — 97110 THERAPEUTIC EXERCISES: CPT

## 2020-08-13 PROCEDURE — 99254 IP/OBS CNSLTJ NEW/EST MOD 60: CPT | Performed by: INTERNAL MEDICINE

## 2020-08-13 PROCEDURE — 6360000002 HC RX W HCPCS: Performed by: INTERNAL MEDICINE

## 2020-08-13 PROCEDURE — 82803 BLOOD GASES ANY COMBINATION: CPT

## 2020-08-13 PROCEDURE — 97163 PT EVAL HIGH COMPLEX 45 MIN: CPT

## 2020-08-13 PROCEDURE — 36568 INSJ PICC <5 YR W/O IMAGING: CPT

## 2020-08-13 PROCEDURE — 6360000002 HC RX W HCPCS: Performed by: OBSTETRICS & GYNECOLOGY

## 2020-08-13 PROCEDURE — 1200000000 HC SEMI PRIVATE

## 2020-08-13 PROCEDURE — 99232 SBSQ HOSP IP/OBS MODERATE 35: CPT | Performed by: INTERNAL MEDICINE

## 2020-08-13 PROCEDURE — 80048 BASIC METABOLIC PNL TOTAL CA: CPT

## 2020-08-13 PROCEDURE — 76937 US GUIDE VASCULAR ACCESS: CPT

## 2020-08-13 PROCEDURE — 94760 N-INVAS EAR/PLS OXIMETRY 1: CPT

## 2020-08-13 PROCEDURE — 71045 X-RAY EXAM CHEST 1 VIEW: CPT

## 2020-08-13 PROCEDURE — 6360000002 HC RX W HCPCS: Performed by: NURSE PRACTITIONER

## 2020-08-13 PROCEDURE — C1751 CATH, INF, PER/CENT/MIDLINE: HCPCS

## 2020-08-13 PROCEDURE — 85027 COMPLETE CBC AUTOMATED: CPT

## 2020-08-13 PROCEDURE — 2580000003 HC RX 258: Performed by: INTERNAL MEDICINE

## 2020-08-13 PROCEDURE — 2700000000 HC OXYGEN THERAPY PER DAY

## 2020-08-13 PROCEDURE — 2500000003 HC RX 250 WO HCPCS: Performed by: INTERNAL MEDICINE

## 2020-08-13 PROCEDURE — 97530 THERAPEUTIC ACTIVITIES: CPT

## 2020-08-13 PROCEDURE — 82948 REAGENT STRIP/BLOOD GLUCOSE: CPT

## 2020-08-13 PROCEDURE — 2500000003 HC RX 250 WO HCPCS: Performed by: OBSTETRICS & GYNECOLOGY

## 2020-08-13 PROCEDURE — 36415 COLL VENOUS BLD VENIPUNCTURE: CPT

## 2020-08-13 RX ORDER — SODIUM CHLORIDE 0.9 % (FLUSH) 0.9 %
10 SYRINGE (ML) INJECTION PRN
Status: DISCONTINUED | OUTPATIENT
Start: 2020-08-13 | End: 2020-08-13 | Stop reason: SDUPTHER

## 2020-08-13 RX ORDER — LIDOCAINE HYDROCHLORIDE 10 MG/ML
5 INJECTION, SOLUTION EPIDURAL; INFILTRATION; INTRACAUDAL; PERINEURAL ONCE
Status: DISCONTINUED | OUTPATIENT
Start: 2020-08-13 | End: 2020-08-18 | Stop reason: HOSPADM

## 2020-08-13 RX ORDER — IPRATROPIUM BROMIDE AND ALBUTEROL SULFATE 2.5; .5 MG/3ML; MG/3ML
1 SOLUTION RESPIRATORY (INHALATION)
Status: DISCONTINUED | OUTPATIENT
Start: 2020-08-13 | End: 2020-08-18 | Stop reason: HOSPADM

## 2020-08-13 RX ORDER — SODIUM CHLORIDE 0.9 % (FLUSH) 0.9 %
10 SYRINGE (ML) INJECTION EVERY 12 HOURS SCHEDULED
Status: DISCONTINUED | OUTPATIENT
Start: 2020-08-13 | End: 2020-08-13 | Stop reason: SDUPTHER

## 2020-08-13 RX ORDER — FUROSEMIDE 10 MG/ML
20 INJECTION INTRAMUSCULAR; INTRAVENOUS ONCE
Status: COMPLETED | OUTPATIENT
Start: 2020-08-13 | End: 2020-08-13

## 2020-08-13 RX ADMIN — KETOROLAC TROMETHAMINE 15 MG: 30 INJECTION, SOLUTION INTRAMUSCULAR at 19:00

## 2020-08-13 RX ADMIN — FUROSEMIDE 20 MG: 10 INJECTION, SOLUTION INTRAMUSCULAR; INTRAVENOUS at 12:31

## 2020-08-13 RX ADMIN — CEFEPIME 2 G: 2 INJECTION, POWDER, FOR SOLUTION INTRAMUSCULAR; INTRAVENOUS at 14:39

## 2020-08-13 RX ADMIN — SODIUM CHLORIDE, PRESERVATIVE FREE 10 ML: 5 INJECTION INTRAVENOUS at 20:06

## 2020-08-13 RX ADMIN — SODIUM CHLORIDE, PRESERVATIVE FREE 10 ML: 5 INJECTION INTRAVENOUS at 09:19

## 2020-08-13 RX ADMIN — ONDANSETRON 4 MG: 2 INJECTION INTRAMUSCULAR; INTRAVENOUS at 20:17

## 2020-08-13 RX ADMIN — METRONIDAZOLE 500 MG: 500 INJECTION, SOLUTION INTRAVENOUS at 08:59

## 2020-08-13 RX ADMIN — ENOXAPARIN SODIUM 40 MG: 40 INJECTION SUBCUTANEOUS at 20:04

## 2020-08-13 RX ADMIN — FAMOTIDINE 20 MG: 10 INJECTION, SOLUTION INTRAVENOUS at 08:58

## 2020-08-13 RX ADMIN — METRONIDAZOLE 500 MG: 500 INJECTION, SOLUTION INTRAVENOUS at 00:05

## 2020-08-13 RX ADMIN — DEXAMETHASONE SODIUM PHOSPHATE 4 MG: 4 INJECTION, SOLUTION INTRAMUSCULAR; INTRAVENOUS at 08:58

## 2020-08-13 RX ADMIN — SODIUM PHOSPHATE, MONOBASIC, MONOHYDRATE 15 MMOL: 276; 142 INJECTION, SOLUTION INTRAVENOUS at 15:10

## 2020-08-13 RX ADMIN — SODIUM CHLORIDE, POTASSIUM CHLORIDE, SODIUM LACTATE AND CALCIUM CHLORIDE: 600; 310; 30; 20 INJECTION, SOLUTION INTRAVENOUS at 05:18

## 2020-08-13 RX ADMIN — KETOROLAC TROMETHAMINE 15 MG: 30 INJECTION, SOLUTION INTRAMUSCULAR at 05:16

## 2020-08-13 RX ADMIN — KETOROLAC TROMETHAMINE 15 MG: 30 INJECTION, SOLUTION INTRAMUSCULAR at 12:31

## 2020-08-13 RX ADMIN — METRONIDAZOLE 500 MG: 500 INJECTION, SOLUTION INTRAVENOUS at 17:51

## 2020-08-13 RX ADMIN — ACETAZOLAMIDE 500 MG: 500 INJECTION, POWDER, LYOPHILIZED, FOR SOLUTION INTRAVENOUS at 20:06

## 2020-08-13 RX ADMIN — FAMOTIDINE 20 MG: 10 INJECTION, SOLUTION INTRAVENOUS at 20:04

## 2020-08-13 RX ADMIN — MORPHINE SULFATE 2 MG: 2 INJECTION, SOLUTION INTRAMUSCULAR; INTRAVENOUS at 07:38

## 2020-08-13 ASSESSMENT — PAIN DESCRIPTION - PROGRESSION
CLINICAL_PROGRESSION: NOT CHANGED

## 2020-08-13 ASSESSMENT — PAIN DESCRIPTION - DESCRIPTORS
DESCRIPTORS: ACHING
DESCRIPTORS: ACHING;SHARP;BURNING
DESCRIPTORS: ACHING

## 2020-08-13 ASSESSMENT — PAIN DESCRIPTION - FREQUENCY
FREQUENCY: CONTINUOUS

## 2020-08-13 ASSESSMENT — PAIN DESCRIPTION - PAIN TYPE
TYPE: ACUTE PAIN

## 2020-08-13 ASSESSMENT — PAIN SCALES - GENERAL
PAINLEVEL_OUTOF10: 7
PAINLEVEL_OUTOF10: 10
PAINLEVEL_OUTOF10: 7
PAINLEVEL_OUTOF10: 7
PAINLEVEL_OUTOF10: 6
PAINLEVEL_OUTOF10: 0

## 2020-08-13 ASSESSMENT — PAIN DESCRIPTION - ORIENTATION
ORIENTATION: MID

## 2020-08-13 ASSESSMENT — PAIN DESCRIPTION - LOCATION
LOCATION: INCISION

## 2020-08-13 ASSESSMENT — PAIN DESCRIPTION - ONSET
ONSET: ON-GOING

## 2020-08-13 ASSESSMENT — PAIN - FUNCTIONAL ASSESSMENT
PAIN_FUNCTIONAL_ASSESSMENT: PREVENTS OR INTERFERES SOME ACTIVE ACTIVITIES AND ADLS
PAIN_FUNCTIONAL_ASSESSMENT: PREVENTS OR INTERFERES WITH MANY ACTIVE NOT PASSIVE ACTIVITIES
PAIN_FUNCTIONAL_ASSESSMENT: PREVENTS OR INTERFERES SOME ACTIVE ACTIVITIES AND ADLS

## 2020-08-13 NOTE — PROGRESS NOTES
Intensive Care Unit  Ob/Gyn  Attending Progress Note          SUBJECTIVE:  Events of the last 24 hours.  Passing gas this am. Increased pain this am.   Toleratins ice chips    OBJECTIVE:    Current Medications:  Current Facility-Administered Medications: phosphorus replacement protocol, , Other, RX Placeholder  magnesium replacement protocol, , Other, RX Placeholder  lidocaine PF 1 % injection 5 mL, 5 mL, Intradermal, Once  sodium phosphate 15 mmol in dextrose 5 % 250 mL IVPB, 15 mmol, Intravenous, Once  acetaZOLAMIDE (DIAMOX) 500 mg in dextrose 5 % 100 mL IVPB, 500 mg, Intravenous, Once  albuterol sulfate  (90 Base) MCG/ACT inhaler 2 puff, 2 puff, Inhalation, Q6H PRN  morphine (PF) injection 2 mg, 2 mg, Intravenous, Q2H PRN **OR** morphine injection 4 mg, 4 mg, Intravenous, Q2H PRN  insulin lispro (HUMALOG) injection vial 0-6 Units, 0-6 Units, Subcutaneous, Q6H  ketorolac (TORADOL) injection 15 mg, 15 mg, Intravenous, Q6H  bisacodyl (DULCOLAX) suppository 10 mg, 10 mg, Rectal, Daily PRN  [Held by provider] HYDROcodone-acetaminophen (NORCO) 7.5-325 MG per tablet 1 tablet, 1 tablet, Oral, Q6H PRN  fentaNYL (SUBLIMAZE) injection 25 mcg, 25 mcg, Intravenous, Q4H PRN  acetaminophen (TYLENOL) tablet 650 mg, 650 mg, Oral, Q4H PRN **OR** acetaminophen (TYLENOL) suppository 650 mg, 650 mg, Rectal, Q4H PRN  lactated ringers infusion, , Intravenous, Continuous  famotidine (PEPCID) injection 20 mg, 20 mg, Intravenous, BID  calcium replacement protocol, , Other, RX Placeholder  potassium chloride 10 mEq/100 mL IVPB (Peripheral Line), 10 mEq, Intravenous, PRN  potassium chloride 20 mEq/50 mL IVPB (Central Line), 20 mEq, Intravenous, PRN  potassium chloride (KLOR-CON M) extended release tablet 40 mEq, 40 mEq, Oral, PRN **OR** potassium bicarb-citric acid (EFFER-K) effervescent tablet 40 mEq, 40 mEq, Oral, PRN **OR** potassium chloride 10 mEq/100 mL IVPB (Peripheral Line), 10 mEq, Intravenous, PRN  glucose (GLUTOSE) 40 % 08/13/2020    RDW 13.2 03/05/2018     08/13/2020     BMP:    Lab Results   Component Value Date     08/13/2020    K 4.1 08/13/2020    K 3.5 08/08/2020     08/13/2020    CO2 37 08/13/2020    BUN 40 08/13/2020    CREATININE 0.5 08/13/2020    CALCIUM 7.8 08/13/2020    LABGLOM >90 08/13/2020    GLUCOSE 115 08/13/2020     Urine Culture:  neg  Blood Culture: neg  Peritoneal culture fusobacterium  Sputum Culture:  No components found for: CSPUTUM  CVP Mean:      ICU guidelines/prophylaxis active for the following:    insulin guidelines, DVT prophylaxis and ulcer prophylaxis    ASSESSMENT & PLAN:    Active Problems:  Day 5 S/P exp lap for TOA. ID -Continue metronidazole and Maxipime. WBC 14, afebrile  Bowel function - expected ileus some BS today. Clears today. [facility] will only be for a couple of days. When Bowel fxn returns we can transition to PO antibiotics flagyl and doxy. Discharge will depend on functional status once bowel function returns. . Home health vs home with son. Doubt long term need for [place] unless functional status does not improve over the next few days. Adequate Urine output.     Increase activity./ Physical therapy  Bhat out if able to get to toilet    Appreciate ICU team care  Dangelo Lechuga 8/13/2020 2:22 PM

## 2020-08-13 NOTE — CONSULTS
[facility]      Patient - Tatiana Hardwick   MRN -  [id number]   [place] # - [de-identified]   - 1960      Date of Admission -  2020 10:16 AM  Date of evaluation -  2020  Room - [facility] Day - 6  Consulting - Willard Celaya MD Primary Care Physician - George Patton MD     Problem List      Active Hospital Problems    Diagnosis Date Noted    Septic shock ([facility] [address] [A41.9, R65.21]     Ovarian abscess [N70.92] 2020    Panlobular emphysema ([facility] [address] [J43.1] 03/15/2018     Reason for Consult    CO2 Retention  HPI   History Obtained From: Patient and electronic medical record. Tatiana Hardwick is a 62 y.o. female presented to [facility] ER on 2020 with complaints of abdominal pain, fever, vaginal bleeding. Transvaginal ultrasound noted large complex lesion in the left ovary could relate to abscess or hemorrhagic neoplasm. Evaluation occurred by Dr. Lana Chase, and endocervical polyp was removed and sent for pathology. Patient was sent to [facility] ICU stepdown. On 2020, patient was hypotensive and not responsive to 2 L of 0.09 normal saline systolic blood pressure remained 60-74 with a heart rate of 120. Levophed drip was started and patient was admitted to the ICU for further evaluation and care. On 2020, exploratory laparotomy with left salpingo-oophorectomy and small bowel exploration for ovarian abscess per Dr. Lana Chase. Patient was returned to ICU on mechanical ventilation. Patient was extubated on 2020 and on 2020 patient was stable to be transferred out of ICU. Pulmonary medicine was consulted because of a high pCO2 on ABG. During our encounter, patient complains of shortness of breath and a dry cough. Patient denies chest pain, hemoptysis, leg swelling. Patient states that she noticed herself snoring at night. She states that she gets up in the middle of the night to use the restroom.   Patient also states that she ondansetron  IV Drips/Infusions   lactated ringers 75 mL/hr at 20 0518    dextrose       Home Medications  Medications Prior to Admission: famotidine (PEPCID) 20 MG tablet, Take 20 mg by mouth 2 times daily  albuterol sulfate  (90 Base) MCG/ACT inhaler, INHALE 2 PUFFS BY MOUTH INTO THE LUNGS EVERY 6 HOURS AS NEEDED FOR WHEEZING  nicotine polacrilex (EQL NICOTINE) 4 MG lozenge, Take 1 lozenge by mouth as needed for Smoking cessation  Cyclobenzaprine HCl (FLEXERIL PO), Take  by mouth nightly. oxyCODONE-acetaminophen (PERCOCET) 7.5-325 MG per tablet, Take 1 tablet by mouth 2 times daily as needed. ibuprofen (ADVIL;MOTRIN) 200 MG tablet, Take 400 mg by mouth every 8 hours as needed. Pseudoephedrine-Guaifenesin (MUCINEX D PO), Take by mouth  Diet    DIET CLEAR LIQUID; Allergies    Latex; Flu virus vaccine;  Fluzone [influenza vac split quad]; Sulfa antibiotics; and Lead (elemental)  Social History     Social History     Socioeconomic History    Marital status: Single     Spouse name: Not on file    Number of children: 1    Years of education: 15    Highest education level: High school graduate   Occupational History    Not on file   Social Needs    Financial resource strain: Not hard at all   [employer] insecurity     Worry: Never true     Inability: Never true   [employer] needs     Medical: No     Non-medical: No   Tobacco Use    Smoking status: Former Smoker     Packs/day: 0.50     Years: 40.00     Pack years: 20.00     Types: Cigarettes     Start date: 1978     Last attempt to quit: 2018     Years since quittin.3    Smokeless tobacco: Never Used   Substance and Sexual Activity    Alcohol use: No     Alcohol/week: 0.0 standard drinks    Drug use: No    Sexual activity: Never   Lifestyle    Physical activity     Days per week: Not on file     Minutes per session: Not on file    Stress: Not on file   Relationships    Social connections     Talks on phone: Not on file Gets together: Not on file     Attends [employer] service: Not on file     Active member of club or organization: Not on file     Attends meetings of clubs or organizations: Not on file     Relationship status: Not on file    Intimate partner violence     Fear of current or ex partner: Not on file     Emotionally abused: Not on file     Physically abused: Not on file     Forced sexual activity: Not on file   Other Topics Concern    Not on file   Social History Narrative    Not on file     Family History          Problem Relation Age of Onset    High Blood Pressure Mother     Asthma Mother     High Blood Pressure Father     Diabetes Maternal Grandmother      Sleep History    Never diagnosed with sleep apnea in the past.  Occupational history   Occupation:  She is current working: Yes  Type of profession: full time job doing . History of tobacco smoking:Yes  Amount of tobacco smokin.5 PPD. Years of tobacco smokin.                                    Quit smoking: Yes. Quit ESO   Current smoker: No.         History of recreational or IV drug use in the past:NO     History of exposure to coal mines/coal dust: NO  History of exposure to foundry dust/welding: NO  History of exposure to quarry/silica/sandblasting: NO  History of exposure to asbestos/working with breaks/ships: NO  History of exposure to farm dust: NO  History of recent travel to long distances: NO  History of exposure to birds, pigeons, or chickens in the past:NO      History of pulmonary embolism in the past: No            History of DVT in the past:No            [place] of systems   Review of Systems   General/Constitutional: No recent loss of weight or appetite changes. No fever or chills. HENT: Negative. Eyes: Negative. Upper respiratory tract: No nasal stuffiness or post nasal drip. Lower respiratory tract/ lungs: Positive for SOB and dry cough. No sputum production.  No hemoptysis. Cardiovascular: No palpitations or chest pain. Gastrointestinal: No nausea or vomiting. Neurological: No focal neurologiacal weakness. Extremities: No edema. Musculoskeletal: No complaints. Genitourinary: No complaints. Hematological: Negative. Psychiatric/Behavioral: Negative. Skin: No itching. Vitals     height is 5' 8\" (1.727 m) and weight is 221 lb 14.4 oz (100.7 kg). Her oral temperature is 98 °F (36.7 °C). Her blood pressure is 150/82 (abnormal) and her pulse is 103. Her respiration is 18 and oxygen saturation is 95%. Body mass index is 33.74 kg/m². SUPPLEMENTAL O2: O2 Flow Rate (L/min): 1.5 L/min     I/O        Intake/Output Summary (Last 24 hours) at 8/13/2020 1417  Last data filed at 8/13/2020 0518  Gross per 24 hour   Intake 1287.68 ml   Output 1250 ml   Net 37.68 ml     I/O last 3 completed shifts: In: 1612.7 [P.O.:200; I.V.:1412.7]  Out: 1550 [RGYIL:6920]   Patient Vitals for the past 96 hrs (Last 3 readings):   Weight   08/13/20 0600 221 lb 14.4 oz (100.7 kg)   08/11/20 0400 221 lb 5.5 oz (100.4 kg)       Exam   Nursing note and vitals reviewed. Constitutional: Patient is oriented to person, place, and time. Patient appears well-developed and well-nourished. No distress on 1.5 lpm of O2 via NC. Head: Normocephalic and atraumatic. Right Ear: External ear normal.   Left Ear: External ear normal.   Mouth/Throat: Oropharynx is clear and moist. No oropharyngeal exudate. No oral thrush. Eyes: Conjunctivae are normal. Pupils are equal, round, and reactive to light. Right eye exhibits no discharge. Left eye exhibits no discharge. No scleral icterus. Neck: Neck supple. No JVD present. No tracheal deviation present. No thyromegaly present. Malampatti class IV. Neck size of 17.5 inches. Cardiovascular: Normal rate, regular rhythm, normal heart sounds. Exam reveals no gallop and no friction rub. No murmur heard. Pulmonary/Chest: Effort normal. No stridor.  No respiratory distress. No wheezes. No rales. Patient exhibits no tenderness. Decreased breath sounds on the left lung base. Abdominal: Soft. Bowel sounds are normal. Patient exhibits no distension and no mass. No tenderness. Patient has no rebound and no guarding. Musculoskeletal: Normal range of motion. Extremities: Patient exhibits no edema and no tenderness. Lymphadenopathy: No cervical adenopathy. Neurological: Patient is alert and oriented to person, place, and time. Skin: Skin is warm and dry. No rash noted. Patient is not diaphoretic. Psychiatric: Patient  has a normal mood and affect. Patient behavior is normal.       Labs  - Old records and notes have been reviewed in [facility]   ABG  Lab Results   Component Value Date    PH 7.41 08/13/2020    PO2 62 08/13/2020    PCO2 60 08/13/2020    HCO3 38 08/13/2020    O2SAT 91 08/13/2020     Lab Results   Component Value Date    IFIO2 2 08/13/2020    MODE PC 08/10/2020    SETPEEP 6.0 08/10/2020     CBC  Recent Labs     08/11/20  0437 08/12/20  0610 08/13/20  0514   WBC 8.6 14.1* 15.1*   RBC 3.15* 3.67* 3.44*   HGB 9.5* 10.9* 10.2*   HCT 32.0* 36.3* 34.7*   .6* 98.9 100.9*   MCH 30.2 29.7 29.7   MCHC 29.7* 30.0* 29.4*    309 265   MPV 10.6 10.3 11.0      BMP  Recent Labs     08/11/20  0437 08/12/20  0610 08/13/20  0514    144 144   K 4.6 4.0 4.1    104 102   CO2 29 35* 37*   BUN 28* 32* 40*   CREATININE 0.5 0.4 0.5   GLUCOSE 141* 129* 115*   MG 2.2  --   --    PHOS 2.0* 2.0* 2.1*   CALCIUM 7.5* 8.0* 7.8*     LFT  No results for input(s): AST, ALT, ALB, BILITOT, ALKPHOS, LIPASE in the last 72 hours. Invalid input(s): AMYLASE  TROP  No results found for: TROPONINT  BNP  No results for input(s): BNP in the last 72 hours. Lactic Acid  No results for input(s): LACTA in the last 72 hours. INR  No results for input(s): INR, PROTIME in the last 72 hours. PTT  No results for input(s): APTT in the last 72 hours.   Glucose  Recent Labs 08/12/20  2100 08/13/20  0512 08/13/20  0801   POCGLU 121* 114* 111*     UA No results for input(s): SPECGRAV, PHUR, COLORU, CLARITYU, MUCUS, PROTEINU, BLOODU, RBCUA, WBCUA, BACTERIA, NITRU, GLUCOSEU, BILIRUBINUR, UROBILINOGEN, KETUA, LABCAST, LABCASTTY, AMORPHOS in the last 72 hours. Invalid input(s): CRYSTALS. PFTs               Sleep studies   None in Epic. Cultures    Pneumonia Panel, Molecular - 8/8/2020   Source ET aspirates   Pseudomonas aeruginosa by PCR DetectedAbnormal      Culture, Urine - 8/8/2020   Urine Culture, Routine---No growth-preliminary No growth     Culture, Anaerobic and Aerobic - 8/8/2020   Organism Abnormal----Fusobacterium nucleatum     COVID-19 - 8/7/2020   SARS-CoV-2, NAAT NOT DETECTED     EKG       Echocardiogram   None in Epic. Radiology    CXR  8/13/2020   XR CHEST PORTABLE   Impression:  Minimal opacity in the left lung base possible atelectasis, minimal infiltrate or effusion. CT Scans  (See actual reports for details)    8/7/2020   CT ABDOMEN PELVIS W IV CONTRAST   Impression:  Lingular pneumonia. Cholelithiasis. Large pelvic mass which is mildly inflamed likely relating to the left ovary. Benign and malignant etiologies are possible.          Assessment   -Acute on chronic respiratory failure due to postoperative sedation  -Possible obstructive sleep apnea due to increased neck size with a Mallampati class IV   -Chronic history of tobacco smoking  -COPD  -Left lower lobe atelectasis versus pneumonia  -Resolved septic shock  -Postoperative ileus  -S/p exploratory laparotomy with left salpingo-oophorectomy and small bowel exploration  Plan   -Incentive spirometry  -CPAP with a pressure of 12 at night  -DuoNebs every 4 hours while patient is awake  -Avoid sedation when patient is drowsy  -Titrate oxygen to SPO2 > 90%  -MetaNeb every 4 hours  -Patient advised to follow-up with Dr. Mal Shin in 2 to 3 weeks as an outpatient for full PFTs and sleep study. \"Thank you for asking us to see this patient\"    Questions and concerns addressed.     Electronically signed by   Joana Lowry MD on 8/13/2020 at 2:17 PM

## 2020-08-13 NOTE — PROGRESS NOTES
[facility] GENERAL SURGERY  General Surgery Postoperative Progress Note    Pt Name: Cecy Yeung  Medical Record Number: [id number]  Date of Birth 1960   Today's Date: 8/13/2020  ASSESSMENT   1. POD # 5ex lap, abdominal washout , left salpingo-oophorectomy wit adnexal abscess excision performed by Dr. Jayshree Osullivan with gynecology  2. Sepsis shock  present at time of admission- resolving  3. Purulent peritonitis present at time of surgery  4. Left adnexal abscess - path :Ovarian cyst, right, resection:    Cyst formation with abscess and inflamed partly ciliated epithelium.       Ovary with hemorrhage. has a past medical history of Allergic rhinitis, Chronic back pain, Headache(784.0), and Panlobular emphysema ([address] PLAN     1. Cefipime  And Flagyl approprieate coverage for culture positive for Fuosbacertim Nucleatum   2. Replace NG if patient continues to vomit  3. NPO while awaiting return of bowel function   4. Continues to have moderate amount of drainage from midline incision, change packing daily and change dressing when saturated  5. Aggressive pulmonary toilet  SUBJECTIVE   Thaddeus Teixeira  Is still having significant amount of pain she has been moved to stepdown unit. She has passed flatus and had a small bowel movement.   Chest x-ray today with mild atelectasis versus evolving pneumonia  CURRENT MEDICATIONS   Scheduled Meds:   phosphorus replacement protocol   Other RX Placeholder    magnesium replacement protocol   Other RX Placeholder    lidocaine 1 % injection  5 mL Intradermal Once    sodium phosphate IVPB  15 mmol Intravenous Once    acetaZOLAMIDE (DIAMOX) IVPB  500 mg Intravenous Once    insulin lispro  0-6 Units Subcutaneous Q6H    ketorolac  15 mg Intravenous Q6H    famotidine (PEPCID) injection  20 mg Intravenous BID    calcium replacement protocol   Other RX Placeholder    sodium chloride flush  10 mL Intravenous 2 times per day    enoxaparin  40 mg Subcutaneous Daily    cefepime  2 g Intravenous Q12H    metroNIDAZOLE  500 mg Intravenous Q8H     Continuous Infusions:   lactated ringers 75 mL/hr at 20 0518    dextrose       PRN Meds:.albuterol sulfate HFA, morphine **OR** morphine, bisacodyl, [Held by provider] HYDROcodone-acetaminophen, fentanNYL, acetaminophen **OR** acetaminophen, potassium chloride, potassium chloride, potassium chloride **OR** potassium alternative oral replacement **OR** potassium chloride, glucose, dextrose, glucagon (rDNA), dextrose, sodium chloride flush, promethazine **OR** ondansetron  OBJECTIVE   CURRENT VITALS:  height is 5' 8\" (1.727 m) and weight is 221 lb 14.4 oz (100.7 kg). Her oral temperature is 98 °F (36.7 °C). Her blood pressure is 150/82 (abnormal) and her pulse is 103. Her respiration is 18 and oxygen saturation is 95%. Temperature Range (24h):Temp: 98 °F (36.7 °C) Temp  Av.3 °F (36.8 °C)  Min: 97.6 °F (36.4 °C)  Max: 98.6 °F (37 °C)  BP Range (52U): Systolic (92KAV), XUL:468 , Min:133 , OTERO:097     Diastolic (21LZG), CEZ:62, Min:65, Max:87    Pulse Range (24h): Pulse  Av.7  Min: 98  Max: 108  Respiration Range (24h): Resp  Av.7  Min: 16  Max: 22  Current Pulse Ox (24h):  SpO2: 95 %  Pulse Ox Range (24h):  SpO2  Av.1 %  Min: 91 %  Max: 95 %  Oxygen Amount and Delivery: O2 Flow Rate (L/min): 1.5 L/min  Incentive Spirometry Tx: Respiratory Effort: Dyspnea with Exertion Treatment Tolerance: (encouraged with cough/deep breathing) Incentive Spirometry Goal (mL): 1000 mL Incentive Spirometry Achieved (mL): 100 mL    Extubated, interactive, uncomfortable appearing  Abdomen is soft nondistended. Incision is clean, it is draining some serosanguineous fluid, dressing is saturated. .  Packing is in place. No erythema or induration   Moves all extremities, no peripheral edema     In: 1287.7 [P.O.:200;  I.V.:1087.7]  Out: 1250 [Urine:1250]     Date 20 0000 - 20 2621 [facility] [id number] 24 Hour Total   INTAKE   P.O.(mL/kg/hr) 200(0.2)   200   I. V.(mL/kg) 599. 7(6)   599. 7(6)   Shift Total(mL/kg) 799.7(7.9)   799.7(7.9)   OUTPUT   Urine(mL/kg/hr) 225(0.3)   225   Shift Total(mL/kg) 225(2.2)   225(2.2)   Weight (kg) 100.7 100.7 100.7 100.7     LABS     Recent Labs     08/11/20  0437 08/12/20  0610 08/13/20  0514   WBC 8.6 14.1* 15.1*   HGB 9.5* 10.9* 10.2*   HCT 32.0* 36.3* 34.7*    309 265    144 144   K 4.6 4.0 4.1    104 102   CO2 29 35* 37*   BUN 28* 32* 40*   CREATININE 0.5 0.4 0.5   MG 2.2  --   --    PHOS 2.0* 2.0* 2.1*   CALCIUM 7.5* 8.0* 7.8*      No results for input(s): PTT, INR in the last 72 hours. Invalid input(s): PT  No results for input(s): AST, ALT, BILITOT, BILIDIR, AMYLASE, LIPASE, LDH, LACTA in the last 72 hours. No results for input(s): TROPONINT in the last 72 hours. RADIOLOGY     XR CHEST PORTABLE   Final Result   Minimal opacity in the left lung base possible atelectasis, minimal infiltrate or effusion. **This report has been created using voice recognition software. It may contain minor errors which are inherent in voice recognition technology. **      Final report electronically signed by Dr. Eloisa Hannah on 8/13/2020 2:13 PM      XR ABDOMEN (KUB) (SINGLE AP VIEW)   Final Result   Increased gaseous distention throughout the small bowel loops. This is likely related to postsurgical ileus although a bowel obstruction can't be excluded. **This report has been created using voice recognition software. It may contain minor errors which are inherent in voice recognition technology. **      Final report electronically signed by Dr Edd Esteves on 8/12/2020 9:16 AM      XR CHEST PORTABLE   Final Result      Improved lateral left basilar infiltrate/atelectasis/small left pleural effusion. Mild atelectasis at the medial right lung base. Possible left hilar adenopathy which appears to be mildly improved.       **This report has been

## 2020-08-13 NOTE — PROGRESS NOTES
Report given to [facility] on 5K. Meds sent via tube system. Patient updated regarding transfer.

## 2020-08-13 NOTE — PROGRESS NOTES
[facility]  INPATIENT PHYSICAL THERAPY  EVALUATION  [facility] CCU-STEPDOWN 3B - 3B-37/037-A    Time In: 7519  Time Out: 1105  Timed Code Treatment Minutes: 8 Minutes  Minutes: 20          Date: 2020  Patient Name: Braxton Eden,  Gender:  female        MRN: [id number]  : 1960  (62 y.o.)      Referring Practitioner: Bear Cherry CNP  Diagnosis: ovarian mass  Additional Pertinent Hx: Per ER note on 2020:59 y.o. postmenopausal female with past medical history of COPD and chronic back pain who is being admitted for sepsis and pelvic mass. Ms. Gabe Rene noticed a mild amount of painless vaginal bleeding that began on Tuesday of last week 20. This continued daily and worsened until the patient passed tissue and clots this  while using the restroom. The patient noted \"something was protruding from my vagina, so I pushed it back in\". The bleeding maintained and on 2020 the patient began having fevers at home. s/pLAPAROTOMY EXPLORATORY, LEFT SALPINGO OOPHORECTOMY, SMALL BOWEL EXPLORATION, irrigation of purulent material on 2020.  Intubated  to 2020     Restrictions/Precautions:  Restrictions/Precautions: General Precautions, Fall Risk  Position Activity Restriction  Other position/activity restrictions: recent abdominal sx, son reports chronic back issues that Pt often braces self with transitions in mobility    Subjective:  Chart Reviewed: Yes  Patient assessed for rehabilitation services?: Yes  Subjective: pt up in chair upon arrival, pt ready to get back to bed, son present and supportive    General:            Hearing: Within functional limits         Pain: /10: abdomen    Social/Functional History:    Lives With: Alone  Type of Home: House  Home Layout: Two level, Bed/Bath upstairs  Home Access: Stairs to enter with rails(3)  Entrance Stairs - Number of Steps: 4     Bathroom Shower/Tub: Tub/Shower unit  Bathroom Toilet: Standard  Bathroom Equipment: tok tok tok Group bars in shower       ADL Assistance: [address]: Independent  Ambulation Assistance: Independent  Transfer Assistance: Independent          Additional Comments: Pt reports fully indep PLOF. OBJECTIVE:  Range of Motion:  Bilateral Lower Extremity: WFL    Strength:  Bilateral Lower Extremity: WFL, generalized weakness, per pt legs feel like they are locking up in WB    Balance:  Static Sitting Balance:  Stand By Assistance  Static Standing Balance: Contact Guard Assistance, X 1, fwd flexed posture, stood around 3 min with CGA with walker then fatigued     Bed Mobility:  Rolling to Right: minAx1  Sit to Supine: Moderate Assistance, X 1, assist at BLE   Scooting: Contact Guard Assistance    Transfers:  Sit to Stand: Minimal Assistance, X 1  Stand to [facility] [address], X 1    Ambulation:  Minimal Assistance, X 1  Distance: 4'x1  Surface: Level Tile  Device:Rolling Walker  Gait Deviations: Forward Flexed Posture, Slow Nicole, Decreased Step Length Bilaterally and Mild Path Deviations        Functional Outcome Measures: Completed  -PAC Inpatient Mobility Raw Score : 15  AM-PAC Inpatient T-Scale Score : 39.45    ASSESSMENT:  Activity Tolerance:  Patient tolerance of  treatment: fair. Treatment Initiated: Treatment and education initiated within context of evaluation. Evaluation time included review of current medical information, gathering information related to past medical, social and functional history, completion of standardized testing, formal and informal observation of tasks, assessment of data and development of plan of care and goals. Treatment time included skilled education and facilitation of tasks to increase safety and independence with functional mobility for improved independence and quality of life. Assessment:   Body structures, Functions, Activity limitations: Decreased functional mobility , Decreased balance, Decreased endurance, Decreased strength, Increased pain  Assessment: pt with inc abdominal pain, IV Lines, heart monitor, generalized weakness, dec balance, inc assist with walker for safe mobility, recommend cont PT to inc pt I with functional mobility  Prognosis: Excellent    REQUIRES PT FOLLOW UP: Yes    Discharge Recommendations:  Discharge Recommendations: Continue to assess pending progress, Patient would benefit from continued therapy after discharge(pt may benefit from an inpt therapy stay prior to return home)    Patient Education:  PT Education: Goals, PT Role, Plan of Care, Functional Mobility Training    Equipment Recommendations:  Equipment Needed: No    Plan:  Times per week: 5X GM  Times per day: Daily  Specific instructions for Next Treatment: therex and mobility    Goals:  Patient goals : less pain  Short term goals  Time Frame for Short term goals: by discharge  Short term goal 1: bed mobility with SBA to get in/out of bed  Short term goal 2: transfer with SBA to get in/out of chairs  Short term goal 3: amb >50'x1 with RW and SBA to walker safely in room  Long term goals  Time Frame for Long term goals : no LTGs set secondary to short ELOS    Following session, patient left in safe position with all fall risk precautions in place.

## 2020-08-13 NOTE — PROGRESS NOTES
Dr. Sussy Scott at the bedside doing dressing change. mesalt utilized in the open areas - approx. 2 inches to each site. Covered with gauze and ABDs, secured with tape. Patient tolerated well.

## 2020-08-13 NOTE — PROGRESS NOTES
Per the patient's request called her son  Ronald Arzate and updated him on the transfer to [place] [address] room 28. All questions and concerns addressed.

## 2020-08-13 NOTE — CONSULTS
Call for intraoperative consult on a 47-year-old female who was undergoing surgery for expected tubo-ovarian abscess. Was difficult to appreciate whether is coming from ovarian versus colon.   A operative dictation can be seen for further details    Electronically signed by Janice Demarco MD on 8/13/2020 at 1:51 PM

## 2020-08-13 NOTE — PROGRESS NOTES
Order received for CVC removal. Patient placed in bed. Transparent dressing removed. Skin accessed appears pink,warm and dry. No s/s of infection noted. Two Sutures removed, CVC removed with green tip intact, pressure held with sterile gauze for five minutes. Insertion area cleaned with chloro prep, bio patch applied, sterile gauze placed over bio patch,  New transparent dressing applied. Educated the patient on remaining in bed for one hour, dressing stays on for 24 hours, signs and symptoms of infection and notify primary nurse if any blood or oozing. Primary RN notified.

## 2020-08-13 NOTE — PROGRESS NOTES
[address] [place] [address]  Occupational Therapy  Daily Note  Time:    Time In: 5954  Time Out: 0748  Timed Code Treatment Minutes: 34 Minutes  Minutes: 34          Date: 2020  Patient Name: Cecy Yeung,   Gender: female      Room: -37/037-A  MRN: [id number]  : 1960  (62 y.o.)  Referring Practitioner: LAURE WRIGHT CNP  Diagnosis: ovarian mass  Additional Pertinent Hx: Per ER note on 2020:59 y.o. postmenopausal female with past medical history of COPD and chronic back pain who is being admitted for sepsis and pelvic mass. Ms. Neal Haider noticed a mild amount of painless vaginal bleeding that began on Tuesday of last week 20. This continued daily and worsened until the patient passed tissue and clots this  while using the restroom. The patient noted \"something was protruding from my vagina, so I pushed it back in\". The bleeding maintained and on 2020 the patient began having fevers at home. s/pLAPAROTOMY EXPLORATORY, LEFT SALPINGO OOPHORECTOMY, SMALL BOWEL EXPLORATION, irrigation of purulent material on 2020. Intubated  to 2020    Restrictions/Precautions:  Restrictions/Precautions: Fall Risk  Position Activity Restriction  Other position/activity restrictions: recent abdominal sx, son reports chronic back issues that Pt often braces self with transitions in mobility      SUBJECTIVE: required encouragement, c/o dizziness during session /79 while seated EOB    PAIN: no number given/10: pain in abdomen    COGNITION: Slow Processing    ADL:   Lower Extremity Dressing: Dependent. for adjusting slipper socks. BALANCE:  Sitting Balance:  Stand By Assistance. Standing Balance: Contact Guard Assistance, X 2. BUE support on therapist & tech's UE    BED MOBILITY:  Supine to Sit: Minimal Assistance HOB elevated    TRANSFERS:  Sit to Stand:  Minimal Assistance, X 2.  BLE blocked; attempted x 1 with mod A x 1-Pt felt LE would not hold her    FUNCTIONAL MOBILITY:  Assistive Device: None  Assist Level:  Minimal Assistance and X 2. Distance: 2 steps to recliner from EOB        ADDITIONAL ACTIVITIES:  Completed BUE AROM for shoulder flexion x 8 reps. Able to raise to 85 degrees today. ASSESSMENT:     Activity Tolerance:  Patient tolerance of  treatment: fair. Discharge Recommendations: Continue to assess pending progress  Not safe to go home at this time. Equipment Recommendations: Other: Monitor pending progress  Plan: Times per week: 5x  Current Treatment Recommendations: Functional Mobility Training, Balance Training, Endurance Training, Safety Education & Training, Self-Care / ADL    Patient Education  Patient Education: t/f techniques    Goals  Short term goals  Time Frame for Short term goals: until discharge  Short term goal 1: Complete various sit-stand t/fs including toilet with CGA & 0 vcs for safety  Short term goal 2: Complete mobility to/from bathroom with RW, CGA, & 0-2 vcs for walker safety  Short term goal 3: Tolerate 3-4 min standing ADL task with CGA for increased ease of sinkside grooming tasks  Short term goal 4: Complete LE dressing with min A & LH AE prn  Long term goals  Time Frame for Long term goals : No LTG set d/t short ELOS    Following session, patient left in safe position with all fall risk precautions in place.

## 2020-08-13 NOTE — CARE COORDINATION
8/13/20, 11:05 AM EDT    DISCHARGE ON GOING 1016 Nudge day: 6  Location: [address] [place] [address] Reason for admit: Ovarian mass [N83.8]  Ovarian mass [N83.8]   Procedure: 8/7 CT Abd/pelvis: Lingular pneumonia; cholelithiasis; Large pelvic mass which is mildly inflamed likely relating to the left ovary. Benign and malignant etiologies are possible  8/7 US Abd/pelvis: Large complex lesion in the left ovary could relate to abscess or hemorrhagic neoplasm either benign or malignant. Lack of significant ascites mitigates against malignancy  8/7 Transvaginal US: Large pelvic mass which is mildly inflamed likely relating to the left ovary. Benign and malignant etiologies are possible  8/8 LAPAROTOMY EXPLORATORY, LEFT SALPINGO OOPHORECTOMY, SMALL BOWEL EXPLORATION, irrigation of purulent material  8/8 Intubated  8/8 CVC RIJ  8/10 KUB: Non obstructive bowel gas pattern; no evidence of ileus  8/11 Extubated  8/12 KUB: Increased gaseous distention throughout the small bowel loops. This is likely related to postsurgical ileus although a bowel obstruction can't be excluded. Treatment Plan of Care: Transferred from ICU to . Hospitalist, OB/GYN and General Surgery following. POD #5. Afebrile. O2 at 1.5L, sats 91-94%. Pt is NPO. Pt to have PICC line placed today. WBC 15.1, Hgb 10.2. BUN 40. Pt is still complaining of nausea, even with just ice chips. Cefepime iv q12hr, Lovenox daily, Pepcid iv bid, Toradol iv q6hr, IVF, Flagyl iv q8hr. Pain control, electrolyte replacements. Decadron iv x1 today. Given Lasix iv 20mg x1 yesterday. Discussed with Dr. Sadi Pascal, feels pt may need TPN. Wound care. Barriers to Discharge: Not medically ready. PCP: Odalys Simmons MD  Readmission Risk Score: 15%  Patient Goals/Plan/Treatment Preferences: Home alone with [facility] vs home with son in [place]. SW on case. LTACH eval today. Ashley with [facility] made aware. Pt to be [facility] to [facility].  Handoff report called to [place], 5K CM.      Electronically signed by Ruby Johnson RN on 8/13/2020 at 11:41 AM

## 2020-08-13 NOTE — PROGRESS NOTES
Pt admitted to  5K28 by cart/stretcher from 3A/B. IV lactated Ringer's infusing into the internal jugular right, condition patent and no redness at a rate of 75 mls/ hour with about 200 mls in the bag still. IV site free of s/s of infection or infiltration. Vital signs obtained. Assessment complete. Oriented to room. All questions answered with no further questions at this time. Two nurse skin assessment performed by Brenda AGUAYO and MARLENE Angel RN. Oriented to room. Policies and procedures for  explained. A Fall prevention and safety brochure discussed with patient. Bed alarm on. Call light in reach.

## 2020-08-13 NOTE — PROGRESS NOTES
Assessment and Plan:        1. Ovarian abscess- s/p OR; just out of ICU  2. Septic shock- resolved  3. Pos fluid balance- slow IV fluids, diuretic  4. Ileus- pt is npo\ ? Relistor. Should we start tpn? 5. elev CO2; check ABG for CO2 retention  6. Anticipate long stay- ? [place]        CC:  Abdominal pain, fever  HPI: pt in prior good health, presented with above. Ct showed ovarian mass. Went to OR  ROS (12 point review of systems completed. Pertinent positives noted. Otherwise ROS is negative) : neg  PMH:  Per HPI. copd  SHX:  Single, former smoker  FHX: diabetes  Allergies: See above    Medications:     lactated ringers 75 mL/hr at 08/13/20 0518    dextrose        phosphorus replacement protocol   Other RX Placeholder    magnesium replacement protocol   Other RX Placeholder    insulin lispro  0-6 Units Subcutaneous Q6H    ketorolac  15 mg Intravenous Q6H    dexamethasone  4 mg Intravenous Daily    famotidine (PEPCID) injection  20 mg Intravenous BID    calcium replacement protocol   Other RX Placeholder    sodium chloride flush  10 mL Intravenous 2 times per day    enoxaparin  40 mg Subcutaneous Daily    cefepime  2 g Intravenous Q12H    metroNIDAZOLE  500 mg Intravenous Q8H       Vital Signs:   /65   Pulse 103   Temp 98.6 °F (37 °C) (Oral)   Resp 22   Ht 5' 8\" (1.727 m)   Wt 221 lb 5.5 oz (100.4 kg)   SpO2 91%   BMI 33.65 kg/m²      Intake/Output Summary (Last 24 hours) at 8/13/2020 2197  Last data filed at 8/13/2020 0518  Gross per 24 hour   Intake 1412.68 ml   Output 1550 ml   Net -137.32 ml        Physical Examination: General appearance - chronically ill appearing  Mental status - alert, oriented to person, place, and time  Neck - supple, no significant adenopathy, no JVD, or carotid bruits  Chest - Breath sounds diminished bilaterally.     Heart - no JVD, tachycardic  Abdomen - distended, very rare faint bowel sounds  Neurological - alert, oriented, normal speech, no focal findings or movement disorder noted  Musculoskeletal - no muscular tenderness noted  Extremities - no pedal edema noted  Skin - normal coloration and turgor, no rashes, no suspicious skin lesions noted    Data: (All radiographs, tracings, PFTs, and imaging are personally viewed and interpreted unless otherwise noted).     Labs pending      Electronically signed by Alberta Marcial MD on 8/13/2020 at 6:52 AM

## 2020-08-13 NOTE — PROGRESS NOTES
muscular tenderness noted  Extremities - no pedal edema noted  Skin - normal coloration and turgor, no rashes, no suspicious skin lesions noted    Data: (All radiographs, tracings, PFTs, and imaging are personally viewed and interpreted unless otherwise noted).     Labs pending      Electronically signed by Tam Altamirano MD on 8/13/2020 at 6:47 AM

## 2020-08-13 NOTE — PROGRESS NOTES
PICC Procedure Note    Shannon Bass   Admitted- 8/7/2020 10:16 AM  Admission diagnosis- Ovarian mass [N83.8]  Ovarian mass [N83.8]      Attending Physician- KYLE Gaxiola - CNP  Ordering Physician-Lenny Weber MD  Indication for Insertion: Poor Vascular Access    Catheter Insertion Date- 8/13/2020   Lot Number- [id number]  Gauge-5  Lumen-dual    Insertion Site- NAYE Basilic  Vein Diameter- 3 mm  Catheter Length- 42 cm  Internal Length- 42 cm  Exposed Catheter Length- 0cm   Upper Arm Circumference- 33cm  Tip Confirmation System Bundle met- No: chest xray  If X-ray required, Tip Location- SVC  Radiologist- Dr Mindy Hernadez    PICC insertion successful- Yes  Ultrasound- yes    Okay To Use PICC- Yes    Electronically signed by Lidia Miller RN on 8/13/2020 at 3:21 PM

## 2020-08-14 LAB
ALLEN TEST: POSITIVE
ANION GAP SERPL CALCULATED.3IONS-SCNC: 6 MEQ/L (ref 8–16)
BASE EXCESS (CALCULATED): 8.5 MMOL/L (ref -2.5–2.5)
BASOPHILS # BLD: 0.3 %
BASOPHILS ABSOLUTE: 0.1 THOU/MM3 (ref 0–0.1)
BUN BLDV-MCNC: 38 MG/DL (ref 7–22)
CALCIUM SERPL-MCNC: 7.6 MG/DL (ref 8.5–10.5)
CHLORIDE BLD-SCNC: 101 MEQ/L (ref 98–111)
CO2: 36 MEQ/L (ref 23–33)
COLLECTED BY:: ABNORMAL
CREAT SERPL-MCNC: 0.5 MG/DL (ref 0.4–1.2)
DEVICE: ABNORMAL
EOSINOPHIL # BLD: 0.1 %
EOSINOPHILS ABSOLUTE: 0 THOU/MM3 (ref 0–0.4)
ERYTHROCYTE [DISTWIDTH] IN BLOOD BY AUTOMATED COUNT: 13.2 % (ref 11.5–14.5)
ERYTHROCYTE [DISTWIDTH] IN BLOOD BY AUTOMATED COUNT: 47.7 FL (ref 35–45)
GFR SERPL CREATININE-BSD FRML MDRD: > 90 ML/MIN/1.73M2
GLUCOSE BLD-MCNC: 117 MG/DL (ref 70–108)
GLUCOSE BLD-MCNC: 118 MG/DL (ref 70–108)
GLUCOSE BLD-MCNC: 125 MG/DL (ref 70–108)
GLUCOSE BLD-MCNC: 146 MG/DL (ref 70–108)
GLUCOSE BLD-MCNC: 157 MG/DL (ref 70–108)
HCO3: 35 MMOL/L (ref 23–28)
HCT VFR BLD CALC: 33.3 % (ref 37–47)
HEMOGLOBIN: 10 GM/DL (ref 12–16)
IFIO2: 1
IMMATURE GRANS (ABS): 1.15 THOU/MM3 (ref 0–0.07)
IMMATURE GRANULOCYTES: 6.6 %
LYMPHOCYTES # BLD: 8.1 %
LYMPHOCYTES ABSOLUTE: 1.4 THOU/MM3 (ref 1–4.8)
MAGNESIUM: 2.2 MG/DL (ref 1.6–2.4)
MCH RBC QN AUTO: 29.9 PG (ref 26–33)
MCHC RBC AUTO-ENTMCNC: 30 GM/DL (ref 32.2–35.5)
MCV RBC AUTO: 99.7 FL (ref 81–99)
MONOCYTES # BLD: 4.6 %
MONOCYTES ABSOLUTE: 0.8 THOU/MM3 (ref 0.4–1.3)
NUCLEATED RED BLOOD CELLS: 0 /100 WBC
O2 SATURATION: 92 %
PCO2: 60 MMHG (ref 35–45)
PH BLOOD GAS: 7.38 (ref 7.35–7.45)
PHOSPHORUS: 2.7 MG/DL (ref 2.4–4.7)
PLATELET # BLD: 335 THOU/MM3 (ref 130–400)
PMV BLD AUTO: 10.2 FL (ref 9.4–12.4)
PO2: 68 MMHG (ref 71–104)
POTASSIUM SERPL-SCNC: 3.9 MEQ/L (ref 3.5–5.2)
RBC # BLD: 3.34 MILL/MM3 (ref 4.2–5.4)
SEG NEUTROPHILS: 80.3 %
SEGMENTED NEUTROPHILS ABSOLUTE COUNT: 14.1 THOU/MM3 (ref 1.8–7.7)
SODIUM BLD-SCNC: 143 MEQ/L (ref 135–145)
SOURCE, BLOOD GAS: ABNORMAL
WBC # BLD: 17.5 THOU/MM3 (ref 4.8–10.8)

## 2020-08-14 PROCEDURE — 6360000002 HC RX W HCPCS: Performed by: OBSTETRICS & GYNECOLOGY

## 2020-08-14 PROCEDURE — 94640 AIRWAY INHALATION TREATMENT: CPT

## 2020-08-14 PROCEDURE — 6360000002 HC RX W HCPCS: Performed by: INTERNAL MEDICINE

## 2020-08-14 PROCEDURE — 2700000000 HC OXYGEN THERAPY PER DAY

## 2020-08-14 PROCEDURE — 80048 BASIC METABOLIC PNL TOTAL CA: CPT

## 2020-08-14 PROCEDURE — 82803 BLOOD GASES ANY COMBINATION: CPT

## 2020-08-14 PROCEDURE — 2140000000 HC CCU INTERMEDIATE R&B

## 2020-08-14 PROCEDURE — 94760 N-INVAS EAR/PLS OXIMETRY 1: CPT

## 2020-08-14 PROCEDURE — 2500000003 HC RX 250 WO HCPCS: Performed by: OBSTETRICS & GYNECOLOGY

## 2020-08-14 PROCEDURE — 82948 REAGENT STRIP/BLOOD GLUCOSE: CPT

## 2020-08-14 PROCEDURE — 36415 COLL VENOUS BLD VENIPUNCTURE: CPT

## 2020-08-14 PROCEDURE — 83735 ASSAY OF MAGNESIUM: CPT

## 2020-08-14 PROCEDURE — 85025 COMPLETE CBC W/AUTO DIFF WBC: CPT

## 2020-08-14 PROCEDURE — 2500000003 HC RX 250 WO HCPCS: Performed by: NURSE PRACTITIONER

## 2020-08-14 PROCEDURE — 99233 SBSQ HOSP IP/OBS HIGH 50: CPT | Performed by: INTERNAL MEDICINE

## 2020-08-14 PROCEDURE — 94669 MECHANICAL CHEST WALL OSCILL: CPT

## 2020-08-14 PROCEDURE — 2709999900 HC NON-CHARGEABLE SUPPLY

## 2020-08-14 PROCEDURE — 2500000003 HC RX 250 WO HCPCS: Performed by: INTERNAL MEDICINE

## 2020-08-14 PROCEDURE — 2580000003 HC RX 258: Performed by: OBSTETRICS & GYNECOLOGY

## 2020-08-14 PROCEDURE — 94660 CPAP INITIATION&MGMT: CPT

## 2020-08-14 PROCEDURE — 84100 ASSAY OF PHOSPHORUS: CPT

## 2020-08-14 PROCEDURE — 6370000000 HC RX 637 (ALT 250 FOR IP): Performed by: INTERNAL MEDICINE

## 2020-08-14 PROCEDURE — 36600 WITHDRAWAL OF ARTERIAL BLOOD: CPT

## 2020-08-14 PROCEDURE — 99233 SBSQ HOSP IP/OBS HIGH 50: CPT | Performed by: NURSE PRACTITIONER

## 2020-08-14 RX ORDER — FUROSEMIDE 10 MG/ML
20 INJECTION INTRAMUSCULAR; INTRAVENOUS ONCE
Status: COMPLETED | OUTPATIENT
Start: 2020-08-14 | End: 2020-08-14

## 2020-08-14 RX ADMIN — CALCIUM GLUCONATE: 98 INJECTION, SOLUTION INTRAVENOUS at 18:40

## 2020-08-14 RX ADMIN — METRONIDAZOLE 500 MG: 500 INJECTION, SOLUTION INTRAVENOUS at 01:47

## 2020-08-14 RX ADMIN — METRONIDAZOLE 500 MG: 500 INJECTION, SOLUTION INTRAVENOUS at 09:37

## 2020-08-14 RX ADMIN — KETOROLAC TROMETHAMINE 15 MG: 30 INJECTION, SOLUTION INTRAMUSCULAR at 01:04

## 2020-08-14 RX ADMIN — IPRATROPIUM BROMIDE AND ALBUTEROL SULFATE 1 AMPULE: .5; 3 SOLUTION RESPIRATORY (INHALATION) at 08:08

## 2020-08-14 RX ADMIN — IPRATROPIUM BROMIDE AND ALBUTEROL SULFATE 1 AMPULE: .5; 3 SOLUTION RESPIRATORY (INHALATION) at 16:54

## 2020-08-14 RX ADMIN — FAMOTIDINE 20 MG: 10 INJECTION, SOLUTION INTRAVENOUS at 20:30

## 2020-08-14 RX ADMIN — ONDANSETRON 4 MG: 2 INJECTION INTRAMUSCULAR; INTRAVENOUS at 01:05

## 2020-08-14 RX ADMIN — FAMOTIDINE 20 MG: 10 INJECTION, SOLUTION INTRAVENOUS at 10:34

## 2020-08-14 RX ADMIN — CEFEPIME 2 G: 2 INJECTION, POWDER, FOR SOLUTION INTRAMUSCULAR; INTRAVENOUS at 01:03

## 2020-08-14 RX ADMIN — CEFEPIME 2 G: 2 INJECTION, POWDER, FOR SOLUTION INTRAMUSCULAR; INTRAVENOUS at 13:11

## 2020-08-14 RX ADMIN — KETOROLAC TROMETHAMINE 15 MG: 30 INJECTION, SOLUTION INTRAMUSCULAR at 18:41

## 2020-08-14 RX ADMIN — KETOROLAC TROMETHAMINE 15 MG: 30 INJECTION, SOLUTION INTRAMUSCULAR at 07:14

## 2020-08-14 RX ADMIN — FUROSEMIDE 20 MG: 10 INJECTION, SOLUTION INTRAMUSCULAR; INTRAVENOUS at 12:21

## 2020-08-14 RX ADMIN — IPRATROPIUM BROMIDE AND ALBUTEROL SULFATE 1 AMPULE: .5; 3 SOLUTION RESPIRATORY (INHALATION) at 13:43

## 2020-08-14 RX ADMIN — SODIUM CHLORIDE, PRESERVATIVE FREE 10 ML: 5 INJECTION INTRAVENOUS at 20:30

## 2020-08-14 RX ADMIN — ENOXAPARIN SODIUM 40 MG: 40 INJECTION SUBCUTANEOUS at 20:30

## 2020-08-14 RX ADMIN — IPRATROPIUM BROMIDE AND ALBUTEROL SULFATE 1 AMPULE: .5; 3 SOLUTION RESPIRATORY (INHALATION) at 20:53

## 2020-08-14 RX ADMIN — METRONIDAZOLE 500 MG: 500 INJECTION, SOLUTION INTRAVENOUS at 16:39

## 2020-08-14 RX ADMIN — MORPHINE SULFATE 4 MG: 4 INJECTION, SOLUTION INTRAMUSCULAR; INTRAVENOUS at 03:05

## 2020-08-14 RX ADMIN — KETOROLAC TROMETHAMINE 15 MG: 30 INJECTION, SOLUTION INTRAMUSCULAR at 12:27

## 2020-08-14 ASSESSMENT — PAIN SCALES - GENERAL
PAINLEVEL_OUTOF10: 0
PAINLEVEL_OUTOF10: 9
PAINLEVEL_OUTOF10: 5
PAINLEVEL_OUTOF10: 0

## 2020-08-14 NOTE — PLAN OF CARE
Problem: Impaired respiratory status  Goal: Normal spontaneous ventilation  Outcome: Ongoing  Note: CPAP 12 started to improve arterial blood gas results. Problem: Impaired respiratory status  Goal: Clear lung sounds  Outcome: Ongoing  Note: Metaneb with Duoneb to improve breath sounds and cough effectiveness. Problem: Impaired respiratory status  Goal: Absence of new skin breakdown  Outcome: Ongoing  Note: Skin prep applied to prevent skin breakdown.

## 2020-08-14 NOTE — CONSULTS
Comprehensive Nutrition Assessment    Type and Reason for Visit:  Reassess, Consult(New TPN; PO Monitor)    Nutrition Recommendations/Plan:   *Recommend starting 3-in-1 TPN of 10 kcal/kg, 1.2 g/kg protein and 20% lipid kcals based on dosing weight of 73 kg. *Diet per MD as GI status allows. *Will trial Ensure Clear TID. Nutrition Assessment: Pt. with no improvement from a nutritional standpoint AEB pt with poor oral intake of Clear Liquid diet, post-op ileus with need for bowel rest & need for 3-in-1 TPN. Remains at risk for further nutritional compromise r/t altered GI function, admit with ovarian abscess, intubation this admit & extubated 8/11 and underlying medical condition (hx impaired glucose, chronic back pain).  Nutrition recommendations/interventions as per above    Malnutrition Assessment:  Malnutrition Status: At risk for malnutrition (Comment)    Context:  Acute Illness     Findings of the 6 clinical characteristics of malnutrition:  Energy Intake:  7 - 50% or less of estimated energy requirements for 5 or more days  Weight Loss:  No significant weight loss     Body Fat Loss:  No significant body fat loss     Muscle Mass Loss:  No significant muscle mass loss    Fluid Accumulation:  Unable to assess     Strength:  Not Performed    Estimated Daily Nutrient Needs:  Energy (kcal):  5113-5832 kcal/day (20-24 kcal/kg); Weight Used for Energy Requirements:  (73 kg ABW)     Protein (g):  128+ grams/day (2+ grams/kgm IBW); Weight Used for Protein Requirements:  Ideal        Fluid (ml/day):  per MD    Nutrition Related Findings:  pt with improving post-op ileus; pt seen- sleeping during visit on oxygen therapy; pt on lear Liquid diet; per RN, no c/i nausea/emesis today; +gas; +PICC line in place; plan start 3-in-1 TPN tonight; Labs: K+ 3.9, Phos 2.7, Mg 2.2, BUN 38, Glucose 125, POC Glucose 46, Hg 10, Na 143.  Rx includes: Flagyl, Dulcolax, Zofran PRN      Wounds:  (abdominal incision- OR 8/8 - exp lap, oorphorectomy, SB exploration)       Current Nutrition Therapies:    DIET CLEAR LIQUID;   · Parenteral Nutrition Orders:  · Type and Formula: 3-in-1 Custom(Dosing weight 73 kg & start with 10 kcals/kg, 1.2 grams protein/kg & 20% lipid kcals)   · Lipids: Daily  · Rate/Volume: per pharmacy  · Duration: Continuous  · Current PN Order Provides: 730 kcal, 88 grams protein, 146 lipid kcals and 68 grams dextrose    Anthropometric Measures:  · Height: 5' 8\" (172.7 cm)  · Current Body Weight: 221 lb 14.4 oz (100.7 kg)(8/13; +2 pitting edema BLE & BUE)   · Admission Body Weight: 200 lb 12.8 oz (91.1 kg)(8/7, standing scale, no edema)    · Usual Body Weight: (per EMR: 11/7/19: 209# 12.8 oz, 7/9/20: 205# 14.4 oz)     · Ideal Body Weight: 140 lbs  · BMI: 33.7  · BMI Categories: Obese Class 1 (BMI 30.0-34. 9)       Nutrition Diagnosis:   · Inadequate oral intake related to (post-op ileus) as evidenced by other (comment), nutrition support - parenteral nutrition, intake 0-25%(pt only on a Clear Liquid diet)    Nutrition Interventions:   Food and/or Nutrient Delivery:  Continue Current Diet, Start Parenteral Nutrititon  Nutrition Education/Counseling:  (Education not appropriate at this time.)   Coordination of Nutrition Care:  Continued Inpatient Monitoring    Goals:  Pt will tolerate adequate nutrition support to meet 75% or more of estimated nutritional needs during LOS until able to transition to solely po feed       Nutrition Monitoring and Evaluation:   Food/Nutrient Intake Outcomes:  Diet Advancement/Tolerance, Food and Nutrient Intake, Parenteral Nutrition Intake/Tolerance  Physical Signs/Symptoms Outcomes:  Biochemical Data, Weight, Skin, Nutrition Focused Physical Findings, Hemodynamic Status, GI Status, Nausea or Vomiting, Fluid Status or Edema     Discharge Planning:     Too soon to determine     Electronically signed by Isidro Taylor RD, LD on 8/14/20 at 10:51 AM EDT    Contact: [phone]

## 2020-08-14 NOTE — PROGRESS NOTES
Intensive Care Unit  Ob/Gyn  Attending Progress Note          SUBJECTIVE:  Events of the last 24 hours. Passing gas this am. No vomiting. Still with some pain.    Tolerating ice chips     OBJECTIVE:    Current Medications:  Current Facility-Administered Medications: phosphorus replacement protocol, , Other, RX Placeholder  magnesium replacement protocol, , Other, RX Placeholder  lidocaine PF 1 % injection 5 mL, 5 mL, Intradermal, Once  ipratropium-albuterol (DUONEB) nebulizer solution 1 ampule, 1 ampule, Inhalation, Q4H WA  albuterol sulfate  (90 Base) MCG/ACT inhaler 2 puff, 2 puff, Inhalation, Q6H PRN  morphine (PF) injection 2 mg, 2 mg, Intravenous, Q2H PRN **OR** morphine injection 4 mg, 4 mg, Intravenous, Q2H PRN  insulin lispro (HUMALOG) injection vial 0-6 Units, 0-6 Units, Subcutaneous, Q6H  ketorolac (TORADOL) injection 15 mg, 15 mg, Intravenous, Q6H  bisacodyl (DULCOLAX) suppository 10 mg, 10 mg, Rectal, Daily PRN  [Held by provider] HYDROcodone-acetaminophen (NORCO) 7.5-325 MG per tablet 1 tablet, 1 tablet, Oral, Q6H PRN  fentaNYL (SUBLIMAZE) injection 25 mcg, 25 mcg, Intravenous, Q4H PRN  acetaminophen (TYLENOL) tablet 650 mg, 650 mg, Oral, Q4H PRN **OR** acetaminophen (TYLENOL) suppository 650 mg, 650 mg, Rectal, Q4H PRN  lactated ringers infusion, , Intravenous, Continuous  famotidine (PEPCID) injection 20 mg, 20 mg, Intravenous, BID  calcium replacement protocol, , Other, RX Placeholder  potassium chloride 10 mEq/100 mL IVPB (Peripheral Line), 10 mEq, Intravenous, PRN  potassium chloride 20 mEq/50 mL IVPB (Central Line), 20 mEq, Intravenous, PRN  potassium chloride (KLOR-CON M) extended release tablet 40 mEq, 40 mEq, Oral, PRN **OR** potassium bicarb-citric acid (EFFER-K) effervescent tablet 40 mEq, 40 mEq, Oral, PRN **OR** potassium chloride 10 mEq/100 mL IVPB (Peripheral Line), 10 mEq, Intravenous, PRN  glucose (GLUTOSE) 40 % oral gel 15 g, 15 g, Oral, PRN  dextrose 50 % IV solution, 12.5 g, Intravenous, PRN  glucagon (rDNA) injection 1 mg, 1 mg, Intramuscular, PRN  dextrose 5 % solution, 100 mL/hr, Intravenous, PRN  sodium chloride flush 0.9 % injection 10 mL, 10 mL, Intravenous, 2 times per day  sodium chloride flush 0.9 % injection 10 mL, 10 mL, Intravenous, PRN  promethazine (PHENERGAN) tablet 12.5 mg, 12.5 mg, Oral, Q6H PRN **OR** ondansetron (ZOFRAN) injection 4 mg, 4 mg, Intravenous, Q6H PRN  enoxaparin (LOVENOX) injection 40 mg, 40 mg, Subcutaneous, Daily  cefepime (MAXIPIME) 2 g IVPB minibag, 2 g, Intravenous, Q12H  metronidazole (FLAGYL) 500 mg in NaCl 100 mL IVPB premix, 500 mg, Intravenous, Q8H    Physical:   VITALS:  BP (!) 158/77   Pulse 95   Temp 98.4 °F (36.9 °C) (Oral)   Resp 18   Ht 5' 8\" (1.727 m)   Wt 221 lb 14.4 oz (100.7 kg)   SpO2 93%   BMI 33.74 kg/m²   CURRENT TEMPERATURE:  Temp: 98.4 °F (36.9 °C)  TEMPERATURE RANGE OVER 24HRS:   Temp  Av.3 °F (36.8 °C)  Min: 98 °F (36.7 °C)  Max: 98.6 °F (37 °C)  24HR RESPIRATORY RATE RANGE:  Resp  Av.4  Min: 16  Max: 20  24HR PULSE RANGE: Pulse  Av.6  Min: 94  Max: 104  24HR BLOOD PRESSURE RANGE:  Systolic (07AKP), HFT:784 , Min:143 , MPQ:413   ; Diastolic (58UXS), QYK:73, Min:67, Max:83    24HR INTAKE/OUTPUT:      Intake/Output Summary (Last 24 hours) at 2020 9563  Last data filed at 2020 0255  Gross per 24 hour   Intake 2216 ml   Output 1550 ml   Net 666 ml           LUNGS: Clear  To auscultation bilat. and with good air exchange  CARDIOVASCULAR:  Normal apical impulse, regular rate and rhythm  ABDOMEN:  Softly distended. Hypoactive BS. Incision c/d/i sm amount of serous drainage.      MUSCULOSKELETAL:  there is no redness, warmth, or swelling of the joints    Data:  CBC:   Lab Results   Component Value Date    WBC 17.5 2020    RBC 3.34 2020    HGB 10.0 2020    HCT 33.3 2020    MCV 99.7 2020    RDW 13.2 2018     2020     BMP:    Lab Results   Component Value Date  08/14/2020    K 3.9 08/14/2020    K 3.5 08/08/2020     08/14/2020    CO2 36 08/14/2020    BUN 38 08/14/2020    CREATININE 0.5 08/14/2020    CALCIUM 7.6 08/14/2020    LABGLOM >90 08/14/2020    GLUCOSE 125 08/14/2020     Urine Culture:  neg  Blood Culture: neg  Peritoneal culture fusobacterium  Sputum Culture:  No components found for: CSPUTUM  CVP Mean:      ICU guidelines/prophylaxis active for the following:    insulin guidelines, DVT prophylaxis and ulcer prophylaxis    ASSESSMENT & PLAN:    Active Problems:  Day 6 S/P exp lap for TOA. ID -Appears to be improving  Continue metronidazole and Maxipime. WBC 17, afebrile  WBC probably elevated secondary in part to steroids    Bowel function - expected ileus some BS today. Tolerated clears today with some nausea and no emesis. Nutrition -Would start TPN as she is not ready for advancing diet yet. Oxygen - still with O2 requirement    When Bowel fxn returns we can transition to PO antibiotics flagyl and doxy. Discharge will depend on functional status once bowel function returns. . Home health vs home with son. Doubt long term need for [place] unless functional status does not improve over the next few days. Adequate Urine output.     Increase activity./ Physical therapy  Bhat out if able to get to toilet    Dr Natalia Ferraro covering this weekend    Shelva Bearded 8/14/2020 7:53 AM

## 2020-08-14 NOTE — PROGRESS NOTES
The patient did fair overnight. Required 1 doses of pain meds and is currently comfortable and scheduled toradol. N/V? Yes -- Meds used? yes - Zofran x2   Bowel sounds are active. Patient ambulated 0  times this shift with turned every 2 hours and tolerated fair. Diet is currently clear liquidand being tolerated fair.

## 2020-08-14 NOTE — PLAN OF CARE
Problem: Pain:  Goal: Pain level will decrease  Description: Pain level will decrease  Outcome: Ongoing  Note: Pain assessment complete. PRN pain medications given per physician orders. Non pharmacological pain intervention given rest and reposition. Will continue to monitor       Problem: Nutrition  Goal: Optimal nutrition therapy  Outcome: Ongoing  Note: Tolerated clear liquids fairly well throughout the night. Antiemetic medications given PRN per patient request.     Problem: Discharge Planning:  Goal: Participates in care planning  Description: Participates in care planning  Outcome: Ongoing  Note: Ongoing discharge planning. Problem: Airway Clearance - Ineffective:  Goal: Ability to maintain a clear airway will improve  Description: Ability to maintain a clear airway will improve  Outcome: Ongoing  Note: Lung sounds are diminished and clear. 1.5L O2 NC. Problem: Pain:  Goal: Pain level will decrease  Description: Pain level will decrease  Outcome: Ongoing  Note: Pain assessment complete. PRN pain medications given per physician orders. Non pharmacological pain intervention given rest and reposition. Will continue to monitor       Problem: Skin Integrity - Impaired:  Goal: Will show no infection signs and symptoms  Description: Will show no infection signs and symptoms  Outcome: Ongoing  Note: No new skin issues noted this shift will continue to monitor. Patient is turned every two hours in bed with staff assistance. Patient did refuse turning at times throughout the night. Patient was educated on need for turning. Care plan reviewed with patient. Patient verbalize understanding of the plan of care and contribute to goal setting.

## 2020-08-14 NOTE — PROGRESS NOTES
Hospitalist Progress Note      Patient:  Bismark Quezadadmont    Unit/Bed:5K-28/028-A  YOB: 1960  MRN: [id number]   Acct: [de-identified]   PCP: Mamadou Mendez MD  Date of Admission: 8/7/2020    Assessment/Plan:    1. Acute hypoxic respiratory failure with hypercarbia: Patient extubated on 8/11/2020 after requiring intubation for surgical procedure. Patient continues to require oxygen to maintain adequate O2 saturations, currently on 1L via nasal cannula. CPAP ordered overnight, was not applied. Repeat ABGs this morning demonstrated a CO2 level of 60. Patient placed on CPAP with transfer orders to stepdown. Pulmonology following, appreciate assistance. 2. Combined respiratory and metabolic acidosis: ABGs this morning demonstrated a CO2 level of 60 with a bicarb level at 35.  pH at 7.38. Patient endorses fatigue and lethargy but remains alert and oriented responding to questions appropriately. Continue Duonebs q4h while awake as well as CPAP per pulmonology recommendations. 3. Severe pneumonia POA: Improving. Community-acquired. Localized to the left lower lobe. Pseudomonas aeruginosa positive on PCR. Continue cefepime (Day #8). Plan to transition to oral antibiotics once bowel function returns. 4. Large left adnexal abscess: S/p exploratory laparotomy with left salpingo oophorectomy, small bowel exploration, and irrigation of purulent material on 8/8/2024 ovarian abscess per Dr. Keegan Azar. Continue IV cefepime and Flagyl (Day #8). 5. Acute normocytic anemia: Improving. Current H&H 10/33.3 with an MCV of 99.7. Most likely secondary  6. Postoperative ileus: Improved. Patient advancing diet to liquids. 7. Septic shock: Resolved. 8. Leukocytosis: WBC count 17.5. Infection contributing, most likely steroid-induced. Recheck CBC in the morning. 9. GERD: Continue Pepcid. 10. Physical deconditioning: Secondary to above issues.   PT/OT Elvi/Johnie. Return to intensive care on mechanical ventilation. 8/12/2020: Extubated on 8/11/2020. Stable to transfer out of Intensive Care unit. Subjective (past 24 hours):   Patient resting in bed at the time of the interview. States that she feels very tired and feels pretty terrible overall. She was updated on her carbon dioxide levels and the effects that hypercarbia has on the body. Patient was informed that she will require CPAP to help clear the carbon dioxide and she verbalized her understanding of the plan of care. She had no other needs at this time. Past medical history, family history, social history and allergies reviewed again and is unchanged since admission. ROS (14 point review of systems completed. Pertinent positives noted. Otherwise ROS is negative) :  GENERAL: No fever,chills, or night sweats. Endorses fatigue, malaise. SKIN: No lesions or rashes. HEAD: No headaches or recent injury. EYES: No acute changes in vision, no diplopia or blurred vision. EARS: No hearing loss, no tinnitus. NOSE/THROAT: No rhinorrhea or pharyngitis, no nasal drainage. NECK: No lumps or unusual neck stiffness. PULMONARY: Respirations easy and non-labored, no acute distress. CARDIAC: No chest pain, pressure, Negative for lower leg edema. GI: Abdomen is soft and non-tender, non-distended. PERIPHERAL VASCULAR: No intermittent claudication or unusual leg cramps. MUSCULOSKELETAL: Occasional arthralgias, myalgias. NEUROLOGICAL: Denies any headache, near syncope, seizures or syncope. HEMATOLOGIC:  No unusual bruising or bleeding. PSYCH: Denies any homicidal or suicidial ideations.     Medications:  Reviewed    Infusion Medications    lactated ringers 75 mL/hr at 08/13/20 0518    dextrose       Scheduled Medications    phosphorus replacement protocol   Other RX Placeholder    magnesium replacement protocol   Other RX Placeholder    lidocaine 1 % injection  5 mL Intradermal Once    ipratropium-albuterol  1 ampule Inhalation Q4H WA    insulin lispro  0-6 Units Subcutaneous Q6H    ketorolac  15 mg Intravenous Q6H    famotidine (PEPCID) injection  20 mg Intravenous BID    calcium replacement protocol   Other RX Placeholder    sodium chloride flush  10 mL Intravenous 2 times per day    enoxaparin  40 mg Subcutaneous Daily    cefepime  2 g Intravenous Q12H    metroNIDAZOLE  500 mg Intravenous Q8H     PRN Meds: albuterol sulfate HFA, morphine **OR** morphine, bisacodyl, [Held by provider] HYDROcodone-acetaminophen, fentanNYL, acetaminophen **OR** acetaminophen, potassium chloride, potassium chloride, potassium chloride **OR** potassium alternative oral replacement **OR** potassium chloride, glucose, dextrose, glucagon (rDNA), dextrose, sodium chloride flush, promethazine **OR** ondansetron      Intake/Output Summary (Last 24 hours) at 8/14/2020 0918  Last data filed at 8/14/2020 0255  Gross per 24 hour   Intake 2216 ml   Output 1550 ml   Net 666 ml       Diet:  DIET CLEAR LIQUID;    Exam:  BP (!) 144/67   Pulse 92   Temp 96.6 °F (35.9 °C) (Axillary)   Resp 16   Ht 5' 8\" (1.727 m)   Wt 221 lb 14.4 oz (100.7 kg)   SpO2 94%   BMI 33.74 kg/m²     General appearance: Alert and appropriate, acutely ill-appearing pleasant obese female. No apparent distress, appears stated age and cooperative. Patient does appear fatigued. HEENT: Pupils equal, round, and reactive to light. Conjunctivae/corneas clear. Neck: Supple, with full range of motion. No jugular venous distention. Trachea midline. Respiratory:  Normal respiratory effort. Clear to auscultation, bilaterally without Rales/Wheezes/Rhonchi. Cardiovascular: Regular rate and rhythm with normal S1/S2 without murmurs, rubs or gallops. Abdomen: Soft, non-tender, non-distended with normal bowel sounds. Surgical site dry without signs of infection. Musculoskeletal: Passive and active ROM x 4 extremities.   Skin: Skin color, texture, turgor 50-75 08/08/2020    BLOODU MODERATE 08/08/2020    SPECGRAV 1.015 11/09/2019    GLUCOSEU NEGATIVE 08/08/2020       Radiology:  XR CHEST PORTABLE   Final Result   Minimal opacity in the left lung base possible atelectasis, minimal infiltrate or effusion. **This report has been created using voice recognition software. It may contain minor errors which are inherent in voice recognition technology. **      Final report electronically signed by Dr. Erma Calderon on 8/13/2020 2:13 PM      XR ABDOMEN (KUB) (SINGLE AP VIEW)   Final Result   Increased gaseous distention throughout the small bowel loops. This is likely related to postsurgical ileus although a bowel obstruction can't be excluded. **This report has been created using voice recognition software. It may contain minor errors which are inherent in voice recognition technology. **      Final report electronically signed by Dr Patel Keller on 8/12/2020 9:16 AM      XR CHEST PORTABLE   Final Result      Improved lateral left basilar infiltrate/atelectasis/small left pleural effusion. Mild atelectasis at the medial right lung base. Possible left hilar adenopathy which appears to be mildly improved. **This report has been created using voice recognition software. It may contain minor errors which are inherent in voice recognition technology. **      Final report electronically signed by Dr. Staci Barker on 8/10/2020 5:19 AM      XR ABDOMEN (KUB) (SINGLE AP VIEW)   Final Result      Non obstructive bowel gas pattern. No evidence of an ileus. **This report has been created using voice recognition software. It may contain minor errors which are inherent in voice recognition technology. **      Final report electronically signed by Dr. Staci Barker on 8/10/2020 3:26 AM      XR CHEST PORTABLE   Final Result   ET node G-tube positions as above. Left lower lobe atelectasis or infiltrate.             **This report has been created using voice recognition software. It may contain minor errors which are inherent in voice recognition technology. **      Final report electronically signed by Dr. Diana Del Rio on 8/8/2020 9:54 AM      XR CHEST PORTABLE   Final Result      Interval placement of a right jugular central venous catheter, tip in the mid SVC, no pneumothorax. New opacity at the lateral left lung base which may represent a small infiltrate, atelectasis or small left pleural effusion. Interstitial prominence, acute versus chronic. See discussion above and correlate clinically. **This report has been created using voice recognition software. It may contain minor errors which are inherent in voice recognition technology. **      Final report electronically signed by Dr. Marilu Rodriges on 8/8/2020 5:53 AM      XR CHEST 1 VIEW   Final Result   No confluent infiltrate. PA and lateral radiographs could be performed if indicated clinically. **This report has been created using voice recognition software. It may contain minor errors which are inherent in voice recognition technology. **      Final report electronically signed by Dr. Larry Oswald on 8/7/2020 1:30 PM      US NON OB TRANSVAGINAL   Final Result      Large complex lesion in the left ovary could relate to abscess or hemorrhagic neoplasm either benign or malignant. Lack of significant ascites mitigates against malignancy. **This report has been created using voice recognition software. It may contain minor errors which are inherent in voice recognition technology. **      Final report electronically signed by Dr. Larry Oswald on 8/7/2020 1:44 PM      US DUP ABD PEL RETRO SCROT COMPLETE   Final Result      Large complex lesion in the left ovary could relate to abscess or hemorrhagic neoplasm either benign or malignant. Lack of significant ascites mitigates against malignancy. **This report has been created using voice recognition software. It may contain minor errors which are inherent in voice recognition technology. **      Final report electronically signed by Dr. Hilda Negron on 8/7/2020 1:44 PM      CT ABDOMEN PELVIS W IV CONTRAST Additional Contrast? None   Final Result   Lingular pneumonia. Cholelithiasis. Large pelvic mass which is mildly inflamed likely relating to the left ovary. Benign and malignant etiologies are possible. **This report has been created using voice recognition software. It may contain minor errors which are inherent in voice recognition technology. **      Final report electronically signed by Dr. Hilda Negron on 8/7/2020 1:23 PM        Us Non Ob Transvaginal    Result Date: 8/7/2020  PROCEDURE: US NON OB TRANSVAGINAL, US DUP ABD PEL RETRO SCROT COMPLETE CLINICAL INFORMATION: cervical mass, vaginal bleeding. Fever COMPARISON: CT 8/7/2020 TECHNIQUE: Transvaginal images were performed . FINDINGS: LMP - 20-30 years ago Uterus - 7.8 x 3.4 cm Endometrium - 1 cm Right Ovary - 4.3 x 1.9 x 1.6 cm Left Ovary - 13.6 x 9.2 x 8.5 cm The uterus is normal. A 2.7 cm hypoechoic area is noted near or within the cervix. It does not represent a simple. The endometrium is normal.  There is a small amount of simple appearing pelvic free fluid. The right ovary is of normal size. There are small cysts and follicles, the largest measuring 2.5 cm  There is normal color flow. There is no adnexal mass. The left ovary is of normal size. There is an 11.5 cm mass within the left ovary. It has a large amount of fairly homogeneous layering material. Hemorrhage could cause this as could pus. There is normal color flow. There is no adnexal mass. Large complex lesion in the left ovary could relate to abscess or hemorrhagic neoplasm either benign or malignant. Lack of significant ascites mitigates against malignancy. **This report has been created using voice recognition software.  It may contain minor errors which are inherent in voice recognition technology. ** Final report electronically signed by Dr. Cailin Bowman on 8/7/2020 1:44 PM    Ct Abdomen Pelvis W Iv Contrast Additional Contrast? None    Result Date: 8/7/2020  PROCEDURE: CT ABDOMEN PELVIS W IV CONTRAST CLINICAL INFORMATION: abdominal pain, pelvic pain . COMPARISON: None. TECHNIQUE: 5 mm axial CT images were obtained through the abdomen and pelvis after the administration of intravenous and oral contrast. Coronal and sagittal reconstructions were obtained. All CT scans at this facility use dose modulation, iterative reconstruction, and/or weight-based dosing when appropriate to reduce radiation dose to as low as reasonably achievable. FINDINGS Lung base: Mild groundglass airspace opacities within the lingula suggest pneumonia. Mild left lower lobe scar. Liver and spleen: Mild fatty infiltration. Biliary: Multiple gallstones. Pancreas: head, body, and tail unremarkable. Adrenals: symmetric, no calcifications or masses. Kidneys: reniform, no hyperdense masses, large  calculi, or hydronephrosis. Aorta: normal caliber. Lymph Nodes: normal size. Bowel: Normal caliber. No evidence of obstruction. Mild sigmoid diverticulosis. Appendix is borderline enlarged though is not inflamed and thickened. Bladder: Normal Reproductive: A septated cystic mass within the left pelvis measures 13.5 cm x 9.0 cm. This most likely relates to the left ovary. A uterine source is also possible. This focus has mild adjacent inflammation. There is a candidate for right ovary contains multiple small follicles. The uterus contains a low attenuation focus inferiorly which could relate to nabothian cyst measuring 19 mm. Bones: Fusion at L5-S1. Lingular pneumonia. Cholelithiasis. Large pelvic mass which is mildly inflamed likely relating to the left ovary. Benign and malignant etiologies are possible. **This report has been created using voice recognition software.  It may contain minor errors which are inherent in voice recognition technology. ** Final report electronically signed by Dr. Malina Fritz on 8/7/2020 1:23 PM    Xr Chest Portable    Result Date: 8/8/2020  PROCEDURE: XR CHEST PORTABLE CLINICAL INFORMATION: ett placement . COMPARISON: No prior study. TECHNIQUE: Portable semiupright FINDINGS: Endotracheal tube is 4 cm above the kirby. Central line is unchanged. NG tube extends into the stomach. Multiple EKG leads overlie the chest. Patient is rotated. There is left lower lobe atelectasis or infiltrate. Pulmonary vessels are not congested. Right lung is clear. ET node G-tube positions as above. Left lower lobe atelectasis or infiltrate. **This report has been created using voice recognition software. It may contain minor errors which are inherent in voice recognition technology. ** Final report electronically signed by Dr. Monica Alcocer on 8/8/2020 9:54 AM    Xr Chest Portable    Result Date: 8/8/2020  PROCEDURE: XR CHEST PORTABLE CLINICAL INFORMATION: CVC placement. COMPARISON: Chest x-ray dated 8/7/2020 TECHNIQUE: AP Portable chest xray FINDINGS: Lines/tubes/devices: Interval placement of a right jugular central venous catheter, tip in the region of the mid SVC. Lungs/pleura: There are mild bilateral interstitial infiltrates which probably represent interstitial pulmonary fibrosis however cannot exclude mild interstitial pulmonary edema or an atypical viral-type pneumonia. Correlate clinically. There is a new patchy opacity at the lateral left lung base obscuring the costophrenic angle which may represent a small pneumonia, atelectasis, or tiny left pleural effusion. There is no pneumothorax. Heart: Heart size is normal. Mediastinum/filiberto: No obvious mass or adenopathy. Skeleton: No significant bone or joint abnormality. Interval placement of a right jugular central venous catheter, tip in the mid SVC, no pneumothorax.  New opacity at the lateral left lung base which may represent a small cause this as could pus. There is normal color flow. There is no adnexal mass. Large complex lesion in the left ovary could relate to abscess or hemorrhagic neoplasm either benign or malignant. Lack of significant ascites mitigates against malignancy. **This report has been created using voice recognition software. It may contain minor errors which are inherent in voice recognition technology. ** Final report electronically signed by Dr. Raquel Holcomb on 8/7/2020 1:44 PM      **This report has been created using voice recognition software. It may contain minor errors which are inherent in voice recognition technology. **  Electronically signed by KYLE Tapia CNP on 8/14/2020 at 9:18 AM

## 2020-08-14 NOTE — FLOWSHEET NOTE
Geraldine Gotti 58  PHYSICAL THERAPY MISSED TREATMENT NOTE  [facility] 3A    Date: 2020  Patient Name: Karie Paula        MRN: [id number]   : 1960  (62 y.o.)  Gender: female   Referring Practitioner: JAISON Granda CNP  Diagnosis: ovarian mass         REASON FOR MISSED TREATMENT:  RN requested to hold at this time d/t moving pt and CPAP trials. Will attempt back when pt is appropriate.           Rosalinda Du PTA [id number]

## 2020-08-14 NOTE — PLAN OF CARE
Problem: Nutrition  Goal: Optimal nutrition therapy  8/14/2020 1057 by Nandini Shah RD, LD  Outcome: Ongoing   Nutrition Problem #1: Inadequate oral intake  Intervention: Food and/or Nutrient Delivery: Continue Current Diet, Start Parenteral Nutrititon  Nutritional Goals: Pt will tolerate adequate nutrition support to meet 75% or more of estimated nutritional needs during LOS until able to transition to solely po feed

## 2020-08-14 NOTE — CARE COORDINATION
8/14/20, 1:55 PM EDT    DISCHARGE PLANNING EVALUATION    Pt transferred to 3A from [facility] LUI asked BOOGIE to place social security Disability forms on chart for family to take to surgeon's office to complete. BOOGIE spoke with nurse, son is coming in later this evening and will give forms to him.

## 2020-08-14 NOTE — PROGRESS NOTES
[facility]      Patient - Joy Jackson   MRN -  [id number]   [place] # - [de-identified]   - 1960      Date of Admission -  2020 10:16 AM  Date of evaluation -  2020  Room - [address] [place] [address] Yessi, APRN - CNP Primary Care Physician - Poli Allen MD     Problem List      Active Hospital Problems    Diagnosis Date Noted    Sleep apnea [G47.30]     Stage 3 severe COPD by GOLD classification [facility]) [J44.9]     Chronic hypercapnic respiratory failure ([facility] [J96.12]     Septic shock ([facility] [A41.9, R65.21]     Ovarian abscess [N70.92] 2020    Panlobular emphysema ([facility] [J43.1] 03/15/2018     Reason for Consult    CO2 Retention  HPI   History Obtained From: Patient and electronic medical record. Joy Jackson is a 62 y.o. female presented to [facility] ER on 2020 with complaints of abdominal pain, fever, vaginal bleeding. Transvaginal ultrasound noted large complex lesion in the left ovary could relate to abscess or hemorrhagic neoplasm. Evaluation occurred by Dr. Winnie Mcdaniel, and endocervical polyp was removed and sent for pathology. Patient was sent to [facility] ICU stepdown. On 2020, patient was hypotensive and not responsive to 2 L of 0.09 normal saline systolic blood pressure remained 60-74 with a heart rate of 120. Levophed drip was started and patient was admitted to the ICU for further evaluation and care. On 2020, exploratory laparotomy with left salpingo-oophorectomy and small bowel exploration for ovarian abscess per Dr. Winnie Mcdaniel. Patient was returned to ICU on mechanical ventilation. Patient was extubated on 2020 and on 2020 patient was stable to be transferred out of ICU. Pulmonary medicine was consulted because of a high pCO2 on ABG. During our encounter, patient complains of shortness of breath and a dry cough. Patient denies chest pain, hemoptysis, leg swelling.   Patient states that she noticed herself snoring at night. She states that she gets up in the middle of the night to use the restroom. Patient also states that she takes naps during the day and feels sleepy during the day. Patient states that she was diagnosed with COPD by her family physician Dr. Sasha Siddiqui. She states that she has an albuterol inhaler and is not on home O2. Patient has smoked half a pack of cigarettes for 20 years. She states that she quit last year. Past 24 hours:  Patient states that she had a low grade fever. She endorses a dry cough and bilateral leg swelling. Denies SOB, chest pain, and hemoptysis.     PMHx   Past Medical History      Diagnosis Date    Allergic rhinitis     Chronic back pain     Headache(784.0)     Panlobular emphysema ([address] 3/15/2018      Past Surgical History        Procedure Laterality Date    BACK SURGERY      COLONOSCOPY Left 8/2/2019    COLONOSCOPY POLYPECTOMY SNARE/COLD BIOPSY performed by Carie Camarena MD at [facility]    LAPAROTOMY N/A 8/8/2020    LAPAROTOMY EXPLORATORY, LEFT SALPINGO OOPHORECTOMY, SMALL BOWEL EXPLORATION performed by Gerard Walker MD at [address]     Meds    Current Medications    phosphorus replacement protocol   Other RX Placeholder    magnesium replacement protocol   Other RX Placeholder    lidocaine 1 % injection  5 mL Intradermal Once    ipratropium-albuterol  1 ampule Inhalation Q4H WA    insulin lispro  0-6 Units Subcutaneous Q6H    ketorolac  15 mg Intravenous Q6H    famotidine (PEPCID) injection  20 mg Intravenous BID    calcium replacement protocol   Other RX Placeholder    sodium chloride flush  10 mL Intravenous 2 times per day    enoxaparin  40 mg Subcutaneous Daily    cefepime  2 g Intravenous Q12H    metroNIDAZOLE  500 mg Intravenous Q8H     albuterol sulfate HFA, morphine **OR** morphine, bisacodyl, [Held by provider] HYDROcodone-acetaminophen, fentanNYL, acetaminophen **OR** acetaminophen, potassium chloride, potassium chloride, potassium chloride **OR** potassium alternative oral replacement **OR** potassium chloride, glucose, dextrose, glucagon (rDNA), dextrose, sodium chloride flush, promethazine **OR** ondansetron  IV Drips/Infusions   lactated ringers 75 mL/hr at 20 0518    dextrose       Home Medications  Medications Prior to Admission: famotidine (PEPCID) 20 MG tablet, Take 20 mg by mouth 2 times daily  albuterol sulfate  (90 Base) MCG/ACT inhaler, INHALE 2 PUFFS BY MOUTH INTO THE LUNGS EVERY 6 HOURS AS NEEDED FOR WHEEZING  nicotine polacrilex (EQL NICOTINE) 4 MG lozenge, Take 1 lozenge by mouth as needed for Smoking cessation  Cyclobenzaprine HCl (FLEXERIL PO), Take  by mouth nightly. oxyCODONE-acetaminophen (PERCOCET) 7.5-325 MG per tablet, Take 1 tablet by mouth 2 times daily as needed. ibuprofen (ADVIL;MOTRIN) 200 MG tablet, Take 400 mg by mouth every 8 hours as needed. Pseudoephedrine-Guaifenesin (MUCINEX D PO), Take by mouth  Diet    DIET CLEAR LIQUID; Allergies    Latex; Flu virus vaccine;  Fluzone [influenza vac split quad]; Sulfa antibiotics; and Lead (elemental)  Social History     Social History     Socioeconomic History    Marital status: Single     Spouse name: Not on file    Number of children: 1    Years of education: 15    Highest education level: High school graduate   Occupational History    Not on file   Social Needs    Financial resource strain: Not hard at all   Lili-Shyam insecurity     Worry: Never true     Inability: Never true    Transportation needs     Medical: No     Non-medical: No   Tobacco Use    Smoking status: Former Smoker     Packs/day: 0.50     Years: 40.00     Pack years: 20.00     Types: Cigarettes     Start date: 1978     Last attempt to quit: 2018     Years since quittin.3    Smokeless tobacco: Never Used   Substance and Sexual Activity    Alcohol use: No     Alcohol/week: 0.0 standard drinks    Drug use: No    Sexual activity: Never   Lifestyle    Physical activity     Days per week: Not on file     Minutes per session: Not on file    Stress: Not on file   Relationships    Social connections     Talks on phone: Not on file     Gets together: Not on file     Attends Gnosticism service: Not on file     Active member of club or organization: Not on file     Attends meetings of clubs or organizations: Not on file     Relationship status: Not on file    Intimate partner violence     Fear of current or ex partner: Not on file     Emotionally abused: Not on file     Physically abused: Not on file     Forced sexual activity: Not on file   Other Topics Concern    Not on file   Social History Narrative    Not on file     Family History          Problem Relation Age of Onset    High Blood Pressure Mother     Asthma Mother     High Blood Pressure Father     Diabetes Maternal Grandmother      Sleep History    Never diagnosed with sleep apnea in the past.  Occupational history   Occupation:  She is current working: Yes  Type of profession: full time job doing . History of tobacco smoking:Yes  Amount of tobacco smokin.5 PPD. Years of tobacco smokin.                                    Quit smoking: Yes.               Quit [id number]   Current smoker: No.         History of recreational or IV drug use in the past:NO     History of exposure to coal mines/coal dust: NO  History of exposure to foundry dust/welding: NO  History of exposure to quarry/silica/sandblasting: NO  History of exposure to asbestos/working with breaks/ships: NO  History of exposure to farm dust: NO  History of recent travel to long distances: NO  History of exposure to birds, pigeons, or chickens in the past:NO      History of pulmonary embolism in the past: No            History of DVT in the past:No          Vitals     height is 5' 8\" (1.727 m) and weight is 221 lb 14.4 oz (100.7 kg). Her axillary temperature is 96.6 °F (35.9 °C). Her blood pressure is 144/67 (abnormal) and her pulse is 92. Her respiration is 16 and oxygen saturation is 94%. Body mass index is 33.74 kg/m². SUPPLEMENTAL O2: O2 Flow Rate (L/min): 1 L/min     I/O        Intake/Output Summary (Last 24 hours) at 8/14/2020 1002  Last data filed at 8/14/2020 0255  Gross per 24 hour   Intake 2216 ml   Output 1550 ml   Net 666 ml     I/O last 3 completed shifts: In: 2216 [P.O.:170; I.V.:2046]  Out: 1550 [BLPOB:8628]   Patient Vitals for the past 96 hrs (Last 3 readings):   Weight   08/13/20 0600 221 lb 14.4 oz (100.7 kg)   08/11/20 0400 221 lb 5.5 oz (100.4 kg)       Exam   Nursing note and vitals reviewed. Constitutional: Patient is oriented to person, place, and time. Patient appears well-developed and well-nourished. No distress on 1 lpm of O2 via NC. Head: Normocephalic and atraumatic. Mouth/Throat: Oropharynx is clear and moist. No oropharyngeal exudate. No oral thrush. Eyes: Conjunctivae are normal. Pupils are equal, round, and reactive to light. Right eye exhibits no discharge. Left eye exhibits no discharge. No scleral icterus. Neck: Neck supple. No JVD present. No tracheal deviation present. No thyromegaly present. Cardiovascular: Normal rate, regular rhythm, normal heart sounds. Exam reveals no gallop and no friction rub. No murmur heard. Pulmonary/Chest: Effort normal. No stridor. No respiratory distress. No wheezes. No rales. Patient exhibits no tenderness. Decreased breath sounds bilateral lung bases. Abdominal: Soft. Bowel sounds are normal. Patient exhibits no distension and no mass. No tenderness. Patient has no rebound and no guarding. Musculoskeletal: Normal range of motion. Extremities: Patient exhibits no edema and no tenderness. Lymphadenopathy: No cervical adenopathy. Neurological: Patient is alert and oriented to person, place, and time. Skin: Skin is warm and dry. No rash noted. Patient is not diaphoretic. Psychiatric: Patient  has a normal mood and affect. Patient behavior is normal.       Labs  - Old records and notes have been reviewed in [facility]   ABG  Lab Results   Component Value Date    PH 7.38 08/14/2020    PO2 68 08/14/2020    PCO2 60 08/14/2020    HCO3 35 08/14/2020    O2SAT 92 08/14/2020     Lab Results   Component Value Date    IFIO2 1 08/14/2020    MODE PC 08/10/2020    SETPEEP 6.0 08/10/2020     CBC  Recent Labs     08/12/20  0610 08/13/20  0514 08/14/20  0415   WBC 14.1* 15.1* 17.5*   RBC 3.67* 3.44* 3.34*   HGB 10.9* 10.2* 10.0*   HCT 36.3* 34.7* 33.3*   MCV 98.9 100.9* 99.7*   MCH 29.7 29.7 29.9   MCHC 30.0* 29.4* 30.0*    265 335   MPV 10.3 11.0 10.2      BMP  Recent Labs     08/12/20  0610 08/13/20  0514 08/14/20  0415    144 143   K 4.0 4.1 3.9    102 101   CO2 35* 37* 36*   BUN 32* 40* 38*   CREATININE 0.4 0.5 0.5   GLUCOSE 129* 115* 125*   PHOS 2.0* 2.1*  --    CALCIUM 8.0* 7.8* 7.6*     LFT  No results for input(s): AST, ALT, ALB, BILITOT, ALKPHOS, LIPASE in the last 72 hours. Invalid input(s): AMYLASE  TROP  No results found for: TROPONINT  BNP  No results for input(s): BNP in the last 72 hours. Lactic Acid  No results for input(s): LACTA in the last 72 hours. INR  No results for input(s): INR, PROTIME in the last 72 hours. PTT  No results for input(s): APTT in the last 72 hours. Glucose  Recent Labs     08/13/20  1912 08/14/20  0304 08/14/20  0727   POCGLU 159* 118* 146*     UA No results for input(s): SPECGRAV, PHUR, COLORU, CLARITYU, MUCUS, PROTEINU, BLOODU, RBCUA, WBCUA, BACTERIA, NITRU, GLUCOSEU, BILIRUBINUR, UROBILINOGEN, KETUA, LABCAST, LABCASTTY, AMORPHOS in the last 72 hours. Invalid input(s): CRYSTALS. PFTs               Sleep studies   None in Epic.     Cultures    Pneumonia Panel, Molecular - 8/8/2020   Source ET aspirates   Pseudomonas aeruginosa by PCR DetectedAbnormal      Culture, Urine - 8/8/2020   Urine Culture,

## 2020-08-14 NOTE — PROGRESS NOTES
Pharmacy Consult       TPN    Ordering Provider: Dr. Rocio Hanson    Indication for TPN: post-op ileus    Macronutrients   DW: 73 kg   AA: 1.2 g/kg   Total Kcal: 10 Kcal/kg   Lipids: 20 % of Total Kcal   Infusion Rate: 75 mL/hr    Electrolytes (per bag)   Na Acetate:    80 meq   NaCl:         80 meq   NaPhos:       15 mmol     KCl:               80 meq     Calcium Gluc:   6.98 meq   Magnesium:      12.18 meq      MV:      10 mL    Electrolyte Replacement: none    Assessment/ Plan:  Central line administration  Recheck Mag, Phos, BMP, and iCal 08/15/2020  Maintenance fluid (LR) discontinued per Dr. Claudette Rosa      Electronically signed by Lisa Parada [facility] on 8/14/2020 at 12:21 PM

## 2020-08-14 NOTE — PLAN OF CARE
G[address]ine G[address] 58  OCCUPATIONAL THERAPY MISSED TREATMENT NOTE  [facility]U 3A  3A-05/005-A      Date: 2020  Patient Name: Nella Dennis        CSN: [id number]   : 1960  (62 y.o.)  Gender: female   Referring Practitioner: LAURE WRIGHT CNP  Diagnosis: ovarian mass         REASON FOR MISSED TREATMENT: Hold Treatment per Nursing   Pt was being transferred to another unit and having trials of CPAP today. Will retry another day as she is able to participate.

## 2020-08-15 ENCOUNTER — APPOINTMENT (OUTPATIENT)
Dept: GENERAL RADIOLOGY | Age: 60
DRG: 853 | End: 2020-08-15
Payer: COMMERCIAL

## 2020-08-15 LAB
ANION GAP SERPL CALCULATED.3IONS-SCNC: 4 MEQ/L (ref 8–16)
BUN BLDV-MCNC: 34 MG/DL (ref 7–22)
CALCIUM IONIZED: 1.03 MMOL/L (ref 1.12–1.32)
CALCIUM SERPL-MCNC: 7.2 MG/DL (ref 8.5–10.5)
CHLORIDE BLD-SCNC: 105 MEQ/L (ref 98–111)
CO2: 31 MEQ/L (ref 23–33)
CREAT SERPL-MCNC: 0.4 MG/DL (ref 0.4–1.2)
ERYTHROCYTE [DISTWIDTH] IN BLOOD BY AUTOMATED COUNT: 13.1 % (ref 11.5–14.5)
ERYTHROCYTE [DISTWIDTH] IN BLOOD BY AUTOMATED COUNT: 48 FL (ref 35–45)
GFR SERPL CREATININE-BSD FRML MDRD: > 90 ML/MIN/1.73M2
GLUCOSE BLD-MCNC: 129 MG/DL (ref 70–108)
GLUCOSE BLD-MCNC: 149 MG/DL (ref 70–108)
GLUCOSE BLD-MCNC: 151 MG/DL (ref 70–108)
GLUCOSE BLD-MCNC: 157 MG/DL (ref 70–108)
GLUCOSE BLD-MCNC: 203 MG/DL (ref 70–108)
HCT VFR BLD CALC: 30.3 % (ref 37–47)
HEMOGLOBIN: 8.9 GM/DL (ref 12–16)
MAGNESIUM: 2.1 MG/DL (ref 1.6–2.4)
MCH RBC QN AUTO: 29.7 PG (ref 26–33)
MCHC RBC AUTO-ENTMCNC: 29.4 GM/DL (ref 32.2–35.5)
MCV RBC AUTO: 101 FL (ref 81–99)
PHOSPHORUS: 2.1 MG/DL (ref 2.4–4.7)
PLATELET # BLD: 313 THOU/MM3 (ref 130–400)
PMV BLD AUTO: 10.5 FL (ref 9.4–12.4)
POTASSIUM SERPL-SCNC: 4.3 MEQ/L (ref 3.5–5.2)
RBC # BLD: 3 MILL/MM3 (ref 4.2–5.4)
SODIUM BLD-SCNC: 140 MEQ/L (ref 135–145)
WBC # BLD: 12.3 THOU/MM3 (ref 4.8–10.8)

## 2020-08-15 PROCEDURE — 6360000002 HC RX W HCPCS: Performed by: PHARMACIST

## 2020-08-15 PROCEDURE — 2500000003 HC RX 250 WO HCPCS: Performed by: OBSTETRICS & GYNECOLOGY

## 2020-08-15 PROCEDURE — 94669 MECHANICAL CHEST WALL OSCILL: CPT

## 2020-08-15 PROCEDURE — 36415 COLL VENOUS BLD VENIPUNCTURE: CPT

## 2020-08-15 PROCEDURE — 82948 REAGENT STRIP/BLOOD GLUCOSE: CPT

## 2020-08-15 PROCEDURE — 6360000002 HC RX W HCPCS: Performed by: OBSTETRICS & GYNECOLOGY

## 2020-08-15 PROCEDURE — 85027 COMPLETE CBC AUTOMATED: CPT

## 2020-08-15 PROCEDURE — 2580000003 HC RX 258: Performed by: OBSTETRICS & GYNECOLOGY

## 2020-08-15 PROCEDURE — 6360000002 HC RX W HCPCS: Performed by: INTERNAL MEDICINE

## 2020-08-15 PROCEDURE — 80048 BASIC METABOLIC PNL TOTAL CA: CPT

## 2020-08-15 PROCEDURE — 36592 COLLECT BLOOD FROM PICC: CPT

## 2020-08-15 PROCEDURE — 2500000003 HC RX 250 WO HCPCS: Performed by: NURSE PRACTITIONER

## 2020-08-15 PROCEDURE — 6370000000 HC RX 637 (ALT 250 FOR IP): Performed by: INTERNAL MEDICINE

## 2020-08-15 PROCEDURE — 2500000003 HC RX 250 WO HCPCS: Performed by: PHARMACIST

## 2020-08-15 PROCEDURE — 2580000003 HC RX 258: Performed by: PHARMACIST

## 2020-08-15 PROCEDURE — 82330 ASSAY OF CALCIUM: CPT

## 2020-08-15 PROCEDURE — 99024 POSTOP FOLLOW-UP VISIT: CPT | Performed by: SURGERY

## 2020-08-15 PROCEDURE — 84100 ASSAY OF PHOSPHORUS: CPT

## 2020-08-15 PROCEDURE — 71046 X-RAY EXAM CHEST 2 VIEWS: CPT

## 2020-08-15 PROCEDURE — 94761 N-INVAS EAR/PLS OXIMETRY MLT: CPT

## 2020-08-15 PROCEDURE — 99232 SBSQ HOSP IP/OBS MODERATE 35: CPT | Performed by: INTERNAL MEDICINE

## 2020-08-15 PROCEDURE — 2140000000 HC CCU INTERMEDIATE R&B

## 2020-08-15 PROCEDURE — 83735 ASSAY OF MAGNESIUM: CPT

## 2020-08-15 RX ORDER — FUROSEMIDE 10 MG/ML
20 INJECTION INTRAMUSCULAR; INTRAVENOUS 2 TIMES DAILY
Status: DISCONTINUED | OUTPATIENT
Start: 2020-08-15 | End: 2020-08-16

## 2020-08-15 RX ADMIN — ENOXAPARIN SODIUM 40 MG: 40 INJECTION SUBCUTANEOUS at 20:26

## 2020-08-15 RX ADMIN — KETOROLAC TROMETHAMINE 15 MG: 30 INJECTION, SOLUTION INTRAMUSCULAR at 01:12

## 2020-08-15 RX ADMIN — IPRATROPIUM BROMIDE AND ALBUTEROL SULFATE 1 AMPULE: .5; 3 SOLUTION RESPIRATORY (INHALATION) at 07:48

## 2020-08-15 RX ADMIN — METRONIDAZOLE 500 MG: 500 INJECTION, SOLUTION INTRAVENOUS at 23:55

## 2020-08-15 RX ADMIN — SODIUM CHLORIDE, PRESERVATIVE FREE 10 ML: 5 INJECTION INTRAVENOUS at 20:39

## 2020-08-15 RX ADMIN — KETOROLAC TROMETHAMINE 15 MG: 30 INJECTION, SOLUTION INTRAMUSCULAR at 12:31

## 2020-08-15 RX ADMIN — IPRATROPIUM BROMIDE AND ALBUTEROL SULFATE 1 AMPULE: .5; 3 SOLUTION RESPIRATORY (INHALATION) at 16:11

## 2020-08-15 RX ADMIN — FAMOTIDINE 20 MG: 10 INJECTION, SOLUTION INTRAVENOUS at 08:26

## 2020-08-15 RX ADMIN — CALCIUM GLUCONATE: 98 INJECTION, SOLUTION INTRAVENOUS at 18:12

## 2020-08-15 RX ADMIN — CALCIUM GLUCONATE 1 G: 98 INJECTION, SOLUTION INTRAVENOUS at 09:04

## 2020-08-15 RX ADMIN — METRONIDAZOLE 500 MG: 500 INJECTION, SOLUTION INTRAVENOUS at 16:04

## 2020-08-15 RX ADMIN — CEFEPIME 2 G: 2 INJECTION, POWDER, FOR SOLUTION INTRAMUSCULAR; INTRAVENOUS at 01:09

## 2020-08-15 RX ADMIN — KETOROLAC TROMETHAMINE 15 MG: 30 INJECTION, SOLUTION INTRAMUSCULAR at 06:43

## 2020-08-15 RX ADMIN — FUROSEMIDE 20 MG: 10 INJECTION, SOLUTION INTRAMUSCULAR; INTRAVENOUS at 18:12

## 2020-08-15 RX ADMIN — METRONIDAZOLE 500 MG: 500 INJECTION, SOLUTION INTRAVENOUS at 00:04

## 2020-08-15 RX ADMIN — IPRATROPIUM BROMIDE AND ALBUTEROL SULFATE 1 AMPULE: .5; 3 SOLUTION RESPIRATORY (INHALATION) at 12:44

## 2020-08-15 RX ADMIN — CEFEPIME 2 G: 2 INJECTION, POWDER, FOR SOLUTION INTRAMUSCULAR; INTRAVENOUS at 14:14

## 2020-08-15 RX ADMIN — FAMOTIDINE 20 MG: 10 INJECTION, SOLUTION INTRAVENOUS at 20:39

## 2020-08-15 RX ADMIN — METRONIDAZOLE 500 MG: 500 INJECTION, SOLUTION INTRAVENOUS at 08:26

## 2020-08-15 RX ADMIN — SODIUM PHOSPHATE, MONOBASIC, MONOHYDRATE 15 MMOL: 276; 142 INJECTION, SOLUTION INTRAVENOUS at 10:12

## 2020-08-15 RX ADMIN — KETOROLAC TROMETHAMINE 15 MG: 30 INJECTION, SOLUTION INTRAMUSCULAR at 18:12

## 2020-08-15 ASSESSMENT — PAIN SCALES - GENERAL
PAINLEVEL_OUTOF10: 0
PAINLEVEL_OUTOF10: 0
PAINLEVEL_OUTOF10: 4
PAINLEVEL_OUTOF10: 3
PAINLEVEL_OUTOF10: 0
PAINLEVEL_OUTOF10: 0
PAINLEVEL_OUTOF10: 4
PAINLEVEL_OUTOF10: 2

## 2020-08-15 ASSESSMENT — PAIN DESCRIPTION - FREQUENCY: FREQUENCY: INTERMITTENT

## 2020-08-15 ASSESSMENT — PAIN DESCRIPTION - PAIN TYPE: TYPE: SURGICAL PAIN

## 2020-08-15 ASSESSMENT — PAIN - FUNCTIONAL ASSESSMENT
PAIN_FUNCTIONAL_ASSESSMENT: PREVENTS OR INTERFERES SOME ACTIVE ACTIVITIES AND ADLS
PAIN_FUNCTIONAL_ASSESSMENT: PREVENTS OR INTERFERES SOME ACTIVE ACTIVITIES AND ADLS

## 2020-08-15 ASSESSMENT — PAIN DESCRIPTION - ONSET: ONSET: GRADUAL

## 2020-08-15 ASSESSMENT — PAIN DESCRIPTION - DESCRIPTORS: DESCRIPTORS: DISCOMFORT

## 2020-08-15 ASSESSMENT — PAIN DESCRIPTION - ORIENTATION: ORIENTATION: MID

## 2020-08-15 ASSESSMENT — PAIN DESCRIPTION - LOCATION: LOCATION: ABDOMEN

## 2020-08-15 ASSESSMENT — PAIN DESCRIPTION - PROGRESSION
CLINICAL_PROGRESSION: RESOLVED
CLINICAL_PROGRESSION: GRADUALLY WORSENING

## 2020-08-15 NOTE — PROGRESS NOTES
Assessment and Plan:        1. Ovarian abscess- s/p OR;  Starting clear liquids. Passing gas. No bm  2. Septic shock- resolved  3. Pos fluid balance- slow IV fluids, diuretic  4. Ileus-  Resolved. On TPN due to poor nutrition  5. elev CO2;  Pulmonary consulted and following. cpap at night. Her resp acidosis is compensated. 6. Anticipate long stay- ? [place]  7. Day 7 of antibiotics- will consider stop if wbc continues to fall. CC:  Abdominal pain, fever  HPI: pt in prior good health, presented with above. Ct showed ovarian mass. Went to OR. Has copd. ROS (12 point review of systems completed. Pertinent positives noted. Otherwise ROS is negative) : neg  PMH:  Per HPI.   copd  SHX:  Single, former smoker  FHX: diabetes  Allergies: See above    Medications:     PN-Adult  3 IN 1 Central Line (Custom)      PN-Adult  3 IN 1 Central Line (Custom) 75 mL/hr at 08/14/20 1840    dextrose        sodium phosphate IVPB  15 mmol Intravenous Once    furosemide  20 mg Intravenous BID    phosphorus replacement protocol   Other RX Placeholder    magnesium replacement protocol   Other RX Placeholder    lidocaine 1 % injection  5 mL Intradermal Once    ipratropium-albuterol  1 ampule Inhalation Q4H WA    insulin lispro  0-6 Units Subcutaneous Q6H    ketorolac  15 mg Intravenous Q6H    famotidine (PEPCID) injection  20 mg Intravenous BID    calcium replacement protocol   Other RX Placeholder    sodium chloride flush  10 mL Intravenous 2 times per day    enoxaparin  40 mg Subcutaneous Daily    cefepime  2 g Intravenous Q12H    metroNIDAZOLE  500 mg Intravenous Q8H       Vital Signs:   BP (!) 142/66   Pulse 92   Temp 99.1 °F (37.3 °C) (Oral)   Resp 17   Ht 5' 8\" (1.727 m)   Wt 221 lb 14.4 oz (100.7 kg)   SpO2 99%   BMI 33.74 kg/m²      Intake/Output Summary (Last 24 hours) at 8/15/2020 1306  Last data filed at 8/15/2020 0358  Gross per 24 hour   Intake 2625.8 ml   Output 1425 ml   Net 1200.8 ml Physical Examination: General appearance - chronically ill appearing  Mental status - alert, oriented to person, place, and time  Neck - supple, no significant adenopathy, no JVD, or carotid bruits  Chest - Breath sounds diminished bilaterally. Heart - no JVD, tachycardic  Abdomen - distended, very rare faint bowel sounds  Neurological - alert, oriented, normal speech, no focal findings or movement disorder noted  Musculoskeletal - no muscular tenderness noted  Extremities - no pedal edema noted  Skin - normal coloration and turgor, no rashes, no suspicious skin lesions noted    Data: (All radiographs, tracings, PFTs, and imaging are personally viewed and interpreted unless otherwise noted).     Labs pending      Electronically signed by Yvette Vo MD on 8/15/2020 at 1:06 PM

## 2020-08-15 NOTE — PLAN OF CARE
Problem: Pain:  Goal: Pain level will decrease  Description: Pain level will decrease  Note: Scheduled Toradol given. Effective. Pain goal 0/10. Repositioning and resting for comfort. Splinting abdomen with movement. Problem: Nutrition  Goal: Optimal nutrition therapy  Outcome: Ongoing  Note: Tolerating clear liquid diet. Problem: Musculor/Skeletal Functional Status  Goal: Highest potential functional level  Outcome: Ongoing  Note: PT working with patient. Patient can assist with turning. Problem: Discharge Planning:  Goal: Participates in care planning  Description: Participates in care planning  Outcome: Ongoing  Note: Plan is to return back to home with son at discharge and home health. Problem: Airway Clearance - Ineffective:  Goal: Ability to maintain a clear airway will improve  Description: Ability to maintain a clear airway will improve  Outcome: Ongoing  Note: Patient able to cough to clear own airway. Problem: Anxiety/Stress:  Goal: Level of anxiety will decrease  Description: Level of anxiety will decrease  Outcome: Ongoing  Note: Patient calm with no anxiety. Resting in bed. Problem: Aspiration:  Goal: Absence of aspiration  Description: Absence of aspiration  Outcome: Ongoing  Note: HOB 30 degrees with eating or drinking. Problem: Bowel Function - Altered:  Goal: Bowel elimination is within specified parameters  Description: Bowel elimination is within specified parameters  Outcome: Ongoing  Note: Bowel sounds active. No BM this shift. Problem: Cardiac Output - Decreased:  Goal: Hemodynamic stability will improve  Description: Hemodynamic stability will improve  Outcome: Ongoing  Note: VS stable.       Problem: Fluid Volume - Imbalance:  Goal: Absence of imbalanced fluid volume signs and symptoms  Description: Absence of imbalanced fluid volume signs and symptoms  Outcome: Ongoing  Note: LABS in AM.      Problem: Gas Exchange - Impaired:  Goal: Levels of oxygenation will improve  Description: Levels of oxygenation will improve  Outcome: Ongoing  Note: 2L NC. O2 saturation > 92%. Problem: Nutrition Deficit:  Goal: Ability to achieve adequate nutritional intake will improve  Description: Ability to achieve adequate nutritional intake will improve  Outcome: Ongoing  Note: TPN infusing at 75ml/hr. Problem: Pain:  Goal: Pain level will decrease  Description: Pain level will decrease  Note: Scheduled Toradol given. Effective. Pain goal 0/10. Repositioning and resting for comfort. Splinting abdomen with movement. Goal: Control of acute pain  Description: Control of acute pain  Outcome: Ongoing     Problem: Serum Glucose Level - Abnormal:  Goal: Ability to maintain appropriate glucose levels will improve to within specified parameters  Description: Ability to maintain appropriate glucose levels will improve to within specified parameters  Outcome: Ongoing  Note: Blood glucose check q6h. Insulin per sliding scale for coverage. Problem: Skin Integrity - Impaired:  Goal: Will show no infection signs and symptoms  Description: Will show no infection signs and symptoms  Outcome: Ongoing  Note: Scattered bruising. Surgical incision to midline abd. Staples intact, dry dressing intact with no drainage. Goal: Absence of new skin breakdown  Description: Absence of new skin breakdown  Outcome: Ongoing  Note: Turning patient q2h and PRN, pillows for support. No new skin breakdown noted. Problem: Sleep Pattern Disturbance:  Goal: Appears well-rested  Description: Appears well-rested  Outcome: Ongoing  Note: Patient resting well this shift. Problem: Tissue Perfusion, Altered:  Goal: Circulatory function within specified parameters  Description: Circulatory function within specified parameters  Outcome: Ongoing  Note: Pulses present. 2L NC.       Problem: Tissue Perfusion - Cardiopulmonary, Altered:  Goal: Absence of angina  Description: Absence of angina  Outcome: Ongoing  Note: Denies angina. Problem: Infection - Central Venous Catheter-Associated Bloodstream Infection:  Goal: Will show no infection signs and symptoms  Description: Will show no infection signs and symptoms  Outcome: Ongoing  Note: No redness or swelling at CVC site. Surgical site with dry dressing, staples. Problem: Infection - Surgical Site:  Goal: Will show no infection signs and symptoms  Description: Will show no infection signs and symptoms  Outcome: Ongoing  Note: No redness or swelling at CVC site. Surgical site with dry dressing, staples. Problem: Urinary Elimination:  Goal: Signs and symptoms of infection will decrease  Description: Signs and symptoms of infection will decrease  Outcome: Ongoing  Note: Bhat catheter in place draining clear yellow urine. Bhat care completed this shift. Problem: Skin Integrity:  Goal: Will show no infection signs and symptoms  Description: Will show no infection signs and symptoms  Outcome: Ongoing  Note: No redness or swelling at CVC site. Surgical site with dry dressing, staples. Goal: Absence of new skin breakdown  Description: Absence of new skin breakdown  Outcome: Ongoing  Note: No new skin breakdown noted. Turning patient q2h and PRN. Care plan reviewed with patient. Patient verbalized understanding of the plan of care and contribute to goal setting.

## 2020-08-15 NOTE — PROGRESS NOTES
Gyn Progress Note    SUBJECTIVE:  Post op Day# 7 S/P Exploratory laparotomy, RSO, washout  Pt states pain is controlled, she is resting comfortably in bed with nasal canula in place. She denies SOB. She denies nausea and is tolerating small amounts of fluids. RN states she had to really encourage her to take PO intake today. No BM, She states she has passes some flatus    OBJECTIVE:    VITALS:  BP (!) 142/66   Pulse 92   Temp 99.1 °F (37.3 °C) (Oral)   Resp 24   Ht 5' 8\" (1.727 m)   Wt 221 lb 14.4 oz (100.7 kg)   SpO2 95%   BMI 33.74 kg/m²     Intake/Output Summary (Last 24 hours) at 8/15/2020 1152  Last data filed at 8/15/2020 0358  Gross per 24 hour   Intake 2625.8 ml   Output 1425 ml   Net 1200.8 ml       Physical Exam  CONSTITUTIONAL:  awake, alert, cooperative, no apparent distress, and appears stated age  ABDOMEN:  Softly distended, + bowel sounds, Incision with clean dressing in place. Packing in open areas. DATA:  CBC   Lab Results   Component Value Date    WBC 12.3 (H) 08/15/2020    HGB 8.9 (L) 08/15/2020    HCT 30.3 (L) 08/15/2020     08/15/2020        ASSESSMENT AND PLAN:  POD #7  - ID: WBC improved today. Continue IV antibiotics. - GI: Ileus continues, she is tolerating some clears. TPN started yesterday for nutritional support  - Resp: she did not end up needing Bipap/Cipap support per the RN yesterday after transfer. She is breathing comfortably on nasal canula  - Activity- PT ordered but did not see her yesterday. She will need to increase activity as tolerated by her resp status.    - Appreciate medicine and gen surg support    Christy Allen MD

## 2020-08-15 NOTE — PROGRESS NOTES
TPN Follow Up Note    Assessment: post-op ileus. Tolerating Cl Liq diet per RN note  Electrolyte Replacement:   NaPhos 15 mmol  Ca 1gm    TPN changes for (today) at 1800: Increase NaPhos to 18mmol  Decrease KCl to 60mEq  Increase Ca to 9.3mEq    Re-check BMP, Mg, PO4, iCa in am       Ronen Cunningham Aas, BCPS, BCCCP 8/15/2020 7:41 AM

## 2020-08-15 NOTE — PLAN OF CARE
Problem: Airway Clearance - Ineffective:  Goal: Ability to maintain a clear airway will improve  Description: Ability to maintain a clear airway will improve  8/15/2020 0809 by [facility]  Outcome: Ongoing

## 2020-08-15 NOTE — PROGRESS NOTES
MD GISELLA Aguilar DR GENERAL SURGERY  General Surgery Postoperative Progress Note    Pt Name: Lev Orta  Medical Record Number: [id number]  Date of Birth 1960   Today's Date: 8/15/2020  ASSESSMENT   1. POD #  7 ex lap, abdominal washout , left salpingo-oophorectomy wit adnexal abscess excision performed by Dr. Angie Be with gynecology  2. Sepsis shock  present at time of admission- resolving  3. Purulent peritonitis present at time of surgery  4. Left adnexal abscess - path :Ovarian cyst, right, resection:    Cyst formation with abscess and inflamed partly ciliated epithelium.       Ovary with hemorrhage. has a past medical history of Allergic rhinitis, Chronic back pain, Headache(784.0), and Panlobular emphysema ([address] PLAN     1. Cefipime  And Flagyl approprieate coverage for culture positive for Fuosbacertim Nucleatum   2. NPO on TPN, start clears   3. Continues to have moderate amount of drainage from midline incision, change packing daily and change dressing when saturated  4.  Aggressive pulmonary toilet  SUBJECTIVE   Garlon Ar is passing some flatus, overall feels better today   CURRENT MEDICATIONS   Scheduled Meds:   sodium phosphate IVPB  15 mmol Intravenous Once    phosphorus replacement protocol   Other RX Placeholder    magnesium replacement protocol   Other RX Placeholder    lidocaine 1 % injection  5 mL Intradermal Once    ipratropium-albuterol  1 ampule Inhalation Q4H WA    insulin lispro  0-6 Units Subcutaneous Q6H    ketorolac  15 mg Intravenous Q6H    famotidine (PEPCID) injection  20 mg Intravenous BID    calcium replacement protocol   Other RX Placeholder    sodium chloride flush  10 mL Intravenous 2 times per day    enoxaparin  40 mg Subcutaneous Daily    cefepime  2 g Intravenous Q12H    metroNIDAZOLE  500 mg Intravenous Q8H     Continuous Infusions:   PN-Adult  3 IN 1 Central Line (Custom)      PN-Adult  3 IN 1 Central Line (Custom) 75 mL/hr at 08/14/20 1840    dextrose       PRN Meds:.albuterol sulfate HFA, morphine **OR** morphine, bisacodyl, [Held by provider] HYDROcodone-acetaminophen, fentanNYL, acetaminophen **OR** acetaminophen, potassium chloride, potassium chloride, potassium chloride **OR** potassium alternative oral replacement **OR** potassium chloride, glucose, dextrose, glucagon (rDNA), dextrose, sodium chloride flush, promethazine **OR** ondansetron  OBJECTIVE   CURRENT VITALS:  height is 5' 8\" (1.727 m) and weight is 221 lb 14.4 oz (100.7 kg). Her oral temperature is 98.9 °F (37.2 °C). Her blood pressure is 152/65 (abnormal) and her pulse is 91. Her respiration is 19 and oxygen saturation is 97%. Temperature Range (24h):Temp: 98.9 °F (37.2 °C) Temp  Av.6 °F (37 °C)  Min: 97.8 °F (36.6 °C)  Max: 99.2 °F (37.3 °C)  BP Range (19B): Systolic (88REK), PVE:804 , Min:123 , HSS:309     Diastolic (25FIO), REV:23, Min:58, Max:77    Pulse Range (24h): Pulse  Av.1  Min: 84  Max: 93  Respiration Range (24h): Resp  Av.6  Min: 15  Max: 23  Current Pulse Ox (24h):  SpO2: 97 %  Pulse Ox Range (24h):  SpO2  Av.6 %  Min: 89 %  Max: 99 %  Oxygen Amount and Delivery: O2 Flow Rate (L/min): 2 L/min  Incentive Spirometry Tx: Respiratory Effort: Dyspnea with Exertion Treatment Tolerance: (encouraged with cough/deep breathing) Incentive Spirometry Goal (mL): 1000 mL Incentive Spirometry Achieved (mL): 100 mL     interactive,NAD  Abdomen is soft nondistended. Incision is clean, it is draining some serosanguineous fluid, dressing is saturated. .  Packing is in place. No erythema or induration   Moves all extremities, no peripheral edema     In: 1450.8 [P.O.:150; I.V.:488]  Out: 725 [Urine:725]     Date 08/15/20 0000 - 08/15/20 4739   Shift  5222-4478 3453-9005 24 Hour Total   INTAKE   P.O.(mL/kg/hr) 50(0.1)   50   I. V.(mL/kg) 200(2)   200(2)   TPN(mL/kg) 573.8(5.7)   573.8(5.7)   Shift Total(mL/kg) 823.8(8.2)   823.8(8.2)   OUTPUT Urine(mL/kg/hr) 325(0.4)   325   Shift Total(mL/kg) 325(3.2)   325(3.2)   Weight (kg) 100.7 100.7 100.7 100.7     LABS     Recent Labs     08/13/20  0514 08/14/20  0415 08/15/20  0414   WBC 15.1* 17.5* 12.3*   HGB 10.2* 10.0* 8.9*   HCT 34.7* 33.3* 30.3*    335 313    143 140   K 4.1 3.9 4.3    101 105   CO2 37* 36* 31   BUN 40* 38* 34*   CREATININE 0.5 0.5 0.4   MG  --  2.2 2.1   PHOS 2.1* 2.7 2.1*   CALCIUM 7.8* 7.6* 7.2*      No results for input(s): PTT, INR in the last 72 hours. Invalid input(s): PT  No results for input(s): AST, ALT, BILITOT, BILIDIR, AMYLASE, LIPASE, LDH, LACTA in the last 72 hours. No results for input(s): TROPONINT in the last 72 hours. RADIOLOGY     XR CHEST (2 VW)   Final Result   1. The right PICC is unchanged in position with the distal tip overlying the superior vena cava. 2. There are small bilateral pleural effusions. There is additional opacity at both lung bases which most commonly represents atelectasis and/or infiltrates. There is no pulmonary vascular congestion. **This report has been created using voice recognition software. It may contain minor errors which are inherent in voice recognition technology. **      Final report electronically signed by Dr. Jose Eduardo Petersen on 8/15/2020 9:08 AM      XR CHEST PORTABLE   Final Result   Minimal opacity in the left lung base possible atelectasis, minimal infiltrate or effusion. **This report has been created using voice recognition software. It may contain minor errors which are inherent in voice recognition technology. **      Final report electronically signed by Dr. Cari Gilmore on 8/13/2020 2:13 PM      XR ABDOMEN (KUB) (SINGLE AP VIEW)   Final Result   Increased gaseous distention throughout the small bowel loops. This is likely related to postsurgical ileus although a bowel obstruction can't be excluded. **This report has been created using voice recognition software.  It may contain minor errors which are inherent in voice recognition technology. **      Final report electronically signed by Dr Michael Messer on 8/12/2020 9:16 AM      XR CHEST PORTABLE   Final Result      Improved lateral left basilar infiltrate/atelectasis/small left pleural effusion. Mild atelectasis at the medial right lung base. Possible left hilar adenopathy which appears to be mildly improved. **This report has been created using voice recognition software. It may contain minor errors which are inherent in voice recognition technology. **      Final report electronically signed by Dr. Alda Hare on 8/10/2020 5:19 AM      XR ABDOMEN (KUB) (SINGLE AP VIEW)   Final Result      Non obstructive bowel gas pattern. No evidence of an ileus. **This report has been created using voice recognition software. It may contain minor errors which are inherent in voice recognition technology. **      Final report electronically signed by Dr. Alda Hare on 8/10/2020 3:26 AM      XR CHEST PORTABLE   Final Result   ET node G-tube positions as above. Left lower lobe atelectasis or infiltrate. **This report has been created using voice recognition software. It may contain minor errors which are inherent in voice recognition technology. **      Final report electronically signed by Dr. Willis Santos on 8/8/2020 9:54 AM      XR CHEST PORTABLE   Final Result      Interval placement of a right jugular central venous catheter, tip in the mid SVC, no pneumothorax. New opacity at the lateral left lung base which may represent a small infiltrate, atelectasis or small left pleural effusion. Interstitial prominence, acute versus chronic. See discussion above and correlate clinically. **This report has been created using voice recognition software. It may contain minor errors which are inherent in voice recognition technology. **      Final report electronically signed by Dr. Alda Hare on 8/8/2020 5:53 AM      XR CHEST 1 VIEW   Final Result   No confluent infiltrate. PA and lateral radiographs could be performed if indicated clinically. **This report has been created using voice recognition software. It may contain minor errors which are inherent in voice recognition technology. **      Final report electronically signed by Dr. Larisa Valverde on 8/7/2020 1:30 PM      US NON OB TRANSVAGINAL   Final Result      Large complex lesion in the left ovary could relate to abscess or hemorrhagic neoplasm either benign or malignant. Lack of significant ascites mitigates against malignancy. **This report has been created using voice recognition software. It may contain minor errors which are inherent in voice recognition technology. **      Final report electronically signed by Dr. Larisa Valverde on 8/7/2020 1:44 PM      US DUP ABD PEL RETRO SCROT COMPLETE   Final Result      Large complex lesion in the left ovary could relate to abscess or hemorrhagic neoplasm either benign or malignant. Lack of significant ascites mitigates against malignancy. **This report has been created using voice recognition software. It may contain minor errors which are inherent in voice recognition technology. **      Final report electronically signed by Dr. Larisa Valverde on 8/7/2020 1:44 PM      CT ABDOMEN PELVIS W IV CONTRAST Additional Contrast? None   Final Result   Lingular pneumonia. Cholelithiasis. Large pelvic mass which is mildly inflamed likely relating to the left ovary. Benign and malignant etiologies are possible. **This report has been created using voice recognition software. It may contain minor errors which are inherent in voice recognition technology. **      Final report electronically signed by Dr. Larisa Valverde on 8/7/2020 1:23 PM          Electronically signed by Patrick White MD on 8/15/2020 at 10:57 AM

## 2020-08-16 LAB
ANION GAP SERPL CALCULATED.3IONS-SCNC: 3 MEQ/L (ref 8–16)
BUN BLDV-MCNC: 27 MG/DL (ref 7–22)
CALCIUM IONIZED: 1.03 MMOL/L (ref 1.12–1.32)
CALCIUM SERPL-MCNC: 7.4 MG/DL (ref 8.5–10.5)
CHLORIDE BLD-SCNC: 103 MEQ/L (ref 98–111)
CO2: 29 MEQ/L (ref 23–33)
CREAT SERPL-MCNC: 0.3 MG/DL (ref 0.4–1.2)
GFR SERPL CREATININE-BSD FRML MDRD: > 90 ML/MIN/1.73M2
GLUCOSE BLD-MCNC: 146 MG/DL (ref 70–108)
GLUCOSE BLD-MCNC: 147 MG/DL (ref 70–108)
GLUCOSE BLD-MCNC: 151 MG/DL (ref 70–108)
GLUCOSE BLD-MCNC: 155 MG/DL (ref 70–108)
GLUCOSE BLD-MCNC: 178 MG/DL (ref 70–108)
MAGNESIUM: 2 MG/DL (ref 1.6–2.4)
PHOSPHORUS: 2.2 MG/DL (ref 2.4–4.7)
POTASSIUM SERPL-SCNC: 4.6 MEQ/L (ref 3.5–5.2)
SODIUM BLD-SCNC: 135 MEQ/L (ref 135–145)

## 2020-08-16 PROCEDURE — 6360000002 HC RX W HCPCS: Performed by: OBSTETRICS & GYNECOLOGY

## 2020-08-16 PROCEDURE — 2500000003 HC RX 250 WO HCPCS: Performed by: PHARMACIST

## 2020-08-16 PROCEDURE — 2500000003 HC RX 250 WO HCPCS: Performed by: OBSTETRICS & GYNECOLOGY

## 2020-08-16 PROCEDURE — 84100 ASSAY OF PHOSPHORUS: CPT

## 2020-08-16 PROCEDURE — 2580000003 HC RX 258: Performed by: OBSTETRICS & GYNECOLOGY

## 2020-08-16 PROCEDURE — 2580000003 HC RX 258: Performed by: PHARMACIST

## 2020-08-16 PROCEDURE — 6360000002 HC RX W HCPCS: Performed by: INTERNAL MEDICINE

## 2020-08-16 PROCEDURE — 99233 SBSQ HOSP IP/OBS HIGH 50: CPT | Performed by: INTERNAL MEDICINE

## 2020-08-16 PROCEDURE — 94669 MECHANICAL CHEST WALL OSCILL: CPT

## 2020-08-16 PROCEDURE — 36591 DRAW BLOOD OFF VENOUS DEVICE: CPT

## 2020-08-16 PROCEDURE — 80048 BASIC METABOLIC PNL TOTAL CA: CPT

## 2020-08-16 PROCEDURE — 83735 ASSAY OF MAGNESIUM: CPT

## 2020-08-16 PROCEDURE — 2140000000 HC CCU INTERMEDIATE R&B

## 2020-08-16 PROCEDURE — 94640 AIRWAY INHALATION TREATMENT: CPT

## 2020-08-16 PROCEDURE — 6370000000 HC RX 637 (ALT 250 FOR IP): Performed by: INTERNAL MEDICINE

## 2020-08-16 PROCEDURE — 82330 ASSAY OF CALCIUM: CPT

## 2020-08-16 PROCEDURE — 82948 REAGENT STRIP/BLOOD GLUCOSE: CPT

## 2020-08-16 PROCEDURE — 99232 SBSQ HOSP IP/OBS MODERATE 35: CPT | Performed by: INTERNAL MEDICINE

## 2020-08-16 PROCEDURE — 6360000002 HC RX W HCPCS: Performed by: PHARMACIST

## 2020-08-16 PROCEDURE — 2700000000 HC OXYGEN THERAPY PER DAY

## 2020-08-16 PROCEDURE — 94761 N-INVAS EAR/PLS OXIMETRY MLT: CPT

## 2020-08-16 RX ORDER — FUROSEMIDE 10 MG/ML
40 INJECTION INTRAMUSCULAR; INTRAVENOUS 2 TIMES DAILY
Status: DISCONTINUED | OUTPATIENT
Start: 2020-08-16 | End: 2020-08-18

## 2020-08-16 RX ORDER — POLYETHYLENE GLYCOL 3350 17 G/17G
17 POWDER, FOR SOLUTION ORAL DAILY
Status: DISCONTINUED | OUTPATIENT
Start: 2020-08-16 | End: 2020-08-18 | Stop reason: HOSPADM

## 2020-08-16 RX ORDER — FAMOTIDINE 20 MG/1
20 TABLET, FILM COATED ORAL DAILY
Status: DISCONTINUED | OUTPATIENT
Start: 2020-08-16 | End: 2020-08-17

## 2020-08-16 RX ORDER — METOCLOPRAMIDE HYDROCHLORIDE 5 MG/ML
10 INJECTION INTRAMUSCULAR; INTRAVENOUS EVERY 12 HOURS
Status: DISCONTINUED | OUTPATIENT
Start: 2020-08-16 | End: 2020-08-18 | Stop reason: HOSPADM

## 2020-08-16 RX ADMIN — IPRATROPIUM BROMIDE AND ALBUTEROL SULFATE 1 AMPULE: .5; 3 SOLUTION RESPIRATORY (INHALATION) at 22:17

## 2020-08-16 RX ADMIN — SODIUM CHLORIDE, PRESERVATIVE FREE 10 ML: 5 INJECTION INTRAVENOUS at 09:44

## 2020-08-16 RX ADMIN — FUROSEMIDE 40 MG: 10 INJECTION, SOLUTION INTRAMUSCULAR; INTRAVENOUS at 09:42

## 2020-08-16 RX ADMIN — KETOROLAC TROMETHAMINE 15 MG: 30 INJECTION, SOLUTION INTRAMUSCULAR at 15:37

## 2020-08-16 RX ADMIN — METRONIDAZOLE 500 MG: 500 INJECTION, SOLUTION INTRAVENOUS at 18:27

## 2020-08-16 RX ADMIN — FUROSEMIDE 40 MG: 10 INJECTION, SOLUTION INTRAMUSCULAR; INTRAVENOUS at 17:21

## 2020-08-16 RX ADMIN — KETOROLAC TROMETHAMINE 15 MG: 30 INJECTION, SOLUTION INTRAMUSCULAR at 01:32

## 2020-08-16 RX ADMIN — CALCIUM GLUCONATE 2 G: 98 INJECTION, SOLUTION INTRAVENOUS at 16:03

## 2020-08-16 RX ADMIN — ENOXAPARIN SODIUM 40 MG: 40 INJECTION SUBCUTANEOUS at 20:35

## 2020-08-16 RX ADMIN — POLYETHYLENE GLYCOL 3350 17 G: 17 POWDER, FOR SOLUTION ORAL at 09:44

## 2020-08-16 RX ADMIN — ONDANSETRON 4 MG: 2 INJECTION INTRAMUSCULAR; INTRAVENOUS at 10:04

## 2020-08-16 RX ADMIN — DILTIAZEM HYDROCHLORIDE 30 MG: 30 TABLET, FILM COATED ORAL at 16:04

## 2020-08-16 RX ADMIN — KETOROLAC TROMETHAMINE 15 MG: 30 INJECTION, SOLUTION INTRAMUSCULAR at 09:37

## 2020-08-16 RX ADMIN — KETOROLAC TROMETHAMINE 15 MG: 30 INJECTION, SOLUTION INTRAMUSCULAR at 22:12

## 2020-08-16 RX ADMIN — CEFEPIME 2 G: 2 INJECTION, POWDER, FOR SOLUTION INTRAMUSCULAR; INTRAVENOUS at 01:32

## 2020-08-16 RX ADMIN — FAMOTIDINE 20 MG: 20 TABLET, FILM COATED ORAL at 09:43

## 2020-08-16 RX ADMIN — METRONIDAZOLE 500 MG: 500 INJECTION, SOLUTION INTRAVENOUS at 09:43

## 2020-08-16 RX ADMIN — ONDANSETRON 4 MG: 2 INJECTION INTRAMUSCULAR; INTRAVENOUS at 15:37

## 2020-08-16 RX ADMIN — SODIUM PHOSPHATE, MONOBASIC, MONOHYDRATE 15 MMOL: 276; 142 INJECTION, SOLUTION INTRAVENOUS at 11:46

## 2020-08-16 RX ADMIN — IPRATROPIUM BROMIDE AND ALBUTEROL SULFATE 1 AMPULE: .5; 3 SOLUTION RESPIRATORY (INHALATION) at 14:07

## 2020-08-16 RX ADMIN — DILTIAZEM HYDROCHLORIDE 30 MG: 30 TABLET, FILM COATED ORAL at 22:12

## 2020-08-16 RX ADMIN — IPRATROPIUM BROMIDE AND ALBUTEROL SULFATE 1 AMPULE: .5; 3 SOLUTION RESPIRATORY (INHALATION) at 10:40

## 2020-08-16 RX ADMIN — METOCLOPRAMIDE 10 MG: 5 INJECTION, SOLUTION INTRAMUSCULAR; INTRAVENOUS at 09:43

## 2020-08-16 RX ADMIN — IPRATROPIUM BROMIDE AND ALBUTEROL SULFATE 1 AMPULE: .5; 3 SOLUTION RESPIRATORY (INHALATION) at 17:39

## 2020-08-16 RX ADMIN — CALCIUM GLUCONATE: 98 INJECTION, SOLUTION INTRAVENOUS at 20:36

## 2020-08-16 RX ADMIN — METOCLOPRAMIDE 10 MG: 5 INJECTION, SOLUTION INTRAMUSCULAR; INTRAVENOUS at 18:27

## 2020-08-16 RX ADMIN — CEFEPIME 2 G: 2 INJECTION, POWDER, FOR SOLUTION INTRAMUSCULAR; INTRAVENOUS at 17:21

## 2020-08-16 ASSESSMENT — PAIN DESCRIPTION - LOCATION
LOCATION: ABDOMEN

## 2020-08-16 ASSESSMENT — PAIN DESCRIPTION - FREQUENCY
FREQUENCY: INTERMITTENT

## 2020-08-16 ASSESSMENT — PAIN - FUNCTIONAL ASSESSMENT
PAIN_FUNCTIONAL_ASSESSMENT: PREVENTS OR INTERFERES SOME ACTIVE ACTIVITIES AND ADLS
PAIN_FUNCTIONAL_ASSESSMENT: ACTIVITIES ARE NOT PREVENTED
PAIN_FUNCTIONAL_ASSESSMENT: ACTIVITIES ARE NOT PREVENTED
PAIN_FUNCTIONAL_ASSESSMENT: PREVENTS OR INTERFERES SOME ACTIVE ACTIVITIES AND ADLS

## 2020-08-16 ASSESSMENT — PAIN DESCRIPTION - PAIN TYPE
TYPE: SURGICAL PAIN
TYPE: ACUTE PAIN
TYPE: SURGICAL PAIN

## 2020-08-16 ASSESSMENT — PAIN SCALES - GENERAL
PAINLEVEL_OUTOF10: 3
PAINLEVEL_OUTOF10: 0
PAINLEVEL_OUTOF10: 0
PAINLEVEL_OUTOF10: 8
PAINLEVEL_OUTOF10: 9
PAINLEVEL_OUTOF10: 0
PAINLEVEL_OUTOF10: 2
PAINLEVEL_OUTOF10: 0
PAINLEVEL_OUTOF10: 5
PAINLEVEL_OUTOF10: 0

## 2020-08-16 ASSESSMENT — PAIN DESCRIPTION - PROGRESSION
CLINICAL_PROGRESSION: GRADUALLY WORSENING
CLINICAL_PROGRESSION: RESOLVED

## 2020-08-16 ASSESSMENT — PAIN DESCRIPTION - ONSET
ONSET: GRADUAL

## 2020-08-16 ASSESSMENT — PAIN DESCRIPTION - ORIENTATION
ORIENTATION: RIGHT;LEFT;ANTERIOR
ORIENTATION: RIGHT;LEFT
ORIENTATION: RIGHT;LEFT;ANTERIOR

## 2020-08-16 ASSESSMENT — PAIN DESCRIPTION - DESCRIPTORS
DESCRIPTORS: DISCOMFORT

## 2020-08-16 NOTE — PLAN OF CARE
Problem: Impaired respiratory status  Goal: Clear lung sounds  8/16/2020 1745 by Juliana [facility]  Outcome: Ongoing

## 2020-08-16 NOTE — PLAN OF CARE
Problem: Pain:  Goal: Pain level will decrease  Description: Pain level will decrease  Note: Scheduled Toradol given per [facility] for surgical abdominal pain. Effective. Pain goal 0/10. Repositioning, resting and splinting for comfort. Problem: Nutrition  Goal: Optimal nutrition therapy  Outcome: Ongoing  Note: Patient tolerating clear liquid diet. Active bowel sounds. Problem: Musculor/Skeletal Functional Status  Goal: Highest potential functional level  Outcome: Ongoing  Note: PT working with patient. Problem: Discharge Planning:  Goal: Participates in care planning  Description: Participates in care planning  Outcome: Ongoing  Note: Patient alert and oriented, participated in the [address]. Goal: Discharged to appropriate level of care  Description: Discharged to appropriate level of care  Outcome: Ongoing  Note: Discharge planning in process. Problem: Airway Clearance - Ineffective:  Goal: Ability to maintain a clear airway will improve  Description: Ability to maintain a clear airway will improve  Outcome: Ongoing  Note: Patient able to cough to clear airway. Patient on 2L NC. Problem: Anxiety/Stress:  Goal: Level of anxiety will decrease  Description: Level of anxiety will decrease  Outcome: Ongoing  Note: No anxiety this shift. Problem: Aspiration:  Goal: Absence of aspiration  Description: Absence of aspiration  Outcome: Ongoing  Note: HOB elevated with eating and drinking. Problem: Bowel Function - Altered:  Goal: Bowel elimination is within specified parameters  Description: Bowel elimination is within specified parameters  Outcome: Ongoing  Note: NO BM this shift. Flatus noted. Problem: Cardiac Output - Decreased:  Goal: Hemodynamic stability will improve  Description: Hemodynamic stability will improve  Outcome: Ongoing  Note: VS stable. NSR.       Problem: Fluid Volume - Imbalance:  Goal: Absence of imbalanced fluid volume signs and symptoms  Description: Absence of imbalanced fluid volume signs and symptoms  Outcome: Ongoing  Note: LABS in AM. Generalized edema. Problem: Gas Exchange - Impaired:  Goal: Levels of oxygenation will improve  Description: Levels of oxygenation will improve  Outcome: Ongoing  Note: 2L NC, O2 saturation > 92%. Problem: Pain:  Goal: Pain level will decrease  Description: Pain level will decrease  Note: Scheduled Toradol given per [facility] for surgical abdominal pain. Effective. Pain goal 0/10. Repositioning, resting and splinting for comfort. Problem: Serum Glucose Level - Abnormal:  Goal: Ability to maintain appropriate glucose levels will improve to within specified parameters  Description: Ability to maintain appropriate glucose levels will improve to within specified parameters  Outcome: Ongoing  Note: Blood glucose checks q6h. Insulin per sliding scale for coverage. No coverage given this shift. Problem: Skin Integrity - Impaired:  Goal: Will show no infection signs and symptoms  Description: Will show no infection signs and symptoms  Outcome: Ongoing  Goal: Absence of new skin breakdown  Description: Absence of new skin breakdown  Outcome: Ongoing     Problem: Sleep Pattern Disturbance:  Goal: Appears well-rested  Description: Appears well-rested  Outcome: Ongoing  Note: Patient rested well this shift. Problem: Tissue Perfusion, Altered:  Goal: Circulatory function within specified parameters  Description: Circulatory function within specified parameters  Outcome: Ongoing  Note: Continuous telemetry monitoring. O2 via 2L NC. Problem: Tissue Perfusion - Cardiopulmonary, Altered:  Goal: Absence of angina  Description: Absence of angina  Outcome: Ongoing  Note: Denies angina. Goal: Hemodynamic stability will improve  Description: Hemodynamic stability will improve  Outcome: Ongoing  Note: VS stable.       Problem: Infection - Central Venous Catheter-Associated Bloodstream Infection:  Goal: Will show no infection signs and symptoms  Description: Will show no infection signs and symptoms  Outcome: Ongoing  Note: PICC to right arm, ports patent with no redness or swelling at site. Surgical incision to abdomen with staples. Dry dressing intact with no drainage. Scattered bruising. Problem: Infection - Surgical Site:  Goal: Will show no infection signs and symptoms  Description: Will show no infection signs and symptoms  Outcome: Ongoing  Note: PICC to right arm, ports patent with no redness or swelling at site. Surgical incision to abdomen with staples. Dry dressing intact with no drainage. Scattered bruising. Problem: Urinary Elimination:  Goal: Signs and symptoms of infection will decrease  Description: Signs and symptoms of infection will decrease  Outcome: Ongoing     Problem: Skin Integrity:  Goal: Will show no infection signs and symptoms  Description: Will show no infection signs and symptoms  Outcome: Ongoing  Note: PICC to right arm, ports patent with no redness or swelling at site. Surgical incision to abdomen with staples. Dry dressing intact with no drainage. Scattered bruising. Goal: Absence of new skin breakdown  Description: Absence of new skin breakdown  Outcome: Ongoing     Problem: Falls - Risk of:  Goal: Will remain free from falls  Description: Will remain free from falls  Outcome: Ongoing  Goal: Absence of physical injury  Description: Absence of physical injury  Outcome: Ongoing     Care plan reviewed with patient. Patient verbalized understanding of the plan of care and contribute to goal setting.

## 2020-08-16 NOTE — PROGRESS NOTES
Patient up to chair this afternoon with 2 person stand and pivot assist. Patient with gas but no bowel movement yet. Patient weaned down off of nasal cannula to room air this afternoon.

## 2020-08-16 NOTE — PLAN OF CARE
Problem: Pain:  Goal: Pain level will decrease  Description: Pain level will decrease  8/16/2020 1718 by Polina Velez RN  Outcome: Ongoing  Note: Continually monitoring patients surgical pain to abdomen. Patient is on scheduled toradol for pain control. Patient reports pain using 0-10 pain rating scale. Prior to toradol administration patient stated that the pain was a 9/10 to their abdomen, was worsening, and was not radiating anywhere and that it was sharp and constant. Problem: Nutrition  Goal: Optimal nutrition therapy  8/16/2020 1718 by Polina Velez RN  Outcome: Ongoing  Note: Patient receiving TPN at 75 ml/hr through their PICC. Patient on clear liquids at this time and not tolerating oral intake very well. Patient stated that the liquids do not taste good and make her stomach hurt very badly. Encouraging patient to increase their oral intake. Problem: Discharge Planning:  Goal: Participates in care planning  Description: Participates in care planning  8/16/2020 1718 by Polina Velez RN  Outcome: Ongoing  Note: Patient is from home with son and social work is working to determine placement. Possible Moshe evaluation. Problem: Airway Clearance - Ineffective:  Goal: Ability to maintain a clear airway will improve  Description: Ability to maintain a clear airway will improve  8/16/2020 1718 by Polnia Velez RN  Outcome: Ongoing  Note: Encouraging patient to cough and deep breath and to support stomach with pillow. IV Lasix 40 mg given BID. Problem: Bowel Function - Altered:  Goal: Bowel elimination is within specified parameters  Description: Bowel elimination is within specified parameters  8/16/2020 1718 by Polina Velez RN  Outcome: Ongoing  Note: Bowel sounds noted. No bowel movement thus far. Patient reports gas.       Problem: Cardiac Output - Decreased:  Goal: Hemodynamic stability will improve  Description: Hemodynamic stability will improve  8/16/2020 1718 by Polina Velez RN  Outcome: Ongoing  Note: Noted patient with more frequent runs of SVT. Notified hospitalist who started patient on PO cardizem four times a day. Monitoring heart rate and rhythm continually through bedside cardiac monitoring. Problem: Gas Exchange - Impaired:  Goal: Levels of oxygenation will improve  Description: Levels of oxygenation will improve  8/16/2020 1718 by Jana Bear RN  Outcome: Ongoing  Note: Weaned patient to room air. Patient tolerated this well. Encouraging patient to cough and deep breath with pillow support to abdominal dressing. Problem: Pain:  Goal: Pain level will decrease  Description: Pain level will decrease  8/16/2020 1718 by Jana Bear RN  Outcome: Ongoing  Note: Continually monitoring patients surgical pain to abdomen. Patient is on scheduled toradol for pain control. Patient reports pain using 0-10 pain rating scale. Prior to toradol administration patient stated that the pain was a 9/10 to their abdomen, was worsening, and was not radiating anywhere and that it was sharp and constant. Problem: Serum Glucose Level - Abnormal:  Goal: Ability to maintain appropriate glucose levels will improve to within specified parameters  Description: Ability to maintain appropriate glucose levels will improve to within specified parameters  8/16/2020 1718 by Jana Bear RN  Outcome: Ongoing  Note: Checking blood glucose every 6 hours and replacing with sliding scale insulin coverage as ordered. Problem: Skin Integrity - Impaired:  Goal: Will show no infection signs and symptoms  Description: Will show no infection signs and symptoms  8/16/2020 1718 by Jana Bear RN  Outcome: Ongoing  Note: Ongoing assessment & interventions provided throughout shift. Skin assessments provided. Encouraging/assisting patient to turn as needed. Patient up to the chair this afternoon with chair cushion to bottom.       Problem: Urinary Elimination:  Goal: Signs and symptoms of infection will decrease  Description: Signs and symptoms of infection will decrease  8/16/2020 1718 by Beauford Duverney, RN  Outcome: Ongoing  Note: Monitoring urine output and for signs and symptoms of UTI due to urinary catheter. Care plan reviewed with patient. Patient verbalizes understanding of the care plan and contributed to goal setting.

## 2020-08-16 NOTE — PROGRESS NOTES
Assessment and Plan:        1. Ovarian abscess- s/p OR;  Starting clear liquids. Passing gas. No bm  2. Septic shock- resolved  3. Pos fluid balance- slow IV fluids, diuretic  4. Ileus-  Resolved. On TPN due to poor nutrition  5. elev CO2;  Pulmonary consulted and following. cpap at night. Her resp acidosis is compensated. She did not wear cpap pm 8.15  6. Anticipate long stay- ? [place]  7. Day 8 of antibiotics- will consider stop if wbc continues to fall. 8.  still pos fluid balance; will boost diuretic        CC:  Abdominal pain, fever  HPI: pt in prior good health, presented with above. Ct showed ovarian mass. Went to OR. Has copd. ROS (12 point review of systems completed. Pertinent positives noted. Otherwise ROS is negative) : neg  PMH:  Per HPI.   copd  SHX:  Single, former smoker  FHX: diabetes  Allergies: See above    Medications:     PN-Adult  3 IN 1 Central Line (Custom) 75 mL/hr at 08/15/20 1812    dextrose        furosemide  40 mg Intravenous BID    famotidine  20 mg Oral Daily    polyethylene glycol  17 g Oral Daily    metoclopramide  10 mg Intravenous Q12H    phosphorus replacement protocol   Other RX Placeholder    magnesium replacement protocol   Other RX Placeholder    lidocaine 1 % injection  5 mL Intradermal Once    ipratropium-albuterol  1 ampule Inhalation Q4H WA    insulin lispro  0-6 Units Subcutaneous Q6H    ketorolac  15 mg Intravenous Q6H    calcium replacement protocol   Other RX Placeholder    sodium chloride flush  10 mL Intravenous 2 times per day    enoxaparin  40 mg Subcutaneous Daily    cefepime  2 g Intravenous Q12H    metroNIDAZOLE  500 mg Intravenous Q8H       Vital Signs:   BP (!) 145/70   Pulse 101   Temp 97.8 °F (36.6 °C) (Oral)   Resp 23   Ht 5' 8\" (1.727 m)   Wt 221 lb 14.4 oz (100.7 kg)   SpO2 97%   BMI 33.74 kg/m²      Intake/Output Summary (Last 24 hours) at 8/16/2020 2193  Last data filed at 8/16/2020 0406  Gross per 24 hour   Intake 3956.96 ml   Output 1590 ml   Net 2366.96 ml        Physical Examination: General appearance - chronically ill appearing  Mental status - alert, oriented to person, place, and time  Neck - supple, no significant adenopathy, no JVD, or carotid bruits  Chest - Breath sounds diminished bilaterally. Heart - no JVD, tachycardic  Abdomen - distended, very rare faint bowel sounds  Neurological - alert, oriented, normal speech, no focal findings or movement disorder noted  Musculoskeletal - no muscular tenderness noted  Extremities - no pedal edema noted  Skin - normal coloration and turgor, no rashes, no suspicious skin lesions noted    Data: (All radiographs, tracings, PFTs, and imaging are personally viewed and interpreted unless otherwise noted).     Labs pending      Electronically signed by Alberta Marcial MD on 8/16/2020 at 6:27 AM

## 2020-08-16 NOTE — PROGRESS NOTES
Gyn Progress Note    SUBJECTIVE:  Post op Day# 8 S/P Ex lap, RSO, washout  She had some increased incisional pain after her breakfast this morning. She is still not having much of an appetite and is requiring a lot of encouragement to take PO. She denies N/V. + flatus but no BM. She has not been up out of bed    OBJECTIVE:    VITALS:  BP (!) 131/50   Pulse 95   Temp 98.9 °F (37.2 °C) (Oral)   Resp 22   Ht 5' 8\" (1.727 m)   Wt 221 lb 14.4 oz (100.7 kg)   SpO2 96%   BMI 33.74 kg/m²     Intake/Output Summary (Last 24 hours) at 8/16/2020 1227  Last data filed at 8/16/2020 1041  Gross per 24 hour   Intake 3676.96 ml   Output 1740 ml   Net 1936.96 ml       Physical Exam  CONSTITUTIONAL:  awake, alert, cooperative, no apparent distress, and appears stated age  ABDOMEN:  Softly distended, incision with staples in place. 5 open areas packed with mesalt packing. Replaced this morning with the RN. Pink serous drainage noted on dressing, it is healing well. DATA:  CBC   Lab Results   Component Value Date    WBC 12.3 (H) 08/15/2020    HGB 8.9 (L) 08/15/2020    HCT 30.3 (L) 08/15/2020     08/15/2020        ASSESSMENT AND PLAN:  POD#8  - Tolerating small amounts of clear liquids.  Consider increasing to soft diet tomorrow. + bowel sounds  - WBC improved yesterday continue IV antibiotics for TOA/peritoneal abscess  - Encouraged trying to increase activity as tolerated and working with Wyatt Thomas MD

## 2020-08-16 NOTE — PROGRESS NOTES
TPN Follow Up Note    Assessment: Starting Cl liq diet today  Electrolyte Replacement:   NaPhos 15 mmol  Ca 2gm    TPN changes for (today) at 1800: Increase NaCl 100mEq  Increase NaPhos 20mmol  Decrease KCl 50mEq    Re-check BMP, Mg, PO4, iCa in am      Ronen Escobedo, TRINITYS, BCCCP 8/16/2020 8:51 AM    Addendum:  Compounded TPN bag unavailable. Spoke with Dr Adin Glass. Agreed to Clinimix 4.25/5 with added electrolytes to run centrally @60ml/hr. (clinimix 5/15 has too much dextrose in it). Will change back to 3 in 1 TPN with tomorrow night's bag. Adrian Escobedo., BCPS, BCCCP 8/16/2020 12:52 PM

## 2020-08-17 LAB
ANION GAP SERPL CALCULATED.3IONS-SCNC: 6 MEQ/L (ref 8–16)
BASOPHILS # BLD: 0.3 %
BASOPHILS ABSOLUTE: 0.1 THOU/MM3 (ref 0–0.1)
BUN BLDV-MCNC: 26 MG/DL (ref 7–22)
CALCIUM IONIZED: 1.08 MMOL/L (ref 1.12–1.32)
CALCIUM SERPL-MCNC: 7.5 MG/DL (ref 8.5–10.5)
CHLORIDE BLD-SCNC: 98 MEQ/L (ref 98–111)
CO2: 28 MEQ/L (ref 23–33)
CREAT SERPL-MCNC: 0.3 MG/DL (ref 0.4–1.2)
EOSINOPHIL # BLD: 1.3 %
EOSINOPHILS ABSOLUTE: 0.2 THOU/MM3 (ref 0–0.4)
ERYTHROCYTE [DISTWIDTH] IN BLOOD BY AUTOMATED COUNT: 13.7 % (ref 11.5–14.5)
ERYTHROCYTE [DISTWIDTH] IN BLOOD BY AUTOMATED COUNT: 47.5 FL (ref 35–45)
GFR SERPL CREATININE-BSD FRML MDRD: > 90 ML/MIN/1.73M2
GLUCOSE BLD-MCNC: 134 MG/DL (ref 70–108)
GLUCOSE BLD-MCNC: 134 MG/DL (ref 70–108)
GLUCOSE BLD-MCNC: 135 MG/DL (ref 70–108)
GLUCOSE BLD-MCNC: 137 MG/DL (ref 70–108)
GLUCOSE BLD-MCNC: 142 MG/DL (ref 70–108)
HCT VFR BLD CALC: 31.5 % (ref 37–47)
HEMOGLOBIN: 9.6 GM/DL (ref 12–16)
IMMATURE GRANS (ABS): 0.66 THOU/MM3 (ref 0–0.07)
IMMATURE GRANULOCYTES: 3.5 %
LYMPHOCYTES # BLD: 6.9 %
LYMPHOCYTES ABSOLUTE: 1.3 THOU/MM3 (ref 1–4.8)
MAGNESIUM: 2.1 MG/DL (ref 1.6–2.4)
MCH RBC QN AUTO: 29.9 PG (ref 26–33)
MCHC RBC AUTO-ENTMCNC: 30.5 GM/DL (ref 32.2–35.5)
MCV RBC AUTO: 98.1 FL (ref 81–99)
MONOCYTES # BLD: 4.1 %
MONOCYTES ABSOLUTE: 0.8 THOU/MM3 (ref 0.4–1.3)
NUCLEATED RED BLOOD CELLS: 0 /100 WBC
PHOSPHORUS: 3.2 MG/DL (ref 2.4–4.7)
PLATELET # BLD: 427 THOU/MM3 (ref 130–400)
PLATELET ESTIMATE: ADEQUATE
PMV BLD AUTO: 10.9 FL (ref 9.4–12.4)
POTASSIUM SERPL-SCNC: 4.6 MEQ/L (ref 3.5–5.2)
PROCALCITONIN: 0.34 NG/ML (ref 0.01–0.09)
RBC # BLD: 3.21 MILL/MM3 (ref 4.2–5.4)
SCAN OF BLOOD SMEAR: NORMAL
SEG NEUTROPHILS: 83.9 %
SEGMENTED NEUTROPHILS ABSOLUTE COUNT: 15.6 THOU/MM3 (ref 1.8–7.7)
SODIUM BLD-SCNC: 132 MEQ/L (ref 135–145)
WBC # BLD: 18.6 THOU/MM3 (ref 4.8–10.8)

## 2020-08-17 PROCEDURE — 2580000003 HC RX 258: Performed by: OBSTETRICS & GYNECOLOGY

## 2020-08-17 PROCEDURE — 97116 GAIT TRAINING THERAPY: CPT

## 2020-08-17 PROCEDURE — 2500000003 HC RX 250 WO HCPCS: Performed by: OBSTETRICS & GYNECOLOGY

## 2020-08-17 PROCEDURE — 6370000000 HC RX 637 (ALT 250 FOR IP): Performed by: INTERNAL MEDICINE

## 2020-08-17 PROCEDURE — 82948 REAGENT STRIP/BLOOD GLUCOSE: CPT

## 2020-08-17 PROCEDURE — 84145 PROCALCITONIN (PCT): CPT

## 2020-08-17 PROCEDURE — 85025 COMPLETE CBC W/AUTO DIFF WBC: CPT

## 2020-08-17 PROCEDURE — 6360000002 HC RX W HCPCS: Performed by: OBSTETRICS & GYNECOLOGY

## 2020-08-17 PROCEDURE — 99232 SBSQ HOSP IP/OBS MODERATE 35: CPT | Performed by: INTERNAL MEDICINE

## 2020-08-17 PROCEDURE — 6370000000 HC RX 637 (ALT 250 FOR IP): Performed by: OBSTETRICS & GYNECOLOGY

## 2020-08-17 PROCEDURE — 82330 ASSAY OF CALCIUM: CPT

## 2020-08-17 PROCEDURE — 2580000003 HC RX 258: Performed by: NURSE PRACTITIONER

## 2020-08-17 PROCEDURE — 6360000002 HC RX W HCPCS: Performed by: INTERNAL MEDICINE

## 2020-08-17 PROCEDURE — 94761 N-INVAS EAR/PLS OXIMETRY MLT: CPT

## 2020-08-17 PROCEDURE — 97530 THERAPEUTIC ACTIVITIES: CPT

## 2020-08-17 PROCEDURE — 83735 ASSAY OF MAGNESIUM: CPT

## 2020-08-17 PROCEDURE — 2140000000 HC CCU INTERMEDIATE R&B

## 2020-08-17 PROCEDURE — 80048 BASIC METABOLIC PNL TOTAL CA: CPT

## 2020-08-17 PROCEDURE — 97110 THERAPEUTIC EXERCISES: CPT

## 2020-08-17 PROCEDURE — 2500000003 HC RX 250 WO HCPCS: Performed by: PHARMACIST

## 2020-08-17 PROCEDURE — 94669 MECHANICAL CHEST WALL OSCILL: CPT

## 2020-08-17 PROCEDURE — 6360000002 HC RX W HCPCS: Performed by: NURSE PRACTITIONER

## 2020-08-17 PROCEDURE — 84100 ASSAY OF PHOSPHORUS: CPT

## 2020-08-17 RX ORDER — TIOTROPIUM BROMIDE AND OLODATEROL 3.124; 2.736 UG/1; UG/1
2 SPRAY, METERED RESPIRATORY (INHALATION) DAILY
Qty: 1 INHALER | Refills: 3 | Status: SHIPPED | OUTPATIENT
Start: 2020-08-17 | End: 2020-09-16

## 2020-08-17 RX ORDER — FAMOTIDINE 20 MG/1
20 TABLET, FILM COATED ORAL 2 TIMES DAILY
Status: DISCONTINUED | OUTPATIENT
Start: 2020-08-17 | End: 2020-08-18

## 2020-08-17 RX ORDER — LACTOBACILLUS RHAMNOSUS GG 10B CELL
1 CAPSULE ORAL
Status: DISCONTINUED | OUTPATIENT
Start: 2020-08-17 | End: 2020-08-18 | Stop reason: HOSPADM

## 2020-08-17 RX ADMIN — ENOXAPARIN SODIUM 40 MG: 40 INJECTION SUBCUTANEOUS at 21:19

## 2020-08-17 RX ADMIN — DILTIAZEM HYDROCHLORIDE 30 MG: 30 TABLET, FILM COATED ORAL at 03:44

## 2020-08-17 RX ADMIN — IPRATROPIUM BROMIDE AND ALBUTEROL SULFATE 1 AMPULE: .5; 3 SOLUTION RESPIRATORY (INHALATION) at 07:58

## 2020-08-17 RX ADMIN — IPRATROPIUM BROMIDE AND ALBUTEROL SULFATE 1 AMPULE: .5; 3 SOLUTION RESPIRATORY (INHALATION) at 15:17

## 2020-08-17 RX ADMIN — METRONIDAZOLE 500 MG: 500 INJECTION, SOLUTION INTRAVENOUS at 00:24

## 2020-08-17 RX ADMIN — KETOROLAC TROMETHAMINE 15 MG: 30 INJECTION, SOLUTION INTRAMUSCULAR at 03:17

## 2020-08-17 RX ADMIN — CEFEPIME 2 G: 2 INJECTION, POWDER, FOR SOLUTION INTRAMUSCULAR; INTRAVENOUS at 05:33

## 2020-08-17 RX ADMIN — FUROSEMIDE 40 MG: 10 INJECTION, SOLUTION INTRAMUSCULAR; INTRAVENOUS at 10:18

## 2020-08-17 RX ADMIN — KETOROLAC TROMETHAMINE 15 MG: 30 INJECTION, SOLUTION INTRAMUSCULAR at 10:18

## 2020-08-17 RX ADMIN — ONDANSETRON 4 MG: 2 INJECTION INTRAMUSCULAR; INTRAVENOUS at 17:57

## 2020-08-17 RX ADMIN — MICONAZOLE NITRATE: 20 POWDER TOPICAL at 21:19

## 2020-08-17 RX ADMIN — DILTIAZEM HYDROCHLORIDE 30 MG: 30 TABLET, FILM COATED ORAL at 15:56

## 2020-08-17 RX ADMIN — DILTIAZEM HYDROCHLORIDE 30 MG: 30 TABLET, FILM COATED ORAL at 10:18

## 2020-08-17 RX ADMIN — CEFEPIME 2 G: 2 INJECTION, POWDER, FOR SOLUTION INTRAMUSCULAR; INTRAVENOUS at 16:09

## 2020-08-17 RX ADMIN — FAMOTIDINE 20 MG: 20 TABLET, FILM COATED ORAL at 10:18

## 2020-08-17 RX ADMIN — SODIUM CHLORIDE, PRESERVATIVE FREE 10 ML: 5 INJECTION INTRAVENOUS at 21:19

## 2020-08-17 RX ADMIN — METOCLOPRAMIDE 10 MG: 5 INJECTION, SOLUTION INTRAMUSCULAR; INTRAVENOUS at 21:18

## 2020-08-17 RX ADMIN — POLYETHYLENE GLYCOL 3350 17 G: 17 POWDER, FOR SOLUTION ORAL at 10:18

## 2020-08-17 RX ADMIN — FUROSEMIDE 40 MG: 10 INJECTION, SOLUTION INTRAMUSCULAR; INTRAVENOUS at 17:38

## 2020-08-17 RX ADMIN — DILTIAZEM HYDROCHLORIDE 30 MG: 30 TABLET, FILM COATED ORAL at 21:18

## 2020-08-17 RX ADMIN — ONDANSETRON 4 MG: 2 INJECTION INTRAMUSCULAR; INTRAVENOUS at 12:49

## 2020-08-17 RX ADMIN — Medication 1 CAPSULE: at 16:09

## 2020-08-17 RX ADMIN — METOCLOPRAMIDE 10 MG: 5 INJECTION, SOLUTION INTRAMUSCULAR; INTRAVENOUS at 05:32

## 2020-08-17 RX ADMIN — METRONIDAZOLE 500 MG: 500 INJECTION, SOLUTION INTRAVENOUS at 10:18

## 2020-08-17 RX ADMIN — CALCIUM GLUCONATE: 98 INJECTION, SOLUTION INTRAVENOUS at 17:38

## 2020-08-17 RX ADMIN — IPRATROPIUM BROMIDE AND ALBUTEROL SULFATE 1 AMPULE: .5; 3 SOLUTION RESPIRATORY (INHALATION) at 11:47

## 2020-08-17 RX ADMIN — KETOROLAC TROMETHAMINE 15 MG: 30 INJECTION, SOLUTION INTRAMUSCULAR at 14:46

## 2020-08-17 RX ADMIN — CALCIUM GLUCONATE 2 G: 98 INJECTION, SOLUTION INTRAVENOUS at 10:18

## 2020-08-17 RX ADMIN — IPRATROPIUM BROMIDE AND ALBUTEROL SULFATE 1 AMPULE: .5; 3 SOLUTION RESPIRATORY (INHALATION) at 19:12

## 2020-08-17 RX ADMIN — METRONIDAZOLE 500 MG: 500 INJECTION, SOLUTION INTRAVENOUS at 14:46

## 2020-08-17 RX ADMIN — FAMOTIDINE 20 MG: 20 TABLET, FILM COATED ORAL at 21:19

## 2020-08-17 ASSESSMENT — PAIN SCALES - GENERAL
PAINLEVEL_OUTOF10: 8
PAINLEVEL_OUTOF10: 6
PAINLEVEL_OUTOF10: 3
PAINLEVEL_OUTOF10: 2

## 2020-08-17 NOTE — PROGRESS NOTES
Hospitalist Progress Note      Patient:  Shannon Bass    Unit/Bed:3A-05/005-A  YOB: 1960  MRN: [id number]   Acct: [de-identified]   PCP: Debi Weston MD  Date of Admission: 8/7/2020    Assessment/Plan:    1. Acute hypoxic respiratory failure likely due to postop sedation, likely also has underlying CHICO. - Patient extubated on 8/11/2020 after requiring intubation for surgical procedure. - Patient continues to require oxygen to maintain adequate O2 saturations, currently on 1L via nasal cannula. - CPAP overnight,   - Pulm following   - breathing treatment  - Still + 13L fluid balance. Diuresed -1.1L since yesterday. Will continue IV lasix for further diuresis and hopefully help patient wean off oxygen. 2. Large left adnexal abscess: S/p exploratory laparotomy with left salpingo oophorectomy, small bowel exploration, and irrigation of purulent material on 8/8/2024 ovarian abscess per Dr. Heidi Cannon. - On IV cefepime and Flagyl (Day #10). 3. Acute normocytic anemia: Improving. Current H&H 10/33.3 with an MCV of 99.7. Most likely secondary    4. Postoperative ileus: Passing gas. Unknown last BM, but abdo is soft, NT. Advance diet per surgery. Continue reglan. No vomiting. 5. Leukocytosis: WBC count 18. Continue IV antibiotics. 6. GERD: Continue Pepcid. 7. Physical deconditioning: Secondary to above issues. PT/OT following, recommendations for continued therapy after discharge noted, appreciate assistance. 8. Obesity: BMI 33.74    9. Malnutrition   - Currently on TPN    Chief Complaint: Abdominal pain, hypotension    Initial H and P:-    **Per Chart Review:  Lydia Arshad is a 62year old  female transferred to [facility] ICU 8/8/2020 with hypotension and abdominal pain.  Patient has a significant history of former smoker-quit 2019, COPD, impaired glucose tolerance, GERD, chronic back pain, headaches, and obesity. Estrella presented to [facility] ER with complaint of abdominal pain, fever, vaginal bleeding.  Patient identifies onset vaginal bleeding with passage of clots, averaging 3-4 pads/day. Onset proximately 2 weeks ago. Miguel Roberts also identifies a fever of 102.9 that started approximately 2 days ago with complaints of lower abdominal pain. Eric Nur states she noted a mass coming out of her vagina and tried to pull it out unsuccessfully secondary to pain.  8/7/2020 transvaginal ultrasound noted large complex lesion in the left ovary could relate to abscess or hemorrhagic neoplasm.  CT abdomen pelvis with IV contrast mild groundglass airspace opacities within the lingula suggest pneumonia.  Mild fatty infiltration of liver, multiple gallstones biliary.  A spiculated cystic mass within the left pelvis.  Large pelvic mass which is mildly inflamed likely related to the left ovary. Evaluation occurred by OB/GYN Dr. Boles, an Endocervical polyp was removed and sent for pathology.  Patient was sent to [facility] ICU stepdown. 8/8/2020 I was called to evaluate patient secondary to hypotension.  Patient was not responsive to 2 L of 0.9 normal saline systolic blood pressure remained 68-74 with a heart rate of 120.  Levophed drip was started and patient was admitted to the ICU for further evaluation and care. Subjective (past 24 hours):   Patient resting in bed at the time of the interview. States that she feels tired. Complaining of metallic taste in her mouth. Still having \" indigestion\" which did improve with Pepcid and Zofran. No chest pain. Abdominal pain is slightly better. Has not had a bowel movement yet. But is passing gas. No vomiting. Still some nausea. Past medical history, family history, social history and allergies reviewed again and is unchanged since admission. ROS (14 point review of systems completed. Pertinent positives noted.  Otherwise ROS is negative) :  GENERAL: No fever,chills, or night sweats. Endorses fatigue, malaise. SKIN: No lesions or rashes. HEAD: No headaches or recent injury. EYES: No acute changes in vision, no diplopia or blurred vision. EARS: No hearing loss, no tinnitus. NOSE/THROAT: No rhinorrhea or pharyngitis, no nasal drainage. NECK: No lumps or unusual neck stiffness. PULMONARY: Respirations easy and non-labored, no acute distress. CARDIAC: No chest pain, pressure, Negative for lower leg edema. GI: Abdomen is soft and non-tender, non-distended. PERIPHERAL VASCULAR: No intermittent claudication or unusual leg cramps. MUSCULOSKELETAL: Occasional arthralgias, myalgias. NEUROLOGICAL: Denies any headache, near syncope, seizures or syncope. HEMATOLOGIC:  No unusual bruising or bleeding. PSYCH: Denies any homicidal or suicidial ideations.     Medications:  Reviewed    Infusion Medications    PN-Adult  3 IN 1 Central Line (Custom)      PN-Adult Premix 4.25/5 - Peripheral Line 60 mL/hr at 08/16/20 2036    dextrose       Scheduled Medications    famotidine  20 mg Oral BID    furosemide  40 mg Intravenous BID    polyethylene glycol  17 g Oral Daily    metoclopramide  10 mg Intravenous Q12H    dilTIAZem  30 mg Oral 4 times per day    phosphorus replacement protocol   Other RX Placeholder    magnesium replacement protocol   Other RX Placeholder    lidocaine 1 % injection  5 mL Intradermal Once    ipratropium-albuterol  1 ampule Inhalation Q4H WA    insulin lispro  0-6 Units Subcutaneous Q6H    ketorolac  15 mg Intravenous Q6H    calcium replacement protocol   Other RX Placeholder    sodium chloride flush  10 mL Intravenous 2 times per day    enoxaparin  40 mg Subcutaneous Daily    cefepime  2 g Intravenous Q12H    metroNIDAZOLE  500 mg Intravenous Q8H     PRN Meds: albuterol sulfate HFA, morphine **OR** morphine, bisacodyl, [Held by provider] HYDROcodone-acetaminophen, fentanNYL, acetaminophen **OR** acetaminophen, potassium chloride, potassium chloride, potassium chloride **OR** potassium alternative oral replacement **OR** potassium chloride, glucose, dextrose, glucagon (rDNA), dextrose, sodium chloride flush, promethazine **OR** ondansetron      Intake/Output Summary (Last 24 hours) at 8/17/2020 1418  Last data filed at 8/17/2020 1353  Gross per 24 hour   Intake 1980.76 ml   Output 2550 ml   Net -569.24 ml       Diet:  PN-Adult Premix 4.25/5   DIET GENERAL;  PN-Adult  3 IN 1 Central Line (Custom)    Exam:  /78   Pulse 99   Temp 98.4 °F (36.9 °C) (Oral)   Resp 26   Ht 5' 8\" (1.727 m)   Wt 221 lb 14.4 oz (100.7 kg)   SpO2 90%   BMI 33.74 kg/m²     General appearance: Alert and appropriate, acutely ill-appearing pleasant obese female. No apparent distress, appears stated age and cooperative. Patient does appear fatigued. HEENT: Pupils equal, round, and reactive to light. Conjunctivae/corneas clear. Neck: Supple, with full range of motion. No jugular venous distention. Trachea midline. Respiratory:  Normal respiratory effort. Clear to auscultation, bilaterally without Rales/Wheezes/Rhonchi. Cardiovascular: Regular rate and rhythm with normal S1/S2 without murmurs, rubs or gallops. Abdomen: Soft, non-tender, non-distended with normal bowel sounds. Surgical site dry without signs of infection. Musculoskeletal: Passive and active ROM x 4 extremities. Skin: Skin color, texture, turgor normal.  No rashes or lesions. Neurologic:  Neurovascularly intact without any focal sensory/motor deficits. Cranial nerves: II-XII intact, grossly non-focal.  Psychiatric: Alert and oriented to person, place, time, and situation.  Thought content appropriate, normal insight  Capillary Refill: Brisk,< 3 seconds   Peripheral Pulses: +2 palpable, equal bilaterally     Labs:   Recent Labs     08/15/20  0414 08/17/20  0336   WBC 12.3* 18.6*   HGB 8.9* 9.6*   HCT 30.3* 31.5*    427*     Recent Labs     08/15/20  0414 08/16/20 0413 08/17/20 0336    135 132* technology. **      Final report electronically signed by Dr. Elliot Chiu on 8/15/2020 9:08 AM      XR CHEST PORTABLE   Final Result   Minimal opacity in the left lung base possible atelectasis, minimal infiltrate or effusion. **This report has been created using voice recognition software. It may contain minor errors which are inherent in voice recognition technology. **      Final report electronically signed by Dr. Rigoberto Arroyo on 8/13/2020 2:13 PM      XR ABDOMEN (KUB) (SINGLE AP VIEW)   Final Result   Increased gaseous distention throughout the small bowel loops. This is likely related to postsurgical ileus although a bowel obstruction can't be excluded. **This report has been created using voice recognition software. It may contain minor errors which are inherent in voice recognition technology. **      Final report electronically signed by Dr Donald Haddad on 8/12/2020 9:16 AM      XR CHEST PORTABLE   Final Result      Improved lateral left basilar infiltrate/atelectasis/small left pleural effusion. Mild atelectasis at the medial right lung base. Possible left hilar adenopathy which appears to be mildly improved. **This report has been created using voice recognition software. It may contain minor errors which are inherent in voice recognition technology. **      Final report electronically signed by Dr. Javid Arzate on 8/10/2020 5:19 AM      XR ABDOMEN (KUB) (SINGLE AP VIEW)   Final Result      Non obstructive bowel gas pattern. No evidence of an ileus. **This report has been created using voice recognition software. It may contain minor errors which are inherent in voice recognition technology. **      Final report electronically signed by Dr. Javid Arzate on 8/10/2020 3:26 AM      XR CHEST PORTABLE   Final Result   ET node G-tube positions as above. Left lower lobe atelectasis or infiltrate.             **This report has been created using voice recognition software. It may contain minor errors which are inherent in voice recognition technology. **      Final report electronically signed by Dr. Boni Valentino on 8/8/2020 9:54 AM      XR CHEST PORTABLE   Final Result      Interval placement of a right jugular central venous catheter, tip in the mid SVC, no pneumothorax. New opacity at the lateral left lung base which may represent a small infiltrate, atelectasis or small left pleural effusion. Interstitial prominence, acute versus chronic. See discussion above and correlate clinically. **This report has been created using voice recognition software. It may contain minor errors which are inherent in voice recognition technology. **      Final report electronically signed by Dr. Marylu Ruiz on 8/8/2020 5:53 AM      XR CHEST 1 VIEW   Final Result   No confluent infiltrate. PA and lateral radiographs could be performed if indicated clinically. **This report has been created using voice recognition software. It may contain minor errors which are inherent in voice recognition technology. **      Final report electronically signed by Dr. Tan Varma on 8/7/2020 1:30 PM      US NON OB TRANSVAGINAL   Final Result      Large complex lesion in the left ovary could relate to abscess or hemorrhagic neoplasm either benign or malignant. Lack of significant ascites mitigates against malignancy. **This report has been created using voice recognition software. It may contain minor errors which are inherent in voice recognition technology. **      Final report electronically signed by Dr. Tan Varma on 8/7/2020 1:44 PM      US DUP ABD PEL RETRO SCROT COMPLETE   Final Result      Large complex lesion in the left ovary could relate to abscess or hemorrhagic neoplasm either benign or malignant. Lack of significant ascites mitigates against malignancy. **This report has been created using voice recognition software.  It may contain minor recognition technology. ** Final report electronically signed by Dr. Ismael Cohen on 8/7/2020 1:44 PM    Ct Abdomen Pelvis W Iv Contrast Additional Contrast? None    Result Date: 8/7/2020  PROCEDURE: CT ABDOMEN PELVIS W IV CONTRAST CLINICAL INFORMATION: abdominal pain, pelvic pain . COMPARISON: None. TECHNIQUE: 5 mm axial CT images were obtained through the abdomen and pelvis after the administration of intravenous and oral contrast. Coronal and sagittal reconstructions were obtained. All CT scans at this facility use dose modulation, iterative reconstruction, and/or weight-based dosing when appropriate to reduce radiation dose to as low as reasonably achievable. FINDINGS Lung base: Mild groundglass airspace opacities within the lingula suggest pneumonia. Mild left lower lobe scar. Liver and spleen: Mild fatty infiltration. Biliary: Multiple gallstones. Pancreas: head, body, and tail unremarkable. Adrenals: symmetric, no calcifications or masses. Kidneys: reniform, no hyperdense masses, large  calculi, or hydronephrosis. Aorta: normal caliber. Lymph Nodes: normal size. Bowel: Normal caliber. No evidence of obstruction. Mild sigmoid diverticulosis. Appendix is borderline enlarged though is not inflamed and thickened. Bladder: Normal Reproductive: A septated cystic mass within the left pelvis measures 13.5 cm x 9.0 cm. This most likely relates to the left ovary. A uterine source is also possible. This focus has mild adjacent inflammation. There is a candidate for right ovary contains multiple small follicles. The uterus contains a low attenuation focus inferiorly which could relate to nabothian cyst measuring 19 mm. Bones: Fusion at L5-S1. Lingular pneumonia. Cholelithiasis. Large pelvic mass which is mildly inflamed likely relating to the left ovary. Benign and malignant etiologies are possible. **This report has been created using voice recognition software.  It may contain minor errors which are inherent in voice recognition technology. ** Final report electronically signed by Dr. Valdez Moreno on 8/7/2020 1:23 PM    Xr Chest Portable    Result Date: 8/8/2020  PROCEDURE: XR CHEST PORTABLE CLINICAL INFORMATION: ett placement . COMPARISON: No prior study. TECHNIQUE: Portable semiupright FINDINGS: Endotracheal tube is 4 cm above the kirby. Central line is unchanged. NG tube extends into the stomach. Multiple EKG leads overlie the chest. Patient is rotated. There is left lower lobe atelectasis or infiltrate. Pulmonary vessels are not congested. Right lung is clear. ET node G-tube positions as above. Left lower lobe atelectasis or infiltrate. **This report has been created using voice recognition software. It may contain minor errors which are inherent in voice recognition technology. ** Final report electronically signed by Dr. Home Wilkins on 8/8/2020 9:54 AM    Xr Chest Portable    Result Date: 8/8/2020  PROCEDURE: XR CHEST PORTABLE CLINICAL INFORMATION: CVC placement. COMPARISON: Chest x-ray dated 8/7/2020 TECHNIQUE: AP Portable chest xray FINDINGS: Lines/tubes/devices: Interval placement of a right jugular central venous catheter, tip in the region of the mid SVC. Lungs/pleura: There are mild bilateral interstitial infiltrates which probably represent interstitial pulmonary fibrosis however cannot exclude mild interstitial pulmonary edema or an atypical viral-type pneumonia. Correlate clinically. There is a new patchy opacity at the lateral left lung base obscuring the costophrenic angle which may represent a small pneumonia, atelectasis, or tiny left pleural effusion. There is no pneumothorax. Heart: Heart size is normal. Mediastinum/filiberto: No obvious mass or adenopathy. Skeleton: No significant bone or joint abnormality. Interval placement of a right jugular central venous catheter, tip in the mid SVC, no pneumothorax.  New opacity at the lateral left lung base which may represent a small infiltrate, atelectasis or small left pleural effusion. Interstitial prominence, acute versus chronic. See discussion above and correlate clinically. **This report has been created using voice recognition software. It may contain minor errors which are inherent in voice recognition technology. ** Final report electronically signed by Dr. Iker Angulo on 8/8/2020 5:53 AM    Xr Chest 1 View    Result Date: 8/7/2020  PROCEDURE: XR CHEST 1 VIEW CLINICAL INFORMATION: fever. COMPARISON: Multiple previous most recent 3/5/2018 TECHNIQUE: AP upright view of the chest. FINDINGS: Heart size is normal. Mild and lower lobe opacities are linear favoring scar. No definite confluent infiltrate. No confluent infiltrate. PA and lateral radiographs could be performed if indicated clinically. **This report has been created using voice recognition software. It may contain minor errors which are inherent in voice recognition technology. ** Final report electronically signed by Dr. Tana Smith on 8/7/2020 1:30 PM    Us Dup Abd Pel Retro Scrot Complete    Result Date: 8/7/2020  PROCEDURE: US NON OB TRANSVAGINAL, US DUP ABD PEL RETRO SCROT COMPLETE CLINICAL INFORMATION: cervical mass, vaginal bleeding. Fever COMPARISON: CT 8/7/2020 TECHNIQUE: Transvaginal images were performed . FINDINGS: LMP - 20-30 years ago Uterus - 7.8 x 3.4 cm Endometrium - 1 cm Right Ovary - 4.3 x 1.9 x 1.6 cm Left Ovary - 13.6 x 9.2 x 8.5 cm The uterus is normal. A 2.7 cm hypoechoic area is noted near or within the cervix. It does not represent a simple. The endometrium is normal.  There is a small amount of simple appearing pelvic free fluid. The right ovary is of normal size. There are small cysts and follicles, the largest measuring 2.5 cm  There is normal color flow. There is no adnexal mass. The left ovary is of normal size. There is an 11.5 cm mass within the left ovary.  It has a large amount of fairly homogeneous layering material. Hemorrhage could cause this as could pus. There is normal color flow. There is no adnexal mass. Large complex lesion in the left ovary could relate to abscess or hemorrhagic neoplasm either benign or malignant. Lack of significant ascites mitigates against malignancy. **This report has been created using voice recognition software. It may contain minor errors which are inherent in voice recognition technology. ** Final report electronically signed by Dr. Valdez Moreno on 8/7/2020 1:44 PM      **This report has been created using voice recognition software. It may contain minor errors which are inherent in voice recognition technology. **  Electronically signed by Bren Merlos MD on 8/17/2020 at 2:18 PM

## 2020-08-17 NOTE — PROGRESS NOTES
c/d/i sm amount of serous drainage. MUSCULOSKELETAL:  there is no redness, warmth, or swelling of the joints    Data:  CBC:   Lab Results   Component Value Date    WBC 18.6 08/17/2020    RBC 3.21 08/17/2020    HGB 9.6 08/17/2020    HCT 31.5 08/17/2020    MCV 98.1 08/17/2020    RDW 13.2 03/05/2018     08/17/2020     BMP:    Lab Results   Component Value Date     08/17/2020    K 4.6 08/17/2020    K 3.5 08/08/2020    CL 98 08/17/2020    CO2 28 08/17/2020    BUN 26 08/17/2020    CREATININE 0.3 08/17/2020    CALCIUM 7.5 08/17/2020    LABGLOM >90 08/17/2020    GLUCOSE 135 08/17/2020     Urine Culture:  neg  Blood Culture: neg  Peritoneal culture fusobacterium  Sputum Culture:  No components found for: CSPUTUM  CVP Mean:      ICU guidelines/prophylaxis active for the following:    insulin guidelines, DVT prophylaxis and ulcer prophylaxis    ASSESSMENT & PLAN:    Active Problems:  Day 10 S/P exp lap for TOA. ID -Afebrile but WBC up today. Check procalcitonin. Continue abx at [facility] to day 14  Continue metronidazole and Maxipime. WBC 18,       Bowel function - Improved but with \"indigestion\" . Sarita Medina Will try toast as clears are not appetizing to her  Nutrition -Would start TPN as she is not advancing diet yet. Resp off O2 and getting resp treatments    When Bowel fxn returns we can transition to PO antibiotics flagyl and doxy. Discharge will depend on functional status once bowel function returns. . Home health vs home with son. Doubt long term need for Moshe unless functional status does not improve over the next few days. Adequate Urine output. Increase activity./ Physical therapy  Bhat out.         Sharyle Peels Stallkamp 8/17/2020 8:15 AM

## 2020-08-17 NOTE — PLAN OF CARE
Problem: Pain:  Goal: Pain level will decrease  Description: Pain level will decrease  8/16/2020 2152 by Radha Bonilla RN  Outcome: Ongoing  Note: Pain Assessment: 0-10  Pain Level: 0   Patient's Stated Pain Goal: No pain   Is pain goal met at this time? Yes     Non-Pharmaceutical Pain Intervention(s): Repositioned    8/16/2020 1718 by Sandip Walters RN  Outcome: Ongoing  Note: Continually monitoring patients surgical pain to abdomen. Patient is on scheduled toradol for pain control. Patient reports pain using 0-10 pain rating scale. Prior to toradol administration patient stated that the pain was a 9/10 to their abdomen, was worsening, and was not radiating anywhere and that it was sharp and constant. Problem: Nutrition  Goal: Optimal nutrition therapy  8/16/2020 2152 by Radha Bonilla RN  Outcome: Ongoing  Note: Pt on clear liquids  8/16/2020 1718 by Sandip Walters RN  Outcome: Ongoing  Note: Patient receiving TPN at 75 ml/hr through their PICC. Patient on clear liquids at this time and not tolerating oral intake very well. Patient stated that the liquids do not taste good and make her stomach hurt very badly. Encouraging patient to increase their oral intake. Problem: Discharge Planning:  Goal: Participates in care planning  Description: Participates in care planning  8/16/2020 2152 by Radha Bonilla RN  Outcome: Ongoing  Note: Pt updated on plan of care- all questions answered  8/16/2020 1718 by Sandip Walters RN  Outcome: Ongoing  Note: Patient is from home with son and social work is working to determine placement. Possible Moshe evaluation.    Goal: Discharged to appropriate level of care  Description: Discharged to appropriate level of care  Outcome: Ongoing  Note: Pt planning home on discharge     Problem: Airway Clearance - Ineffective:  Goal: Ability to maintain a clear airway will improve  Description: Ability to maintain a clear airway will improve  8/16/2020 2152 by Corrie Monique Jerad Guevara RN  Outcome: Ongoing  Note: Pt on room air this shift  8/16/2020 1718 by Scotty Yarbrough RN  Outcome: Ongoing  Note: Encouraging patient to cough and deep breath and to support stomach with pillow. IV Lasix 40 mg given BID. Problem: Anxiety/Stress:  Goal: Level of anxiety will decrease  Description: Level of anxiety will decrease  Outcome: Ongoing  Note: Pt shows no signs of anxiety this shift     Problem: Aspiration:  Goal: Absence of aspiration  Description: Absence of aspiration  Outcome: Ongoing  Note: No signs of aspiration, on clear liquid diet     Problem: Bowel Function - Altered:  Goal: Bowel elimination is within specified parameters  Description: Bowel elimination is within specified parameters  8/16/2020 2152 by Gilmar Sanderson RN  Outcome: Ongoing  Note: Pt did not have bowel movement this shift  8/16/2020 1718 by Scotty Yarbrough RN  Outcome: Ongoing  Note: Bowel sounds noted. No bowel movement thus far. Patient reports gas. Problem: Cardiac Output - Decreased:  Goal: Hemodynamic stability will improve  Description: Hemodynamic stability will improve  8/16/2020 2152 by Gilmar Sanderson RN  Outcome: Ongoing  Note:   Vitals:    08/16/20 1407 08/16/20 1537 08/16/20 1739 08/16/20 2030   BP:  (!) 143/72  134/60   Pulse:  98  109   Resp: 28 28 23 18   Temp:  98.5 °F (36.9 °C)  98.3 °F (36.8 °C)   TempSrc:  Oral  Oral   SpO2: 93% 94% 93%    Weight:       Height:           8/16/2020 1718 by Scotty Yarbrough RN  Outcome: Ongoing  Note: Noted patient with more frequent runs of SVT. Notified hospitalist who started patient on PO cardizem four times a day. Monitoring heart rate and rhythm continually through bedside cardiac monitoring.       Problem: Fluid Volume - Imbalance:  Goal: Absence of imbalanced fluid volume signs and symptoms  Description: Absence of imbalanced fluid volume signs and symptoms  Outcome: Ongoing  Note: Strict I's and O's- pt has olivera catheter     Problem: Gas Exchange - Impaired:  Goal: Levels of oxygenation will improve  Description: Levels of oxygenation will improve  8/16/2020 2152 by Luana Crespo RN  Outcome: Ongoing  Note: Pt on room air, continuous pulse ox  8/16/2020 1718 by Roberto Cantu RN  Outcome: Ongoing  Note: Weaned patient to room air. Patient tolerated this well. Encouraging patient to cough and deep breath with pillow support to abdominal dressing. Problem: Nutrition Deficit:  Goal: Ability to achieve adequate nutritional intake will improve  Description: Ability to achieve adequate nutritional intake will improve  Outcome: Ongoing  Note: Pt on TPN and clear liquid diet- poor appetite     Problem: Pain:  Goal: Pain level will decrease  Description: Pain level will decrease  8/16/2020 2152 by Luana Crespo RN  Outcome: Ongoing  Note: Pain Assessment: 0-10  Pain Level: 0   Patient's Stated Pain Goal: No pain   Is pain goal met at this time? Yes     Non-Pharmaceutical Pain Intervention(s): Repositioned    8/16/2020 1718 by Roberto Cantu RN  Outcome: Ongoing  Note: Continually monitoring patients surgical pain to abdomen. Patient is on scheduled toradol for pain control. Patient reports pain using 0-10 pain rating scale. Prior to toradol administration patient stated that the pain was a 9/10 to their abdomen, was worsening, and was not radiating anywhere and that it was sharp and constant. Problem: Serum Glucose Level - Abnormal:  Goal: Ability to maintain appropriate glucose levels will improve to within specified parameters  Description: Ability to maintain appropriate glucose levels will improve to within specified parameters  8/16/2020 2152 by Luana Crespo RN  Outcome: Ongoing  Note: Chem ACHS  8/16/2020 1718 by Roberto Cantu RN  Outcome: Ongoing  Note: Checking blood glucose every 6 hours and replacing with sliding scale insulin coverage as ordered.       Problem: Skin Integrity - Impaired:  Goal: Will show no infection Jj Memory  Outcome: Ongoing  8/16/2020 1045 by Ellwood Cooks, RCP  Outcome: Ongoing     Problem: Falls - Risk of:  Goal: Will remain free from falls  Description: Will remain free from falls  Outcome: Ongoing  Note: Patient did not fall this shift. Call light in reach, bed alarm on. Care plan reviewed with patient. Patient verbalize understanding of the plan of care and contribute to goal setting.

## 2020-08-17 NOTE — PLAN OF CARE
Problem: Pain:  Goal: Pain level will decrease  Description: Pain level will decrease  Outcome: Ongoing  Note: Scheduled Toradol for abdominal pain. Mid Abdominal incision tenderness - dressing changed by  - [place] ribbon packing to 4 separate sites of the abdominal incision  PRN pain medication available upon request.      Problem: Nutrition  Goal: Optimal nutrition therapy  8/17/2020 1344 by Lilly Benjamin RN  Outcome: Ongoing  Note: New order received for a general diet. Patient only ate 5% of breakfast and only 15% of lunch. PRN zofran administered durring lunch due to increased nausea. No vomiting or emesis noted  Bowel sounds active x 4. No BM today. TPN as ordered per pharmacy. Problem: Musculor/Skeletal Functional Status  Goal: Highest potential functional level  Outcome: Ongoing  Note: Increased activity today - up to chair with therapy   Tolerated activity well however was in need of rolling walker and assist x 1 when up. Problem: Discharge Planning:  Goal: Participates in care planning  Description: Participates in care planning  Outcome: Ongoing  Note: Will likely need rehab or therapy for strengthening      Problem: Pain:  Goal: Pain level will decrease  Description: Pain level will decrease  Outcome: Ongoing  Note: Scheduled Toradol for abdominal pain. Mid Abdominal incision tenderness - dressing changed by Dr.Stallkamp Purvi Lisa ribbon packing to 4 separate sites of the abdominal incision  PRN pain medication available upon request.      Problem: Infection - Central Venous Catheter-Associated Bloodstream Infection:  Goal: Will show no infection signs and symptoms  Description: Will show no infection signs and symptoms  Outcome: Ongoing  Note: Patient is afebrile this shift. WBC's increassed today however she has been asymptomatic   PICC line site is without redness or swelling   No signs or symptoms of infection noted.       Problem: Infection - Surgical Site:  Goal: Will show no infection signs and symptoms  Description: Will show no infection signs and symptoms  Outcome: Ongoing  Note: Patient is afebrile this shift. WBC's increassed today however she has been asymptomatic   PICC line site is without redness or swelling   No signs or symptoms of infection noted. Problem: Urinary Elimination:  Goal: Signs and symptoms of infection will decrease  Description: Signs and symptoms of infection will decrease  Outcome: Ongoing  Note: Olivera catheter to be removed today. Patient has requested to have the olivera removed after she has returned to the bed. Problem: Skin Integrity:  Goal: Will show no infection signs and symptoms  Description: Will show no infection signs and symptoms  Outcome: Ongoing  Note: Patient is afebrile this shift. WBC's increassed today however she has been asymptomatic   PICC line site is without redness or swelling   No signs or symptoms of infection noted. Problem: Falls - Risk of:  Goal: Will remain free from falls  Description: Will remain free from falls  Outcome: Ongoing  Note: No falls this shift. Patient making use of call light for assist when needed. Care Plan reviewed in detail with patient. Patient has verbalized understanding regarding current treatment plan and is compliant with current treatment.

## 2020-08-17 NOTE — CARE COORDINATION
8/17/20, 3:12 PM EDT    DISCHARGE ON GOING [facility] day: 10  Location: -05/005-A Reason for admit: Ovarian mass [N83.8]  Ovarian mass [N83.8]   Procedure: 8/7 CT Abd/pelvis: Lingular pneumonia; cholelithiasis; Large pelvic mass which is mildly inflamed likely relating to the left ovary. Benign and malignant etiologies are possible  8/7 US Abd/pelvis: Large complex lesion in the left ovary could relate to abscess or hemorrhagic neoplasm either benign or malignant. Lack of significant ascites mitigates against malignancy  8/7 Transvaginal US: Large pelvic mass which is mildly inflamed likely relating to the left ovary. Benign and malignant etiologies are possible  8/8 LAPAROTOMY EXPLORATORY, LEFT SALPINGO OOPHORECTOMY, SMALL BOWEL EXPLORATION, irrigation of purulent material  8/8 Intubated  8/8 CVC RIJ  8/10 KUB: Non obstructive bowel gas pattern; no evidence of ileus  8/11 Extubated  8/12 KUB: Increased gaseous distention throughout the small bowel loops. This is likely related to postsurgical ileus although a bowel obstruction can't be excluded. 8/13 PICC placed. Treatment Plan of Care: Hospitalist, OB/GYN and General Surgery following. POD #9. Sodium 132, BUN 26. WBC 18.6. Hgb 9.6. PT/OT. Cefepime iv q12hr, Cardizem po q6hr, Lovenox, Lasix iv bid, DuoNeb q4hr WA, Reglan iv q8hr, pain and nausea control. TPN continues. Dietitian and Pharmacy dosing. Wound care. Barriers to Discharge: Not medically ready. PCP: Terry Wang MD  Readmission Risk Score: 17%  Patient Goals/Plan/Treatment Preferences: Home alone with [facility] vs home with son in [place] vs [facility], [facility]. Insurance denied LTACH, P2P to be done tomorrow.

## 2020-08-17 NOTE — PROGRESS NOTES
CLINICAL PHARMACY NOTE: MEDS TO [address] Select Patient?: Yes  Total # of Prescriptions Filled: 1   The following medications were delivered to the patient:  Stiolto Respimat 2.5-2.5   Total # of Interventions Completed: 2  Time Spent (min): 30    Additional Documentation:

## 2020-08-17 NOTE — PLAN OF CARE
Problem: Impaired respiratory status  Goal: Clear lung sounds  Outcome: Ongoing  Note: Cont metaneb to improve aeration and cough effectiveness

## 2020-08-17 NOTE — PROGRESS NOTES
[address] [address]  INPATIENT PHYSICAL THERAPY  DAILY NOTE  [facility]U 3A - 3A-05/005-A      Time In:   Time Out:   Timed Code Treatment Minutes: 39 Minutes  Minutes: 41          Date: 2020  Patient Name: Titi Scott,  Gender:  female        MRN: [id number]  : 1960  (62 y.o.)     Referring Practitioner: Mariana Jerry CNP  Diagnosis: ovarian mass  Additional Pertinent Hx: Per ER note on 2020:59 y.o. postmenopausal female with past medical history of COPD and chronic back pain who is being admitted for sepsis and pelvic mass. Ms. Abhijeet Tate noticed a mild amount of painless vaginal bleeding that began on Tuesday of last week 20. This continued daily and worsened until the patient passed tissue and clots this  while using the restroom. The patient noted \"something was protruding from my vagina, so I pushed it back in\". The bleeding maintained and on 2020 the patient began having fevers at home. s/pLAPAROTOMY EXPLORATORY, LEFT SALPINGO OOPHORECTOMY, SMALL BOWEL EXPLORATION, irrigation of purulent material on 2020. Intubated  to 2020     Prior Level of Function:  Lives With: Alone  Type of Home: House  Home Layout: Two level, Bed/Bath upstairs  Home Access: Stairs to enter with rails(3)  Entrance Stairs - Number of Steps: 4   Bathroom Shower/Tub: Tub/Shower unit  Bathroom Toilet: Standard  Bathroom Equipment: Grab bars in shower    ADL Assistance: [address]: Independent  Transfer Assistance: Independent  Additional Comments: Pt reports fully indep PLOF.     Restrictions/Precautions:  Restrictions/Precautions: General Precautions, Fall Risk  Position Activity Restriction  Other position/activity restrictions: recent abdominal sx, son reports chronic back issues that Pt often braces self with transitions in mobility    SUBJECTIVE: nursing ok'd therapy, pt in bed and agreeable for therapy, pt was slow to respond and very flat it took her an extended time to complete all activity but she did follow directions     PAIN: abd pain and pain in left LE with some ex no number given         OBJECTIVE:  Bed Mobility:  Rolling to Right: Moderate Assistance, with use of rail    Supine to Sit: Maximum Assistance, at trunk and LEs did have HOB elevated ~ 30 degrees, pt got 1/2 way up and then rested for several min   Scooting: Moderate Assistance, to edge of bed with extra time     Transfers:  Sit to Stand: Maximum Assistance, first trial from edge of bed and pt took nearly 10 min to even attempt to stand she needed cues for hand placement as she was wanting to pull on walker, pt then completed 5 sit to stands from recliner with use of armrest and min assist of one person still moving slow and taking exta time but much improved then her initial stand   Stand to [address], cues for reaching back and to control descend, pt needed min assist to scoot back in chair leaning side to side     Ambulation:  Minimal Assistance  Distance: 5 feet   Surface: Level Tile  Device:Rolling Walker  Gait Deviations:  Slow emperatriz and noted heavy reliance on the walker encouraged her to increase her distance or to walk a second time but she c/o of fatigue     Balance:  sitting edge of bed in prep for transfer with supervision, static standing at walker with CGA attempted some standing ex however she wasn't able to tolerate standing for very long     Exercise:  Patient was guided in 1 set(s) 10 reps of exercise to both lower extremities. Ankle pumps, Glut sets, Quad sets, Heelslides and with assist, short arc qauds, seated long arc quads, and hip flexion x 10 reps each encouraged her to complete ex throughout the day . Exercises were completed for increased independence with functional mobility.     Functional Outcome Measures: Completed  AM-PAC Inpatient Mobility Raw Score : 11  AM-PAC Inpatient T-Scale Score : 33.86    ASSESSMENT:  Assessment: Patient progressing toward established goals. and pt demonstrated generalized weakness, she would greatly benefit from cont skilled therapy to work on strength, balance, endurance and increased independence with functional mobility prior to return home   Activity Tolerance:  Patient tolerance of  treatment: fair. Equipment Recommendations:Equipment Needed: No  Discharge Recommendations:    (pt would benefit from an inpatient therapy stay prior to return home, she is unsafe to return home at this date)    Plan: Times per week: 5X GM  Times per day: Daily  Specific instructions for Next Treatment: therex and mobility    Patient Education  Patient Education: Plan of Care, discussed importance of ex and up to chair throughout the day     Goals:  Patient goals : less pain  Short term goals  Time Frame for Short term goals: by discharge  Short term goal 1: bed mobility with SBA to get in/out of bed  Short term goal 2: transfer with SBA to get in/out of chairs  Short term goal 3: amb >50'x1 with RW and SBA to walker safely in room  Long term goals  Time Frame for Long term goals : no LTGs set secondary to short ELOS    Following session, patient left in safe position with all fall risk precautions in place.

## 2020-08-17 NOTE — PROGRESS NOTES
TPN Follow Up Note    Assessment: Passing gas. Tolerating ice chips. Diet changed to general. Will change back to 3-in-1 TPN today. Electrolyte Replacement: Calcium gluconate 2g  IV x 1 dose    TPN changes for (today) at 1800:    Increase NaCl to 130meq  See dietitian's note for macronutrient recommendations (no change to previous recs)    Re-check BMP, Mg, PO4, iCa tomorrow 8/18/20 AM    Janine Ross PharmD, BCPS 8/17/2020 11:56 AM

## 2020-08-17 NOTE — PROGRESS NOTES
[facility]      Patient - Cecy Yeung   MRN -  [id number]   [facility] # - [de-identified]   - 1960      Date of Admission -  2020 10:16 AM  Date of evaluation -  2020  Room - 3A--A   Hospital Day - [address] MD Primary Care Physician - Anayeli Gaona MD     Problem List      Active Hospital Problems    Diagnosis Date Noted    Sleep apnea [G47.30]     Stage 3 severe COPD by GOLD classification [facility]) [J44.9]     Chronic hypercapnic respiratory failure ([facility] [J96.12]     Septic shock ([facility] [A41.9, R65.21]     Ovarian abscess [N70.92] 2020    Panlobular emphysema ([facility] [J43.1] 03/15/2018     Reason for Consult    CO2 Retention  HPI   History Obtained From: Patient and electronic medical record. Cecy Yeung is a 62 y.o. female presented to [facility] ER on 2020 with complaints of abdominal pain, fever, vaginal bleeding. Transvaginal ultrasound noted large complex lesion in the left ovary could relate to abscess or hemorrhagic neoplasm. Evaluation occurred by Dr. Jayshree Osullivan, and endocervical polyp was removed and sent for pathology. Patient was sent to [facility] ICU stepdown. On 2020, patient was hypotensive and not responsive to 2 L of 0.09 normal saline systolic blood pressure remained 60-74 with a heart rate of 120. Levophed drip was started and patient was admitted to the ICU for further evaluation and care. On 2020, exploratory laparotomy with left salpingo-oophorectomy and small bowel exploration for ovarian abscess per Dr. Jayshree Osullivan. Patient was returned to ICU on mechanical ventilation. Patient was extubated on 2020 and on 2020 patient was stable to be transferred out of ICU. Pulmonary medicine was consulted because of a high pCO2 on ABG. During our encounter, patient complains of shortness of breath and a dry cough. Patient denies chest pain, hemoptysis, leg swelling.   Patient states Subcutaneous Daily    cefepime  2 g Intravenous Q12H    metroNIDAZOLE  500 mg Intravenous Q8H     albuterol sulfate HFA, morphine **OR** morphine, bisacodyl, [Held by provider] HYDROcodone-acetaminophen, fentanNYL, acetaminophen **OR** acetaminophen, potassium chloride, potassium chloride, potassium chloride **OR** potassium alternative oral replacement **OR** potassium chloride, glucose, dextrose, glucagon (rDNA), dextrose, sodium chloride flush, promethazine **OR** ondansetron  IV Drips/Infusions   PN-Adult Premix 4.25/5 - Peripheral Line 60 mL/hr at 08/16/20 2036    dextrose       Home Medications  Medications Prior to Admission: famotidine (PEPCID) 20 MG tablet, Take 20 mg by mouth 2 times daily  albuterol sulfate  (90 Base) MCG/ACT inhaler, INHALE 2 PUFFS BY MOUTH INTO THE LUNGS EVERY 6 HOURS AS NEEDED FOR WHEEZING  nicotine polacrilex (EQL NICOTINE) 4 MG lozenge, Take 1 lozenge by mouth as needed for Smoking cessation  Cyclobenzaprine HCl (FLEXERIL PO), Take  by mouth nightly. oxyCODONE-acetaminophen (PERCOCET) 7.5-325 MG per tablet, Take 1 tablet by mouth 2 times daily as needed. ibuprofen (ADVIL;MOTRIN) 200 MG tablet, Take 400 mg by mouth every 8 hours as needed. Pseudoephedrine-Guaifenesin (MUCINEX D PO), Take by mouth  Diet    Dietary Nutrition Supplements: Clear Liquid Oral Supplement  PN-Adult Premix 4.25/5   DIET GENERAL;  Allergies    Latex; Flu virus vaccine;  Fluzone [influenza vac split quad]; Sulfa antibiotics; and Lead (elemental)  Social History     Social History     Socioeconomic History    Marital status: Single     Spouse name: Not on file    Number of children: 1    Years of education: 15    Highest education level: High school graduate   Occupational History    Not on file   Social Needs    Financial resource strain: Not hard at all   [address] insecurity     Worry: Never true     Inability: Never true   [employer] needs     Medical: No     Non-medical: No   Tobacco Use    Smoking status: Former Smoker     Packs/day: 0.50     Years: 40.00     Pack years: 20.00     Types: Cigarettes     Start date: 1978     Last attempt to quit: 2018     Years since quittin.3    Smokeless tobacco: Never Used   Substance and Sexual Activity    Alcohol use: No     Alcohol/week: 0.0 standard drinks    Drug use: No    Sexual activity: Never   Lifestyle    Physical activity     Days per week: Not on file     Minutes per session: Not on file    Stress: Not on file   Relationships    Social connections     Talks on phone: Not on file     Gets together: Not on file     Attends Denominational service: Not on file     Active member of club or organization: Not on file     Attends meetings of clubs or organizations: Not on file     Relationship status: Not on file    Intimate partner violence     Fear of current or ex partner: Not on file     Emotionally abused: Not on file     Physically abused: Not on file     Forced sexual activity: Not on file   Other Topics Concern    Not on file   Social History Narrative    Not on file     Family History          Problem Relation Age of Onset    High Blood Pressure Mother     Asthma Mother     High Blood Pressure Father     Diabetes Maternal Grandmother      Sleep History    Never diagnosed with sleep apnea in the past.  Occupational history   Occupation:  She is current working: Yes  Type of profession: full time job doing . History of tobacco smoking:Yes  Amount of tobacco smokin.5 PPD. Years of tobacco smokin.                                    Quit smoking: Yes.               Quit OWEP:8464   Current smoker: No.         History of recreational or IV drug use in the past:NO     History of exposure to coal mines/coal dust: NO  History of exposure to foundry dust/welding: NO  History of exposure to quarry/silica/sandblasting: NO  History of exposure to asbestos/working with breaks/ships: NO  History of exposure to farm dust: NO  History of recent travel to long distances: NO  History of exposure to birds, pigeons, or chickens in the past:NO      History of pulmonary embolism in the past: No            History of DVT in the past:No          Vitals     height is 5' 8\" (1.727 m) and weight is 221 lb 14.4 oz (100.7 kg). Her oral temperature is 98.3 °F (36.8 °C). Her blood pressure is 134/66 and her pulse is 99. Her respiration is 20 and oxygen saturation is 98%. Body mass index is 33.74 kg/m². SUPPLEMENTAL O2: O2 Flow Rate (L/min): 2 L/min     I/O        Intake/Output Summary (Last 24 hours) at 8/17/2020 0912  Last data filed at 8/17/2020 0543  Gross per 24 hour   Intake 2866.61 ml   Output 2950 ml   Net -83.39 ml     I/O last 3 completed shifts: In: 2866.6 [P.O.:250; I.V.:2616.6]  Out: 2950 [Urine:2950]   No data found. Exam   Nursing note and vitals reviewed. Constitutional: Patient is oriented to person, place, and time. Patient appears well-developed and well-nourished. No distress on room air. Head: Normocephalic and atraumatic. Mouth/Throat: Oropharynx is clear and moist. No oropharyngeal exudate. No oral thrush. Eyes: Conjunctivae are normal. Pupils are equal, round, and reactive to light. Right eye exhibits no discharge. Left eye exhibits no discharge. No scleral icterus. Neck: Neck supple. No JVD present. No tracheal deviation present. No thyromegaly present. Cardiovascular: Normal rate, regular rhythm, normal heart sounds. Exam reveals no gallop and no friction rub. No murmur heard. Pulmonary/Chest: Effort normal. No stridor. No respiratory distress. No wheezes. No rales. Patient exhibits no tenderness. Decreased breath sounds bilateral lung bases. Abdominal: Soft. Bowel sounds are normal. Patient exhibits no distension and no mass. No tenderness. Patient has no rebound and no guarding. Musculoskeletal: Normal range of motion. Extremities: Patient exhibits no tenderness.  1+ Pneumonia Panel, Molecular - 8/8/2020   Source ET aspirates   Pseudomonas aeruginosa by PCR DetectedAbnormal      Culture, Urine - 8/8/2020   Urine Culture, Routine---No growth-preliminary No growth     Culture, Anaerobic and Aerobic - 8/8/2020   Organism Abnormal----Fusobacterium nucleatum     COVID-19 - 8/7/2020   SARS-CoV-2, NAAT NOT DETECTED     EKG       Echocardiogram   None in Epic. Radiology    CXR  8/15/2020   XR CHEST (2 VW)     Impression:  1. The right PICC is unchanged in position with the distal tip overlying the superior vena cava. 2. There are small bilateral pleural effusions. There is additional opacity at both lung bases which most commonly represents atelectasis and/or infiltrates. There is no pulmonary vascular congestion. CT Scans  (See actual reports for details)    8/7/2020   CT ABDOMEN PELVIS W IV CONTRAST   Impression:  Lingular pneumonia. Cholelithiasis. Large pelvic mass which is mildly inflamed likely relating to the left ovary. Benign and malignant etiologies are possible.          Assessment   -Acute on chronic respiratory failure due to postoperative sedation  -Possible obstructive sleep apnea due to increased neck size with a Mallampati class IV   -Chronic history of tobacco smoking  -COPD  -Left lower lobe atelectasis versus pneumonia  -Resolved septic shock  -Postoperative ileus  -S/p exploratory laparotomy with left salpingo-oophorectomy and small bowel exploration  Plan   -Incentive spirometry, CXR showed stable LLL minimal atelectasis   -CPAP with a pressure of 12 at night  -DuoNebs every 4 hours while patient is awake  -Titrate oxygen to SPO2 > 90%  -MetaNeb every 4 hours  -Home O2 evaluation  -Stiolto 2 inhalations/daily at the time of discharge  -Follow-up with Dr. Zander Mckeon at pulmonary outpatient clinic in 10-12 weeks  -Will schedule Conrad Baez for nocturnal polysomnogram (Sleep study) with split night protocol at [facility] sleep lab after

## 2020-08-17 NOTE — PROGRESS NOTES
[address] CCU 3A  Occupational Therapy  Daily Note  Time:   Time In: 2938  Time Out: 1420  Timed Code Treatment Minutes: 29 Minutes  Minutes: 29          Date: 2020  Patient Name: Anil Chappell,   Gender: female      Room: [facility]005-A  MRN: [id number]  : 1960  (62 y.o.)  Referring Practitioner: LAURE WRIGHT CNP  Diagnosis: ovarian mass  Additional Pertinent Hx: Per ER note on 2020:59 y.o. postmenopausal female with past medical history of COPD and chronic back pain who is being admitted for sepsis and pelvic mass. Ms. Lee Tena noticed a mild amount of painless vaginal bleeding that began on Tuesday of last week 20. This continued daily and worsened until the patient passed tissue and clots this  while using the restroom. The patient noted \"something was protruding from my vagina, so I pushed it back in\". The bleeding maintained and on 2020 the patient began having fevers at home. s/pLAPAROTOMY EXPLORATORY, LEFT SALPINGO OOPHORECTOMY, SMALL BOWEL EXPLORATION, irrigation of purulent material on 2020. Intubated  to 2020    Restrictions/Precautions:  Restrictions/Precautions: General Precautions, Fall Risk  Position Activity Restriction  Other position/activity restrictions: recent abdominal sx, son reports chronic back issues that Pt often braces self with transitions in mobility      SUBJECTIVE: Pt seated in bedside chair upon arrival, agreeable to OT session. Son present and encouraging throughout. Pt reported feeling as if she stayed in chair too long this date, reported weakness in LEs. PAIN: pt c/o pain in B thighs. COGNITION: WFL    ADL:   No ADL's completed this session. Cecy Guillen BALANCE:  Sitting Balance:  Stand By Assistance. Standing Balance: Contact Guard Assistance.       BED MOBILITY:  Sit to Supine: Minimal Assistance for lifting BLEs up into bed    TRANSFERS:  Sit to Stand:  [employer], with increased time for completion, cues for hand placement. from bedside chair  Stand to Sit: [employer]. to EOB    FUNCTIONAL MOBILITY:  Assistive Device: Rolling Walker  Assist Level:  Contact Guard Assistance. Distance: bedside chair>EOB  Slow pace, no LOB. Encouraged walking around bed to increase activity, although pt reported could not complete at this time due to pain/fatigue. ADDITIONAL ACTIVITIES:  Pt declined any further activities although questioned what she would be able to do on own in bed/in chair when therapy not present. Encouraged BUE/BLE exercises 2-3x/day (with demo provided) in addition to sitting up in chair 3x/day for meals. Encouraged to sit up in chair for 2-3 hours each session, and progress with walking as able. Encouraged pt to ask nursing staff for assisting to get up to chair 3x/day, as does not have to stand/walk with therapy only. ASSESSMENT:     Activity Tolerance:  Patient tolerance of  treatment: fair. Discharge Recommendations: Patient would benefit from continued therapy after discharge, Continue to assess pending progress(recommend continued therapy prior to returning home)    Equipment Recommendations:  Other: Monitor pending progress  Plan: Times per week: 5x  Current Treatment Recommendations: Functional Mobility Training, Balance Training, Endurance Training, Safety Education & Training, Self-Care / ADL    Patient Education  Patient Education: Plan of Care, Home Exercise Program and Importance of Increasing Activity    Goals  Short term goals  Time Frame for Short term goals: until discharge  Short term goal 1: Complete various sit-stand t/fs including toilet with CGA & 0 vcs for safety  Short term goal 2: Complete mobility to/from bathroom with RW, CGA, & 0-2 vcs for walker safety  Short term goal 3: Tolerate 3-4 min standing ADL task with CGA for increased ease of sinkside grooming tasks  Short term goal 4: Complete LE dressing with min A & LH AE prn  Long term goals  Time Frame for Long term goals : No LTG set d/t short ELOS    Following session, patient left in safe position with all fall risk precautions in place.

## 2020-08-18 ENCOUNTER — HOSPITAL ENCOUNTER (OUTPATIENT)
Age: 60
Discharge: HOME | End: 2020-08-27
Attending: INTERNAL MEDICINE | Admitting: INTERNAL MEDICINE
Payer: COMMERCIAL

## 2020-08-18 ENCOUNTER — APPOINTMENT (OUTPATIENT)
Dept: CT IMAGING | Age: 60
DRG: 853 | End: 2020-08-18
Payer: COMMERCIAL

## 2020-08-18 VITALS
TEMPERATURE: 98.6 F | DIASTOLIC BLOOD PRESSURE: 65 MMHG | BODY MASS INDEX: 33.86 KG/M2 | HEIGHT: 68 IN | SYSTOLIC BLOOD PRESSURE: 142 MMHG | RESPIRATION RATE: 24 BRPM | OXYGEN SATURATION: 91 % | HEART RATE: 107 BPM | WEIGHT: 223.4 LBS

## 2020-08-18 LAB
ANION GAP SERPL CALCULATED.3IONS-SCNC: 6 MEQ/L (ref 8–16)
BUN BLDV-MCNC: 22 MG/DL (ref 7–22)
CALCIUM IONIZED: 1.07 MMOL/L (ref 1.12–1.32)
CALCIUM SERPL-MCNC: 7.6 MG/DL (ref 8.5–10.5)
CHLORIDE BLD-SCNC: 101 MEQ/L (ref 98–111)
CO2: 30 MEQ/L (ref 23–33)
CREAT SERPL-MCNC: 0.4 MG/DL (ref 0.4–1.2)
ERYTHROCYTE [DISTWIDTH] IN BLOOD BY AUTOMATED COUNT: 14.3 % (ref 11.5–14.5)
ERYTHROCYTE [DISTWIDTH] IN BLOOD BY AUTOMATED COUNT: 47.3 FL (ref 35–45)
GFR SERPL CREATININE-BSD FRML MDRD: > 90 ML/MIN/1.73M2
GLUCOSE BLD-MCNC: 130 MG/DL (ref 70–108)
GLUCOSE BLD-MCNC: 133 MG/DL (ref 70–108)
GLUCOSE BLD-MCNC: 133 MG/DL (ref 70–108)
GLUCOSE BLD-MCNC: 144 MG/DL (ref 70–108)
HCT VFR BLD CALC: 30.4 % (ref 37–47)
HEMOGLOBIN: 9.4 GM/DL (ref 12–16)
MAGNESIUM: 2 MG/DL (ref 1.6–2.4)
MCH RBC QN AUTO: 30 PG (ref 26–33)
MCHC RBC AUTO-ENTMCNC: 30.9 GM/DL (ref 32.2–35.5)
MCV RBC AUTO: 97.1 FL (ref 81–99)
PHOSPHORUS: 2.4 MG/DL (ref 2.4–4.7)
PLATELET # BLD: 325 THOU/MM3 (ref 130–400)
PMV BLD AUTO: 10.8 FL (ref 9.4–12.4)
POTASSIUM SERPL-SCNC: 4.5 MEQ/L (ref 3.5–5.2)
RBC # BLD: 3.13 MILL/MM3 (ref 4.2–5.4)
SODIUM BLD-SCNC: 137 MEQ/L (ref 135–145)
WBC # BLD: 14.1 THOU/MM3 (ref 4.8–10.8)

## 2020-08-18 PROCEDURE — 84100 ASSAY OF PHOSPHORUS: CPT

## 2020-08-18 PROCEDURE — 2580000003 HC RX 258: Performed by: OBSTETRICS & GYNECOLOGY

## 2020-08-18 PROCEDURE — 94761 N-INVAS EAR/PLS OXIMETRY MLT: CPT

## 2020-08-18 PROCEDURE — 97530 THERAPEUTIC ACTIVITIES: CPT

## 2020-08-18 PROCEDURE — 6370000000 HC RX 637 (ALT 250 FOR IP): Performed by: INTERNAL MEDICINE

## 2020-08-18 PROCEDURE — 82330 ASSAY OF CALCIUM: CPT

## 2020-08-18 PROCEDURE — 97110 THERAPEUTIC EXERCISES: CPT

## 2020-08-18 PROCEDURE — 99239 HOSP IP/OBS DSCHRG MGMT >30: CPT | Performed by: INTERNAL MEDICINE

## 2020-08-18 PROCEDURE — 97535 SELF CARE MNGMENT TRAINING: CPT

## 2020-08-18 PROCEDURE — C9113 INJ PANTOPRAZOLE SODIUM, VIA: HCPCS | Performed by: INTERNAL MEDICINE

## 2020-08-18 PROCEDURE — 83735 ASSAY OF MAGNESIUM: CPT

## 2020-08-18 PROCEDURE — 6360000002 HC RX W HCPCS: Performed by: INTERNAL MEDICINE

## 2020-08-18 PROCEDURE — 74177 CT ABD & PELVIS W/CONTRAST: CPT

## 2020-08-18 PROCEDURE — 6370000000 HC RX 637 (ALT 250 FOR IP): Performed by: OBSTETRICS & GYNECOLOGY

## 2020-08-18 PROCEDURE — 82948 REAGENT STRIP/BLOOD GLUCOSE: CPT

## 2020-08-18 PROCEDURE — 2500000003 HC RX 250 WO HCPCS: Performed by: OBSTETRICS & GYNECOLOGY

## 2020-08-18 PROCEDURE — 97116 GAIT TRAINING THERAPY: CPT

## 2020-08-18 PROCEDURE — 94669 MECHANICAL CHEST WALL OSCILL: CPT

## 2020-08-18 PROCEDURE — 2500000003 HC RX 250 WO HCPCS: Performed by: PHARMACIST

## 2020-08-18 PROCEDURE — 6360000004 HC RX CONTRAST MEDICATION: Performed by: OBSTETRICS & GYNECOLOGY

## 2020-08-18 PROCEDURE — 99232 SBSQ HOSP IP/OBS MODERATE 35: CPT | Performed by: INTERNAL MEDICINE

## 2020-08-18 PROCEDURE — 80048 BASIC METABOLIC PNL TOTAL CA: CPT

## 2020-08-18 PROCEDURE — 85027 COMPLETE CBC AUTOMATED: CPT

## 2020-08-18 PROCEDURE — 99024 POSTOP FOLLOW-UP VISIT: CPT | Performed by: SURGERY

## 2020-08-18 PROCEDURE — 6360000002 HC RX W HCPCS: Performed by: OBSTETRICS & GYNECOLOGY

## 2020-08-18 PROCEDURE — 2580000003 HC RX 258: Performed by: INTERNAL MEDICINE

## 2020-08-18 RX ORDER — PANTOPRAZOLE SODIUM 40 MG/10ML
40 INJECTION, POWDER, LYOPHILIZED, FOR SOLUTION INTRAVENOUS DAILY
Status: DISCONTINUED | OUTPATIENT
Start: 2020-08-18 | End: 2020-08-18 | Stop reason: HOSPADM

## 2020-08-18 RX ORDER — FUROSEMIDE 10 MG/ML
40 INJECTION INTRAMUSCULAR; INTRAVENOUS DAILY
Status: DISCONTINUED | OUTPATIENT
Start: 2020-08-19 | End: 2020-08-18 | Stop reason: HOSPADM

## 2020-08-18 RX ADMIN — ONDANSETRON 4 MG: 2 INJECTION INTRAMUSCULAR; INTRAVENOUS at 09:54

## 2020-08-18 RX ADMIN — IOPAMIDOL 80 ML: 755 INJECTION, SOLUTION INTRAVENOUS at 08:37

## 2020-08-18 RX ADMIN — METOCLOPRAMIDE 10 MG: 5 INJECTION, SOLUTION INTRAMUSCULAR; INTRAVENOUS at 08:14

## 2020-08-18 RX ADMIN — IPRATROPIUM BROMIDE AND ALBUTEROL SULFATE 1 AMPULE: .5; 3 SOLUTION RESPIRATORY (INHALATION) at 12:22

## 2020-08-18 RX ADMIN — FUROSEMIDE 40 MG: 10 INJECTION, SOLUTION INTRAMUSCULAR; INTRAVENOUS at 09:54

## 2020-08-18 RX ADMIN — METRONIDAZOLE 500 MG: 500 INJECTION, SOLUTION INTRAVENOUS at 10:38

## 2020-08-18 RX ADMIN — IPRATROPIUM BROMIDE AND ALBUTEROL SULFATE 1 AMPULE: .5; 3 SOLUTION RESPIRATORY (INHALATION) at 16:35

## 2020-08-18 RX ADMIN — SODIUM CHLORIDE, PRESERVATIVE FREE 10 ML: 5 INJECTION INTRAVENOUS at 08:14

## 2020-08-18 RX ADMIN — CEFEPIME 2 G: 2 INJECTION, POWDER, FOR SOLUTION INTRAMUSCULAR; INTRAVENOUS at 04:39

## 2020-08-18 RX ADMIN — METRONIDAZOLE 500 MG: 500 INJECTION, SOLUTION INTRAVENOUS at 00:04

## 2020-08-18 RX ADMIN — DILTIAZEM HYDROCHLORIDE 30 MG: 30 TABLET, FILM COATED ORAL at 16:38

## 2020-08-18 RX ADMIN — DILTIAZEM HYDROCHLORIDE 30 MG: 30 TABLET, FILM COATED ORAL at 04:39

## 2020-08-18 RX ADMIN — FAMOTIDINE 20 MG: 20 TABLET, FILM COATED ORAL at 09:54

## 2020-08-18 RX ADMIN — PIPERACILLIN AND TAZOBACTAM 3.38 G: 3; .375 INJECTION, POWDER, LYOPHILIZED, FOR SOLUTION INTRAVENOUS at 14:48

## 2020-08-18 RX ADMIN — CALCIUM GLUCONATE: 98 INJECTION, SOLUTION INTRAVENOUS at 18:20

## 2020-08-18 RX ADMIN — DILTIAZEM HYDROCHLORIDE 30 MG: 30 TABLET, FILM COATED ORAL at 09:54

## 2020-08-18 RX ADMIN — PANTOPRAZOLE SODIUM 40 MG: 40 INJECTION, POWDER, FOR SOLUTION INTRAVENOUS at 14:49

## 2020-08-18 RX ADMIN — MICONAZOLE NITRATE: 20 POWDER TOPICAL at 09:54

## 2020-08-18 RX ADMIN — ONDANSETRON 4 MG: 2 INJECTION INTRAMUSCULAR; INTRAVENOUS at 00:04

## 2020-08-18 ASSESSMENT — PAIN DESCRIPTION - PAIN TYPE: TYPE: ACUTE PAIN

## 2020-08-18 ASSESSMENT — PAIN DESCRIPTION - LOCATION
LOCATION: ABDOMEN

## 2020-08-18 ASSESSMENT — PAIN SCALES - GENERAL
PAINLEVEL_OUTOF10: 0
PAINLEVEL_OUTOF10: 8
PAINLEVEL_OUTOF10: 8
PAINLEVEL_OUTOF10: 0
PAINLEVEL_OUTOF10: 8

## 2020-08-18 ASSESSMENT — PAIN DESCRIPTION - FREQUENCY: FREQUENCY: CONTINUOUS

## 2020-08-18 ASSESSMENT — PAIN DESCRIPTION - DESCRIPTORS
DESCRIPTORS: ACHING
DESCRIPTORS: ACHING

## 2020-08-18 ASSESSMENT — PAIN DESCRIPTION - ORIENTATION
ORIENTATION: LOWER

## 2020-08-18 ASSESSMENT — PAIN DESCRIPTION - ONSET: ONSET: ON-GOING

## 2020-08-18 NOTE — FLOWSHEET NOTE
08/18/20 1002   Provider Notification   Reason for Communication Evaluate   Provider Name Dr. Sylwia Enamorado   Provider Notification Physician   Method of Communication Secure Message   Notification Time [phone]   Notified doctor of CT abdomen impression and that the patient's son is interested in talking to her.

## 2020-08-18 NOTE — FLOWSHEET NOTE
08/18/20 1552   Provider Notification   Reason for Communication Evaluate   Provider Name Dr. Jasmyne Baker   Provider Notification Physician   Method of Communication Call   Notification Time [phone]   Verifying doctor is ok with discharge to [place]. Doctor states this is ok.

## 2020-08-18 NOTE — PROGRESS NOTES
Ob/Gyn  Attending Progress Note    HD #11      SUBJECTIVE:  Events of the last 24 hours. Passing gas this am. No vomiting. Still with some pain. Breathing comfortably speaking with ease. Reports feeling better each day. Eating very small amounts but with pain.     OBJECTIVE:    Current Medications:  Current Facility-Administered Medications: PN-Adult  3 IN 1 Central Line (Custom), , Intravenous, Continuous TPN  famotidine (PEPCID) tablet 20 mg, 20 mg, Oral, BID  lactobacillus (CULTURELLE) capsule 1 capsule, 1 capsule, Oral, TID WC  miconazole (MICOTIN) 2 % powder, , Topical, BID  furosemide (LASIX) injection 40 mg, 40 mg, Intravenous, BID  polyethylene glycol (GLYCOLAX) packet 17 g, 17 g, Oral, Daily  metoclopramide (REGLAN) injection 10 mg, 10 mg, Intravenous, Q12H  dilTIAZem (CARDIZEM) tablet 30 mg, 30 mg, Oral, 4 times per day  phosphorus replacement protocol, , Other, RX Placeholder  magnesium replacement protocol, , Other, RX Placeholder  lidocaine PF 1 % injection 5 mL, 5 mL, Intradermal, Once  ipratropium-albuterol (DUONEB) nebulizer solution 1 ampule, 1 ampule, Inhalation, Q4H WA  albuterol sulfate  (90 Base) MCG/ACT inhaler 2 puff, 2 puff, Inhalation, Q6H PRN  morphine (PF) injection 2 mg, 2 mg, Intravenous, Q2H PRN **OR** morphine injection 4 mg, 4 mg, Intravenous, Q2H PRN  insulin lispro (HUMALOG) injection vial 0-6 Units, 0-6 Units, Subcutaneous, Q6H  bisacodyl (DULCOLAX) suppository 10 mg, 10 mg, Rectal, Daily PRN  [Held by provider] HYDROcodone-acetaminophen (NORCO) 7.5-325 MG per tablet 1 tablet, 1 tablet, Oral, Q6H PRN  fentaNYL (SUBLIMAZE) injection 25 mcg, 25 mcg, Intravenous, Q4H PRN  acetaminophen (TYLENOL) tablet 650 mg, 650 mg, Oral, Q4H PRN **OR** acetaminophen (TYLENOL) suppository 650 mg, 650 mg, Rectal, Q4H PRN  calcium replacement protocol, , Other, RX Placeholder  potassium chloride 10 mEq/100 mL IVPB (Peripheral Line), 10 mEq, Intravenous, PRN  potassium chloride 20 mEq/50 mL IVPB (Central Line), 20 mEq, Intravenous, PRN  potassium chloride (KLOR-CON M) extended release tablet 40 mEq, 40 mEq, Oral, PRN **OR** potassium bicarb-citric acid (EFFER-K) effervescent tablet 40 mEq, 40 mEq, Oral, PRN **OR** potassium chloride 10 mEq/100 mL IVPB (Peripheral Line), 10 mEq, Intravenous, PRN  glucose (GLUTOSE) 40 % oral gel 15 g, 15 g, Oral, PRN  dextrose 50 % IV solution, 12.5 g, Intravenous, PRN  glucagon (rDNA) injection 1 mg, 1 mg, Intramuscular, PRN  dextrose 5 % solution, 100 mL/hr, Intravenous, PRN  sodium chloride flush 0.9 % injection 10 mL, 10 mL, Intravenous, 2 times per day  sodium chloride flush 0.9 % injection 10 mL, 10 mL, Intravenous, PRN  promethazine (PHENERGAN) tablet 12.5 mg, 12.5 mg, Oral, Q6H PRN **OR** ondansetron (ZOFRAN) injection 4 mg, 4 mg, Intravenous, Q6H PRN  enoxaparin (LOVENOX) injection 40 mg, 40 mg, Subcutaneous, Daily  cefepime (MAXIPIME) 2 g IVPB minibag, 2 g, Intravenous, Q12H  metronidazole (FLAGYL) 500 mg in NaCl 100 mL IVPB premix, 500 mg, Intravenous, Q8H    Physical:   VITALS:  /62   Pulse 102   Temp 99.2 °F (37.3 °C) (Oral)   Resp 20   Ht 5' 8\" (1.727 m)   Wt 223 lb 6.4 oz (101.3 kg)   SpO2 91%   BMI 33.97 kg/m²   CURRENT TEMPERATURE:  Temp: 99.2 °F (37.3 °C)  TEMPERATURE RANGE OVER 24HRS:   Temp  Av.8 °F (37.1 °C)  Min: 98 °F (36.7 °C)  Max: 99.6 °F (37.6 °C)  24HR RESPIRATORY RATE RANGE:  Resp  Av.8  Min: 16  Max: 26  24HR PULSE RANGE: Pulse  Av.7  Min: 101  Max: 105  24HR BLOOD PRESSURE RANGE:  Systolic (06RFC), CVY:111 , Min:119 , YPY:027   ; Diastolic (62VCS), KSQ:99, Min:58, Max:86    24HR INTAKE/OUTPUT:      Intake/Output Summary (Last 24 hours) at 2020  Last data filed at 2020 0341  Gross per 24 hour   Intake 2105.69 ml   Output 3100 ml   Net -994.31 ml           ABDOMEN:  Softly distended. Incision c/d/i sm amount of serous drainage.      MUSCULOSKELETAL:  there is no redness, warmth, or swelling of the joints    Data:  CBC:   Lab Results   Component Value Date    WBC 14.1 08/18/2020    RBC 3.13 08/18/2020    HGB 9.4 08/18/2020    HCT 30.4 08/18/2020    MCV 97.1 08/18/2020    RDW 13.2 03/05/2018     08/18/2020     BMP:    Lab Results   Component Value Date     08/18/2020    K 4.5 08/18/2020    K 3.5 08/08/2020     08/18/2020    CO2 30 08/18/2020    BUN 22 08/18/2020    CREATININE 0.4 08/18/2020    CALCIUM 7.6 08/18/2020    LABGLOM >90 08/18/2020    GLUCOSE 133 08/18/2020     Urine Culture:  neg  Blood Culture: neg  Peritoneal culture fusobacterium  Sputum Culture:  No components found for: CSPUTUM  CVP Mean:      ICU guidelines/prophylaxis active for the following:    insulin guidelines, DVT prophylaxis and ulcer prophylaxis    ASSESSMENT & PLAN:    Active Problems:  Day 11 S/P exp lap for TOA. ID -Afebrile and WBC down today. Continue abx at [place] to day 14  Continue metronidazole and Maxipime. WBC 14,       Bowel function - Improved but with \"indigestion\" . Hamilton Paulino Will try toast as clears are not appetizing to her  Nutrition -Would continue TPN as she is not advancing diet yet. Resp off O2 and getting resp treatments    When Bowel fxn returns we can transition to PO antibiotics flagyl and doxy. Discharge will depend on functional status once bowel function returns. . Home health vs home with son. Doubt long term need for [place] unless functional status does not improve over the next few days. Adequate Urine output. Increase activity./ Physical therapy  Remove olivera. CT just resulted and no evidence of abcsess formation.         Safia Boles 8/18/2020 9:08 AM

## 2020-08-18 NOTE — FLOWSHEET NOTE
08/18/20 1553   Provider Notification   Reason for Communication Evaluate   Provider Name Kulwant Koenig   Provider Notification Advance Practice Clinician (CNS, NP, CNM, CRNA, PA)   Method of Communication Secure Message   Notification Time [phone]   Verifying pulmonology team is ok with discharge. Waiting for response.

## 2020-08-18 NOTE — PROGRESS NOTES
TPN Follow Up Note    Assessment: Diet general, but patient only eating about 25%. Patient still with significant nausea, requiring Zofran routinely. CT of abdomen still showing ileus. Electrolyte Replacement: None    TPN changes for (today) at 1800: No changes. Re-check BMP, Mg, PO4, iCa on Thursday 8/20/2020.     Oh Chase, PharmD   8/18/2020, 10:33 AM

## 2020-08-18 NOTE — CARE COORDINATION
8/18/20, 12:08 PM EDT    DISCHARGE PLANNING EVALUATION    PC received from Lorie Cervantes with [facility], update provided. Lorie Cervantes will call PCP Dr Jamie Fabry to follow pt post discharge. SW spoke with therapy, states it took 26 minutes to get pt up to chair and pivot back to bed. Therapy recommending inpt rehab/ecf at this time. SW spoke with pt, states she is not sure what she wants to do and requested to speak with son prior to making a decision. Pt did give sw permission to speak with son over the phone. SW called son to discuss recommendation from therapy, son states he doesn't feel pt is ready for discharge and may be stronger when she is ready. Son is aware of options however, he is not ready to make a decision. SW suggested we monitor pts progress with therapy discuss plan in a day or two. Son agreed.

## 2020-08-18 NOTE — PROGRESS NOTES
[address] [address]  INPATIENT PHYSICAL THERAPY  DAILY NOTE  [facility] CCU 3A - 3A-05/005-A      Time In: 3611  Time Out: 1114  Timed Code Treatment Minutes: 44 Minutes  Minutes: 39          Date: 2020  Patient Name: Nette Rivera,  Gender:  female        MRN: [id number]  : 1960  (62 y.o.)     Referring Practitioner: Mary Anne Martinez CNP  Diagnosis: ovarian mass  Additional Pertinent Hx: Per ER note on 2020:59 y.o. postmenopausal female with past medical history of COPD and chronic back pain who is being admitted for sepsis and pelvic mass. Ms. Nato Manley noticed a mild amount of painless vaginal bleeding that began on Tuesday of last week 20. This continued daily and worsened until the patient passed tissue and clots this  while using the restroom. The patient noted \"something was protruding from my vagina, so I pushed it back in\". The bleeding maintained and on 2020 the patient began having fevers at home. s/pLAPAROTOMY EXPLORATORY, LEFT SALPINGO OOPHORECTOMY, SMALL BOWEL EXPLORATION, irrigation of purulent material on 2020. Intubated  to 2020     Prior Level of Function:  Lives With: Alone  Type of Home: House  Home Layout: Two level, Bed/Bath upstairs  Home Access: Stairs to enter with rails(3)  Entrance Stairs - Number of Steps: 4   Bathroom Shower/Tub: Tub/Shower unit  Bathroom Toilet: Standard  Bathroom Equipment: Grab bars in shower    ADL Assistance: [address]: Independent  Transfer Assistance: Independent  Additional Comments: Pt reports fully indep PLOF.     Restrictions/Precautions:  Restrictions/Precautions: General Precautions, Fall Risk  Position Activity Restriction  Other position/activity restrictions: recent abdominal sx, son reports chronic back issues that Pt often braces self with transitions in mobility    SUBJECTIVE: pts son present at start of session and was supportive, pt more jovial and talktive this date she cont to require encouragement for increased activity as she was asking for the bed pan, she did have to use bathroom 2 times during session   PAIN: no number given she c/o of min abd discomfort    OBJECTIVE:  Bed Mobility:  Not Tested    Transfers:  Sit to Stand: Contact Guard Assistance, to min assist she completed several times during session   Stand to [facility] 68, to min assist, pt needed cues each time to back all the way up and to reach back     Ambulation:  Contact Guard Assistance  Distance: 15x4  Surface: Level Tile  Device:Rolling Walker  Gait Deviations:  Slow Nicole and cues to stay closer to the walker she did c/o of fatigue at this distance and needed cues for safety when backing upto surface because she was in a hurry to sit down     Balance:  pt stood at walker with CGA she was able to release with one UE at support wtih CGA but required assist to complete the mary care     Exercise:  Patient was guided in  set(s) 1 reps of exercise to both lower extremities. Ankle pumps, Glut sets, Quad sets, Heelslides, Hip abduction/adduction, Seated marches, Long arc quads and and issued her a HEP to complete during the day . Exercises were completed for increased independence with functional mobility. Functional Outcome Measures: Completed  AM-PAC Inpatient Mobility Raw Score : 14  AM-PAC Inpatient T-Scale Score : 38.1    ASSESSMENT:  Assessment: Patient progressing toward established goals. and pt was able to tolerate increased activity however she cont to be very weak and tolerated limited walking distance, pt would benefit from cont skilled therapy to work on strength, balance, endurance and increased independence with functional mobility   Activity Tolerance:  Patient tolerance of  treatment: fair.         Equipment Recommendations:Equipment Needed: anticipate pt will need a rolling walker and commode for home going   Discharge Recommendations:    (pt

## 2020-08-18 NOTE — PLAN OF CARE
Problem: Impaired respiratory status  Goal: Clear lung sounds  8/18/2020 1657 by Kylie Stone, JAISON  Outcome: Ongoing    Improve breath sounds, increase aeration and decrease WOB.

## 2020-08-18 NOTE — PROGRESS NOTES
Hospitalist Progress Note      Patient:  Bismark Quezadadmont    Unit/Bed:3A-05/005-A  YOB: 1960  MRN: [id number]   Acct: [de-identified]   PCP: Mamadou Mendez MD  Date of Admission: 8/7/2020    Assessment/Plan:    1. Acute hypoxic respiratory failure likely due to postop sedation, likely also has underlying CHICO. - Patient extubated on 8/11/2020 after requiring intubation for surgical procedure. - Patient continues to require oxygen to maintain adequate O2 saturations, currently on 1L via nasal cannula. - CPAP overnight  - breathing treatment  - Still + 12L fluid balance. Continue IV diuresis. Cut it down to IV lasix daily    2. Large left adnexal abscess: S/p exploratory laparotomy with left salpingo oophorectomy, small bowel exploration, and irrigation of purulent material on 8/8/2024 ovarian abscess per Dr. Keegan Azar. - On IV cefepime and Flagyl (Day #11). -We will change her antibiotics to IV Zosyn as patient is not tolerating Flagyl. She keeps on having severe nausea every time she eats or tries to drink. She denies any dysphagia but rather states that food and drink gives metallic taste which makes her nauseated and had episodes of vomiting as well. She is already on bowel regimen. Had a repeat CT abdomen with IV contrast that showed surgical changes and mod ileus. We will continue Reglan. Of note she did have very small bowel movement yesterday. Changed to IV Protonix. I dont suspect there is a mechanical etiology for her current symptoms. I suspect this is still related to Flagyl. We will continue to monitor with changing IV antibiotics. 3. Acute normocytic anemia: Improving. Current H&H 10/33.3 with an MCV of 99.7. 4. Postoperative ileus: Passing gas. Unknown last BM, but abdo is soft, NT. Advance diet per surgery. Continue reglan. 5. Leukocytosis: improving. Continue IV antibiotics.     6. GERD: Continue Pepcid. 7. Physical deconditioning: Secondary to above issues. PT/OT following, recommendations for continued therapy after discharge noted, appreciate assistance. 8. Obesity: BMI 33.74    9. Malnutrition   - Currently on TPN    10. Hepatic steatosis    Chief Complaint: Abdominal pain, hypotension    Initial H and P:-    **Per Chart Review:  Dayton Gay is a 62year old  female transferred to [facility] ICU 8/8/2020 with hypotension and abdominal pain. Patient has a significant history of former smoker-quit 2019, COPD, impaired glucose tolerance, GERD, chronic back pain, headaches, and obesity. Estrella presented to [facility] ER with complaint of abdominal pain, fever, vaginal bleeding.  Patient identifies onset vaginal bleeding with passage of clots, averaging 3-4 pads/day. Onset proximately 2 weeks ago. [facility] also identifies a fever of 102.9 that started approximately 2 days ago with complaints of lower abdominal pain. [facility] she noted a mass coming out of her vagina and tried to pull it out unsuccessfully secondary to pain.  8/7/2020 transvaginal ultrasound noted large complex lesion in the left ovary could relate to abscess or hemorrhagic neoplasm.  CT abdomen pelvis with IV contrast mild groundglass airspace opacities within the lingula suggest pneumonia.  Mild fatty infiltration of liver, multiple gallstones biliary.  A spiculated cystic mass within the left pelvis.  Large pelvic mass which is mildly inflamed likely related to the left ovary. Evaluation occurred by OB/GYN Dr. Boles, an Endocervical polyp was removed and sent for pathology.  Patient was sent to [facility] ICU stepdown. 8/8/2020 I was called to evaluate patient secondary to hypotension.  Patient was not responsive to 2 L of 0.9 normal saline systolic blood pressure remained 68-74 with a heart rate of 120.  Levophed drip was started and patient was admitted to the ICU for further evaluation and care.     Subjective (past 24 hours): Patient still complaining of nausea every time she tries to eat. She complains of metallic taste in her mouth. She still passing gas. Had a small bowel movement yesterday. Also had some vomiting yesterday. Still not able to tolerate oral diet. Past medical history, family history, social history and allergies reviewed again and is unchanged since admission. ROS (14 point review of systems completed. Pertinent positives noted. Otherwise ROS is negative) :  GENERAL: No fever,chills, or night sweats. Endorses fatigue, malaise. SKIN: No lesions or rashes. HEAD: No headaches or recent injury. EYES: No acute changes in vision, no diplopia or blurred vision. EARS: No hearing loss, no tinnitus. NOSE/THROAT: No rhinorrhea or pharyngitis, no nasal drainage. NECK: No lumps or unusual neck stiffness. PULMONARY: Respirations easy and non-labored, no acute distress. CARDIAC: No chest pain, pressure, Negative for lower leg edema. GI: Abdomen is soft and non-tender, non-distended. PERIPHERAL VASCULAR: No intermittent claudication or unusual leg cramps. MUSCULOSKELETAL: Occasional arthralgias, myalgias. NEUROLOGICAL: Denies any headache, near syncope, seizures or syncope. HEMATOLOGIC:  No unusual bruising or bleeding. PSYCH: Denies any homicidal or suicidial ideations.     Medications:  Reviewed    Infusion Medications    PN-Adult  3 IN 1 Central Line (Custom)      PN-Adult  3 IN 1 Central Line (Custom) 75 mL/hr at 08/17/20 1738    dextrose       Scheduled Medications    pantoprazole  40 mg Intravenous Daily    piperacillin-tazobactam  3.375 g Intravenous Q8H    lactobacillus  1 capsule Oral TID WC    miconazole   Topical BID    furosemide  40 mg Intravenous BID    polyethylene glycol  17 g Oral Daily    metoclopramide  10 mg Intravenous Q12H    dilTIAZem  30 mg Oral 4 times per day    phosphorus replacement protocol   Other RX Placeholder    magnesium replacement protocol   Other RX Placeholder    lidocaine 1 % injection  5 mL Intradermal Once    ipratropium-albuterol  1 ampule Inhalation Q4H WA    insulin lispro  0-6 Units Subcutaneous Q6H    calcium replacement protocol   Other RX Placeholder    sodium chloride flush  10 mL Intravenous 2 times per day    enoxaparin  40 mg Subcutaneous Daily     PRN Meds: albuterol sulfate HFA, morphine **OR** morphine, bisacodyl, [Held by provider] HYDROcodone-acetaminophen, fentanNYL, acetaminophen **OR** acetaminophen, potassium chloride, potassium chloride, potassium chloride **OR** potassium alternative oral replacement **OR** potassium chloride, glucose, dextrose, glucagon (rDNA), dextrose, sodium chloride flush, promethazine **OR** ondansetron      Intake/Output Summary (Last 24 hours) at 8/18/2020 1418  Last data filed at 8/18/2020 1214  Gross per 24 hour   Intake 2045.69 ml   Output 2950 ml   Net -904.31 ml       Diet:  DIET GENERAL;  PN-Adult  3 IN 1 Central Line (Custom)  PN-Adult  3 IN 1 Central Line (Custom)    Exam:  BP (!) 147/77   Pulse 101   Temp 98.8 °F (37.1 °C) (Oral)   Resp 25   Ht 5' 8\" (1.727 m)   Wt 223 lb 6.4 oz (101.3 kg)   SpO2 93%   BMI 33.97 kg/m²     General appearance: Alert and appropriate, acutely ill-appearing pleasant obese female. No apparent distress, appears stated age and cooperative. Patient does appear fatigued. HEENT: Pupils equal, round, and reactive to light. Conjunctivae/corneas clear. Neck: Supple, with full range of motion. No jugular venous distention. Trachea midline. Respiratory:  Normal respiratory effort. Clear to auscultation, bilaterally without Rales/Wheezes/Rhonchi. Cardiovascular: Regular rate and rhythm with normal S1/S2 without murmurs, rubs or gallops. Abdomen: Soft, non-tender, non-distended with normal bowel sounds. Surgical site dry without signs of infection. Musculoskeletal: Passive and active ROM x 4 extremities.   Skin: Skin color, texture, turgor normal.  No rashes or lesions. Neurologic:  Neurovascularly intact without any focal sensory/motor deficits. Cranial nerves: II-XII intact, grossly non-focal.  Psychiatric: Alert and oriented to person, place, time, and situation. Thought content appropriate, normal insight  Capillary Refill: Brisk,< 3 seconds   Peripheral Pulses: +2 palpable, equal bilaterally     Labs:   Recent Labs     08/17/20  0336 08/18/20  0335   WBC 18.6* 14.1*   HGB 9.6* 9.4*   HCT 31.5* 30.4*   * 325     Recent Labs     08/16/20  0413 08/17/20  0336 08/18/20  0335    132* 137   K 4.6 4.6 4.5    98 101   CO2 29 28 30   BUN 27* 26* 22   CREATININE 0.3* 0.3* 0.4   CALCIUM 7.4* 7.5* 7.6*   PHOS 2.2* 3.2 2.4     No results for input(s): AST, ALT, BILIDIR, BILITOT, ALKPHOS in the last 72 hours. No results for input(s): INR in the last 72 hours. No results for input(s): Leellen Carota in the last 72 hours. Microbiology:    Blood culture #1:   Lab Results   Component Value Date    BC No growth-preliminary No growth  08/07/2020       Blood culture #2:No results found for: Yue Shaunna    Organism:  Lab Results   Component Value Date    ORG Fusobacterium nucleatum 08/08/2020         Lab Results   Component Value Date    LABGRAM  08/08/2020     Rare segmented neutrophils observed. No organisms observed.         MRSA culture only:No results found for: [facility]    Urine culture:   Lab Results   Component Value Date    LABURIN No growth-preliminary No growth  08/08/2020       Respiratory culture: No results found for: CULTRESP    Aerobic and Anaerobic :  Lab Results   Component Value Date    LABAERO No growth-preliminary No growth  08/08/2020     Lab Results   Component Value Date    LABANAE light growth beta-lactamase negative  08/08/2020       Urinalysis:      Lab Results   Component Value Date    NITRU NEGATIVE 08/08/2020    WBCUA 5-9 08/08/2020    BACTERIA NONE SEEN 08/08/2020    RBCUA 50-75 08/08/2020    BLOODU MODERATE 08/08/2020    SPECGRAV 1.015 11/09/2019    GLUCOSEU NEGATIVE 08/08/2020       Radiology:  CT ABDOMEN PELVIS W IV CONTRAST Additional Contrast? None   Final Result   1. Bilateral pleural effusions. Bibasilar atelectasis/pneumonia. 2. Postoperative changes in the pelvis. . Small amount of ascites in the abdomen and pelvis. Findings of abdominal and pelvic subcutaneous edema/anasarca. 3. Cholelithiasis. Hepatic steatosis. Findings of moderate postoperative ileus. **This report has been created using voice recognition software. It may contain minor errors which are inherent in voice recognition technology. **      Final report electronically signed by Dr. Amy Martel on 8/18/2020 8:56 AM      XR CHEST (2 VW)   Final Result   1. The right PICC is unchanged in position with the distal tip overlying the superior vena cava. 2. There are small bilateral pleural effusions. There is additional opacity at both lung bases which most commonly represents atelectasis and/or infiltrates. There is no pulmonary vascular congestion. **This report has been created using voice recognition software. It may contain minor errors which are inherent in voice recognition technology. **      Final report electronically signed by Dr. Casey Cormier on 8/15/2020 9:08 AM      XR CHEST PORTABLE   Final Result   Minimal opacity in the left lung base possible atelectasis, minimal infiltrate or effusion. **This report has been created using voice recognition software. It may contain minor errors which are inherent in voice recognition technology. **      Final report electronically signed by Dr. Verlean Sicard on 8/13/2020 2:13 PM      XR ABDOMEN (KUB) (SINGLE AP VIEW)   Final Result   Increased gaseous distention throughout the small bowel loops. This is likely related to postsurgical ileus although a bowel obstruction can't be excluded. **This report has been created using voice recognition software.  It may contain minor errors which are inherent in voice recognition technology. **      Final report electronically signed by Dr Edd Esteves on 8/12/2020 9:16 AM      XR CHEST PORTABLE   Final Result      Improved lateral left basilar infiltrate/atelectasis/small left pleural effusion. Mild atelectasis at the medial right lung base. Possible left hilar adenopathy which appears to be mildly improved. **This report has been created using voice recognition software. It may contain minor errors which are inherent in voice recognition technology. **      Final report electronically signed by Dr. Laureano Escalante on 8/10/2020 5:19 AM      XR ABDOMEN (KUB) (SINGLE AP VIEW)   Final Result      Non obstructive bowel gas pattern. No evidence of an ileus. **This report has been created using voice recognition software. It may contain minor errors which are inherent in voice recognition technology. **      Final report electronically signed by Dr. Laureano Escalante on 8/10/2020 3:26 AM      XR CHEST PORTABLE   Final Result   ET node G-tube positions as above. Left lower lobe atelectasis or infiltrate. **This report has been created using voice recognition software. It may contain minor errors which are inherent in voice recognition technology. **      Final report electronically signed by Dr. Jayden Castañeda on 8/8/2020 9:54 AM      XR CHEST PORTABLE   Final Result      Interval placement of a right jugular central venous catheter, tip in the mid SVC, no pneumothorax. New opacity at the lateral left lung base which may represent a small infiltrate, atelectasis or small left pleural effusion. Interstitial prominence, acute versus chronic. See discussion above and correlate clinically. **This report has been created using voice recognition software. It may contain minor errors which are inherent in voice recognition technology. **      Final report electronically signed by Dr. Laureano Escalante on 8/8/2020 5:53 AM      XR INFORMATION: cervical mass, vaginal bleeding. Fever COMPARISON: CT 8/7/2020 TECHNIQUE: Transvaginal images were performed . FINDINGS: LMP - 20-30 years ago Uterus - 7.8 x 3.4 cm Endometrium - 1 cm Right Ovary - 4.3 x 1.9 x 1.6 cm Left Ovary - 13.6 x 9.2 x 8.5 cm The uterus is normal. A 2.7 cm hypoechoic area is noted near or within the cervix. It does not represent a simple. The endometrium is normal.  There is a small amount of simple appearing pelvic free fluid. The right ovary is of normal size. There are small cysts and follicles, the largest measuring 2.5 cm  There is normal color flow. There is no adnexal mass. The left ovary is of normal size. There is an 11.5 cm mass within the left ovary. It has a large amount of fairly homogeneous layering material. Hemorrhage could cause this as could pus. There is normal color flow. There is no adnexal mass. Large complex lesion in the left ovary could relate to abscess or hemorrhagic neoplasm either benign or malignant. Lack of significant ascites mitigates against malignancy. **This report has been created using voice recognition software. It may contain minor errors which are inherent in voice recognition technology. ** Final report electronically signed by Dr. Raquel Holcomb on 8/7/2020 1:44 PM    Ct Abdomen Pelvis W Iv Contrast Additional Contrast? None    Result Date: 8/7/2020  PROCEDURE: CT ABDOMEN PELVIS W IV CONTRAST CLINICAL INFORMATION: abdominal pain, pelvic pain . COMPARISON: None. TECHNIQUE: 5 mm axial CT images were obtained through the abdomen and pelvis after the administration of intravenous and oral contrast. Coronal and sagittal reconstructions were obtained. All CT scans at this facility use dose modulation, iterative reconstruction, and/or weight-based dosing when appropriate to reduce radiation dose to as low as reasonably achievable. FINDINGS Lung base: Mild groundglass airspace opacities within the lingula suggest pneumonia.  Mild left lower lobe scar. Liver and spleen: Mild fatty infiltration. Biliary: Multiple gallstones. Pancreas: head, body, and tail unremarkable. Adrenals: symmetric, no calcifications or masses. Kidneys: reniform, no hyperdense masses, large  calculi, or hydronephrosis. Aorta: normal caliber. Lymph Nodes: normal size. Bowel: Normal caliber. No evidence of obstruction. Mild sigmoid diverticulosis. Appendix is borderline enlarged though is not inflamed and thickened. Bladder: Normal Reproductive: A septated cystic mass within the left pelvis measures 13.5 cm x 9.0 cm. This most likely relates to the left ovary. A uterine source is also possible. This focus has mild adjacent inflammation. There is a candidate for right ovary contains multiple small follicles. The uterus contains a low attenuation focus inferiorly which could relate to nabothian cyst measuring 19 mm. Bones: Fusion at L5-S1. Lingular pneumonia. Cholelithiasis. Large pelvic mass which is mildly inflamed likely relating to the left ovary. Benign and malignant etiologies are possible. **This report has been created using voice recognition software. It may contain minor errors which are inherent in voice recognition technology. ** Final report electronically signed by Dr. Karis Hightower on 8/7/2020 1:23 PM    Xr Chest Portable    Result Date: 8/8/2020  PROCEDURE: XR CHEST PORTABLE CLINICAL INFORMATION: ett placement . COMPARISON: No prior study. TECHNIQUE: Portable semiupright FINDINGS: Endotracheal tube is 4 cm above the kirby. Central line is unchanged. NG tube extends into the stomach. Multiple EKG leads overlie the chest. Patient is rotated. There is left lower lobe atelectasis or infiltrate. Pulmonary vessels are not congested. Right lung is clear. ET node G-tube positions as above. Left lower lobe atelectasis or infiltrate. **This report has been created using voice recognition software.   It may contain minor errors which are inherent in voice **This report has been created using voice recognition software. It may contain minor errors which are inherent in voice recognition technology. ** Final report electronically signed by Dr. Fortino Ross on 8/7/2020 1:30 PM    Us Dup Abd Pel Retro Scrot Complete    Result Date: 8/7/2020  PROCEDURE: US NON OB TRANSVAGINAL, US DUP ABD PEL RETRO SCROT COMPLETE CLINICAL INFORMATION: cervical mass, vaginal bleeding. Fever COMPARISON: CT 8/7/2020 TECHNIQUE: Transvaginal images were performed . FINDINGS: LMP - 20-30 years ago Uterus - 7.8 x 3.4 cm Endometrium - 1 cm Right Ovary - 4.3 x 1.9 x 1.6 cm Left Ovary - 13.6 x 9.2 x 8.5 cm The uterus is normal. A 2.7 cm hypoechoic area is noted near or within the cervix. It does not represent a simple. The endometrium is normal.  There is a small amount of simple appearing pelvic free fluid. The right ovary is of normal size. There are small cysts and follicles, the largest measuring 2.5 cm  There is normal color flow. There is no adnexal mass. The left ovary is of normal size. There is an 11.5 cm mass within the left ovary. It has a large amount of fairly homogeneous layering material. Hemorrhage could cause this as could pus. There is normal color flow. There is no adnexal mass. Large complex lesion in the left ovary could relate to abscess or hemorrhagic neoplasm either benign or malignant. Lack of significant ascites mitigates against malignancy. **This report has been created using voice recognition software. It may contain minor errors which are inherent in voice recognition technology. ** Final report electronically signed by Dr. Fortino Ross on 8/7/2020 1:44 PM      **This report has been created using voice recognition software. It may contain minor errors which are inherent in voice recognition technology. **  Electronically signed by Dex Mckinney MD on 8/18/2020 at 2:18 PM

## 2020-08-18 NOTE — PROGRESS NOTES
Patient's son, Guera Magallanes at bedside this morning and updated on plan of care. Guera Magallanes expressed an interest in talking with Dr. Fide Han however she had already rounded this morning. This RN spoke with Dr. Fide Han who states she will be able to come back to the floor or call Guera Magallanes around 1700/1800.

## 2020-08-18 NOTE — CARE COORDINATION
8/18/20, 11:42 AM EDT    DISCHARGE ON GOING [address] [place] day: 11  Location: -05/005-A Reason for admit: Ovarian mass [N83.8]  Ovarian mass [N83.8]   Procedure: 8/7 CT Abd/pelvis: Lingular pneumonia; cholelithiasis; Large pelvic mass which is mildly inflamed likely relating to the left ovary. Benign and malignant etiologies are possible  8/7 US Abd/pelvis: Large complex lesion in the left ovary could relate to abscess or hemorrhagic neoplasm either benign or malignant. Lack of significant ascites mitigates against malignancy  8/7 Transvaginal US: Large pelvic mass which is mildly inflamed likely relating to the left ovary. Benign and malignant etiologies are possible  8/8 LAPAROTOMY EXPLORATORY, LEFT SALPINGO OOPHORECTOMY, SMALL BOWEL EXPLORATION, irrigation of purulent material  8/8 Intubated  8/8 CVC RIJ  8/10 KUB: Non obstructive bowel gas pattern; no evidence of ileus  8/11 Extubated  8/12 KUB: Increased gaseous distention throughout the small bowel loops. This is likely related to postsurgical ileus although a bowel obstruction can't be excluded. 8/13 PICC placed. Treatment Plan of Care: Hospitalist, OB/GYN and General Surgery following. POD #10. PT/OT. Cefepime iv q12hr, Cardizem po q6hr, Lovenox, Lasix iv bid, DuoNeb q4hr WA, Reglan iv q8hr, pain and nausea control. TPN continues. Dietitian and Pharmacy dosing. Wound care. Pt is passing gas this am. WBC 14.1. I-Toan 1.07. Calcium 7.6. Barriers to Discharge: Not medically ready. PCP: Zoe Landers MD  Readmission Risk Score: 15%  Patient Goals/Plan/Treatment Preferences: [facility] vs home with son in [place] vs [facility]. Insurance denied LTACH, P2P to be done, Dr. Sumanth Quintana aware.

## 2020-08-18 NOTE — DISCHARGE INSTR - COC
Continuity of Care Form    Patient Name: Santi Solano   :  1960  MRN:  [id number]    Admit date:  2020  Discharge date:  2020    Code Status Order: Prior   Advance Directives:   [facility] Documentation     Date/Time Healthcare Directive Type of Healthcare Directive Copy in [address] Agent's Name Healthcare Agent's Phone Number    [phone]  No, patient does not have an advance directive for healthcare treatment -- -- -- -- --          Admitting Physician:  Roger Appiah MD  PCP: Maryana Weathers MD    Discharging Nurse: [address] [place] [address] Unit/Room#: 3A-05/005-A  Discharging Unit Phone Number: [phone]    Emergency Contact:   Extended Emergency Contact Information  Primary Emergency Contact: Rickie Monday [address] Phone: [phone]  Relation: Child    Past Surgical History:  Past Surgical History:   Procedure Laterality Date    BACK SURGERY      COLONOSCOPY Left 2019    COLONOSCOPY POLYPECTOMY SNARE/COLD BIOPSY performed by Carie Camarena MD at [facility]    LAPAROTOMY N/A 2020    LAPAROTOMY EXPLORATORY, LEFT SALPINGO OOPHORECTOMY, SMALL BOWEL EXPLORATION performed by Gerard Walker MD at  [address] [place] [address]       Immunization History: There is no immunization history on file for this patient.     Active Problems:  Patient Active Problem List   Diagnosis Code    Chronic pain syndrome G89.4    Lumbar disc disease, herniated L5-S1 M51.9    Chronic gastritis K29.50    Cephalgia, intermittant R51    Allergic rhinitis J30.9    Lumbar post-laminectomy syndrome M96.1    Medication monitoring encounter Z51.81    IFG (impaired fasting glucose) R73.01    Chronic cough, family hx - COPD R05    Panlobular emphysema ([facility] [address] J43.1    Ovarian abscess N70.92    Septic shock (HCC) A41.9, R65.21    Sleep apnea G47.30    Stage 3 severe COPD by GOLD classification ([facility] J44.9    Chronic hypercapnic respiratory failure (HCC) J96.12       Isolation/Infection:   Isolation          No Isolation        Patient Infection Status     Infection Onset Added Last Indicated Last Indicated By Review Planned Expiration Resolved Resolved By    None active    Resolved    COVID-19 Rule Out 08/07/20 08/07/20 08/07/20 COVID-19 (Ordered)   08/07/20 Rule-Out Test Resulted          Nurse Assessment:  Last Vital Signs: BP (!) 142/65   Pulse 107   Temp 98.6 °F (37 °C) (Oral)   Resp 24   Ht 5' 8\" (1.727 m)   Wt 223 lb 6.4 oz (101.3 kg)   SpO2 91%   BMI 33.97 kg/m²     Last documented pain score (0-10 scale): Pain Level: 8(refuses pain medicine)  Last Weight:   Wt Readings from Last 1 Encounters:   08/18/20 223 lb 6.4 oz (101.3 kg)     Mental Status:  oriented and alert    IV Access:  - PICC - site  R Basilic, insertion date: 08/13/2020    Nursing Mobility/ADLs:  Walking   Assisted  Transfer  Assisted  Bathing  Assisted  Dressing  Assisted  Toileting  Assisted  Feeding  Independent  Med Admin  Assisted  Med Delivery   whole    Wound Care Documentation and Therapy:        Elimination:  Continence:   · Bowel: Yes  · Bladder: Yes  Urinary Catheter: None   Colostomy/Ileostomy/Ileal Conduit: No       Date of Last BM: 08/18/2020    Intake/Output Summary (Last 24 hours) at 8/18/2020 1750  Last data filed at 8/18/2020 1214  Gross per 24 hour   Intake 2045.69 ml   Output 2950 ml   Net -904.31 ml     I/O last 3 completed shifts: In: 2045.7 [P.O.:150; I.V.:1895.7]  Out: 2950 [Urine:2950]    Safety Concerns: At Risk for Falls    Impairments/Disabilities:      None    Nutrition Therapy:  Current Nutrition Therapy:   - Oral Diet:  General  - Parenteral Nutrition:  75 mL/hour    Routes of Feeding: Oral and PICC line  Liquids:  Thin Liquids  Daily Fluid Restriction: no  Last Modified Barium Swallow with Video (Video Swallowing Test): not done    Treatments at the Time of Hospital Discharge:   Respiratory Treatments: see orders  Oxygen Therapy:  is not on home oxygen therapy. Ventilator:    - No ventilator support, CPAP at night. Rehab Therapies: Physical Therapy and Occupational Therapy  Weight Bearing Status/Restrictions: No weight bearing restirctions  Other Medical Equipment (for information only, NOT a DME order):  walker  Other Treatments: dressing change, mesalt packing with dry guaze/abd over it. Patient's personal belongings (please select all that are sent with patient):  None    RN SIGNATURE:  Electronically signed by Mindi Louie RN on 8/18/20 at 6:05 PM EDT      PHYSICIAN SECTION    Prognosis: Fair    Condition at Discharge: Stable    Rehab Potential (if transferring to Rehab): Fair    Recommended Labs or Other Treatments After Discharge: Basic metabolic panel    Physician Certification: I certify the above information and transfer of Angelika Wills  is necessary for the continuing treatment of the diagnosis listed and that she requires LTAC for less 30 days.      Update Admission H&P: No change in H&P    PHYSICIAN SIGNATURE:  Electronically signed by Antonietta Lorenzo MD on 8/18/20 at 5:53 PM EDT

## 2020-08-18 NOTE — PROGRESS NOTES
Balance: Contact Guard Assistance. Patient required B UE hands on walker. BED MOBILITY:  Supine to Sit: Moderate Assistance with  B LE out of bed with additional time required. Sit to Supine: Moderate Assistance with B LE into bed with verbal ceus and education on log roll technique into bed. Scooting: Maximum Assistance  With use of Nerinx feature for scooting patient to head of bed. Scooting: Max A with therapist assisting with Moving B LE and adjusting patients hips to center of the bed. TRANSFERS:  Sit to Stand:  Minimal Assistance. slow pace, patient forward flexing when getting into standing position and looking like she was going to put B elbows on walker. Then patient corrected standing psoture. Stand to Sit: Contact Guard Assistance, X 1, cues for hand placement. FUNCTIONAL MOBILITY:  Assistive Device: Rolling Walker  Assist Level:  Minimal Assistance, X 1 and with verbal cues . Distance: Patient completing standing pivot of 180 degrees from bed to [facility]       Patient demonstrates fatigue, and moves very slowly with guarding or holding abdominals at times. ASSESSMENT:     Activity Tolerance:  Patient tolerance of  treatment: good. Discharge Recommendations: Patient would benefit from continued therapy after discharge, Continue to assess pending progress(recommend continued therapy  With IP Rehab, or SNF prior to returning home. If returning home patient would need 24 our supervision.)  Equipment Recommendations:  Other: Monitor pending progress  Plan: Times per week: 5x  Current Treatment Recommendations: Functional Mobility Training, Balance Training, Endurance Training, Safety Education & Training, Self-Care / ADL    Patient Education  Patient Education: Plan of Care, ADL's and Assistive Device Safety    Goals  Short term goals  Time Frame for Short term goals: until discharge  Short term goal 1: Complete various sit-stand t/fs including toilet with CGA & 0 vcs for safety  Short term goal 2: Complete mobility to/from bathroom with RW, CGA, & 0-2 vcs for walker safety  Short term goal 3: Tolerate 3-4 min standing ADL task with CGA for increased ease of sinkside grooming tasks  Short term goal 4: Complete LE dressing with min A & LH AE prn  Long term goals  Time Frame for Long term goals : No LTG set d/t short ELOS    Following session, patient left in safe position with all fall risk precautions in place.

## 2020-08-18 NOTE — FLOWSHEET NOTE
08/18/20 1306   Provider Notification   Reason for Communication Evaluate   Provider Name Dr. Josemanuel Butler   Provider Notification Physician   Method of Communication Secure Message   Notification Time [phone]   Asking if doctor is ok with hospitalist changing antibiotics to zosyn instead of cefepime and flagyl to see if patient tolerates any better. Dr. Josemanuel Butler stated she is ok with it as long as long as the culture is sensitive to the antibiotic and if Dr. Seth Cohen is ok with it.

## 2020-08-18 NOTE — PROGRESS NOTES
MD GISELLA Hoang DR GENERAL SURGERY  General Surgery Postoperative Progress Note    Pt Name: Omari Vincent  Medical Record Number: [id number]  Date of Birth 1960   Today's Date: 8/18/2020  ASSESSMENT   1. POD #  10 ex lap, abdominal washout , left salpingo-oophorectomy wit adnexal abscess excision performed by Dr. Chino Davidson with gynecology  2. Sepsis shock  present at time of admission- resolving  3. Purulent peritonitis present at time of surgery  4. Left adnexal abscess - path :Ovarian cyst, right, resection:    Cyst formation with abscess and inflamed partly ciliated epithelium.       Ovary with hemorrhage. has a past medical history of Allergic rhinitis, Chronic back pain, Headache(784.0), and Panlobular emphysema ([address] PLAN     1. Not tolerating flagyl will start zosyn instead   2. Diet as tolerated   3. Continues to have moderate amount of drainage from midline incision, change packing daily and change dressing when saturated  4. Ct without drainable collection. Brinda Luna is feeling ok except no appetite and has severe metallic taste.    CURRENT MEDICATIONS   Scheduled Meds:   pantoprazole  40 mg Intravenous Daily    piperacillin-tazobactam  3.375 g Intravenous Q8H    [START ON 8/19/2020] furosemide  40 mg Intravenous Daily    lactobacillus  1 capsule Oral TID WC    miconazole   Topical BID    polyethylene glycol  17 g Oral Daily    metoclopramide  10 mg Intravenous Q12H    dilTIAZem  30 mg Oral 4 times per day    phosphorus replacement protocol   Other RX Placeholder    magnesium replacement protocol   Other RX Placeholder    lidocaine 1 % injection  5 mL Intradermal Once    ipratropium-albuterol  1 ampule Inhalation Q4H WA    insulin lispro  0-6 Units Subcutaneous Q6H    calcium replacement protocol   Other RX Placeholder    sodium chloride flush  10 mL Intravenous 2 times per day    enoxaparin  40 mg Subcutaneous Daily     Continuous Infusions:   PN-Adult 3 IN 1 Central Line (Custom)      PN-Adult  3 IN 1 Central Line (Custom) 75 mL/hr at 20 1738    dextrose       PRN Meds:.albuterol sulfate HFA, morphine **OR** morphine, bisacodyl, [Held by provider] HYDROcodone-acetaminophen, fentanNYL, acetaminophen **OR** acetaminophen, potassium chloride, potassium chloride, potassium chloride **OR** potassium alternative oral replacement **OR** potassium chloride, glucose, dextrose, glucagon (rDNA), dextrose, sodium chloride flush, promethazine **OR** ondansetron  OBJECTIVE   CURRENT VITALS:  height is 5' 8\" (1.727 m) and weight is 223 lb 6.4 oz (101.3 kg). Her oral temperature is 98.8 °F (37.1 °C). Her blood pressure is 147/77 (abnormal) and her pulse is 101. Her respiration is 25 and oxygen saturation is 93%. Temperature Range (24h):Temp: 98.8 °F (37.1 °C) Temp  Av °F (37.2 °C)  Min: 98.3 °F (36.8 °C)  Max: 99.6 °F (37.6 °C)  BP Range (39O): Systolic (81YXT), ALA:649 , Min:119 , KDQ:728     Diastolic (94NRZ), YZX:05, Min:58, Max:78    Pulse Range (24h): Pulse  Av.6  Min: 101  Max: 109  Respiration Range (24h): Resp  Av  Min: 20  Max: 28  Current Pulse Ox (24h):  SpO2: 93 %  Pulse Ox Range (24h):  SpO2  Av.4 %  Min: 90 %  Max: 93 %  Oxygen Amount and Delivery: O2 Flow Rate (L/min): 2 L/min  Incentive Spirometry Tx: Respiratory Effort: Dyspnea with Exertion Treatment Tolerance: Other (Comment)(encouraged with cough and deep breathing) Incentive Spirometry Goal (mL): 1000 mL Incentive Spirometry Achieved (mL): 100 mL     interactive,NAD  Abdomen is soft nondistended. Incision is clean, serous/milky fluid dressing is saturated. .  Packing is in place. No erythema or induration Replaced outer dressing   Moves all extremities, no peripheral edema     In: .7 [P.O.:150; I.V.:1895.7]  Out: 9387 [Urine:2950]     Date 20 0000 - 20 9578   Shift 0749-3805 9053-3263 3454-2015 24 Hour Total   INTAKE   P.O.(mL/kg/hr) 50(0.1)   50   I. V.(mL/kg) software. It may contain minor errors which are inherent in voice recognition technology. **      Final report electronically signed by Dr. Tam Murrieta on 8/15/2020 9:08 AM      XR CHEST PORTABLE   Final Result   Minimal opacity in the left lung base possible atelectasis, minimal infiltrate or effusion. **This report has been created using voice recognition software. It may contain minor errors which are inherent in voice recognition technology. **      Final report electronically signed by Dr. Erma Calderon on 8/13/2020 2:13 PM      XR ABDOMEN (KUB) (SINGLE AP VIEW)   Final Result   Increased gaseous distention throughout the small bowel loops. This is likely related to postsurgical ileus although a bowel obstruction can't be excluded. **This report has been created using voice recognition software. It may contain minor errors which are inherent in voice recognition technology. **      Final report electronically signed by Dr Patel Keller on 8/12/2020 9:16 AM      XR CHEST PORTABLE   Final Result      Improved lateral left basilar infiltrate/atelectasis/small left pleural effusion. Mild atelectasis at the medial right lung base. Possible left hilar adenopathy which appears to be mildly improved. **This report has been created using voice recognition software. It may contain minor errors which are inherent in voice recognition technology. **      Final report electronically signed by Dr. Staci Barker on 8/10/2020 5:19 AM      XR ABDOMEN (KUB) (SINGLE AP VIEW)   Final Result      Non obstructive bowel gas pattern. No evidence of an ileus. **This report has been created using voice recognition software. It may contain minor errors which are inherent in voice recognition technology. **      Final report electronically signed by Dr. Staci Barker on 8/10/2020 3:26 AM      XR CHEST PORTABLE   Final Result   ET node G-tube positions as above.    Left lower lobe atelectasis or report has been created using voice recognition software. It may contain minor errors which are inherent in voice recognition technology. **      Final report electronically signed by Dr. Andrei Cross on 8/7/2020 1:44 PM      CT ABDOMEN PELVIS W IV CONTRAST Additional Contrast? None   Final Result   Lingular pneumonia. Cholelithiasis. Large pelvic mass which is mildly inflamed likely relating to the left ovary. Benign and malignant etiologies are possible. **This report has been created using voice recognition software. It may contain minor errors which are inherent in voice recognition technology. **      Final report electronically signed by Dr. Andrei Cross on 8/7/2020 1:23 PM          Electronically signed by Beatris Munoz MD on 8/18/2020 at 2:41 PM

## 2020-08-18 NOTE — PROGRESS NOTES
Discharge teaching and instructions for diagnosis/procedure of ovarian mass completed with patient using teachback method. AVS reviewed. Printed prescriptions given to patient. Patient voiced understanding regarding prescriptions, follow up appointments, and care of self at home.  Discharged in a wheelchair to  skilled nursing ([facility]) per hospital transport

## 2020-08-18 NOTE — FLOWSHEET NOTE
08/18/20 1732   Provider Notification   Reason for Communication Evaluate   Provider Name Dr. Bartolo Slaughter   Provider Notification Physician   Method of Communication Secure Message   Notification Time [phone]   Consult from hospitalist for \"Ovarian abscess, on antibiotics, going to [facility]\"

## 2020-08-18 NOTE — PROGRESS NOTES
Comprehensive Nutrition Assessment    Type and Reason for Visit:  Reassess(TPN + po diet monitor)    Nutrition Recommendations/Plan:   · Recommend with next TPN bag: 15 kcal/kgm, 1.2 grams protein/kgm, 30% lipids with dose weight 73kgm to provide 1095 kcals, 87 grams protein, 122 grams carbohydrate/ 24 hours. · Diet as per MD.       Nutrition Assessment:    Pt. with no improvement from a nutritional standpoint AEB remains unable to tolerate po due to indigestion, note tolerating TPN infusing at 75ml/ hour. Remains at risk for further nutritional compromise r/t admitted with ovarian abscess, was intubated this admit & extubated on (8/11), increased nutrient needs for healing s/p exploratory ap, oophorectomy, SB exploration (8/8), post op ileus , and underlying medical condition (Hx impaired glucose, chronic back pain). Nutrition recommendations/interventions as per above. Malnutrition Assessment:  Malnutrition Status: At risk for malnutrition (Comment)    Context:  Acute Illness     Findings of the 6 clinical characteristics of malnutrition:  Energy Intake:  7 - 50% or less of estimated energy requirements for 5 or more days  Weight Loss:  No significant weight loss     Body Fat Loss:  No significant body fat loss     Muscle Mass Loss:  No significant muscle mass loss    Fluid Accumulation:  Unable to assess     Strength:  Not Performed    Estimated Daily Nutrient Needs:  Energy (kcal):  7745-5262 kcal/day (20-24 kcal/kg); Weight Used for Energy Requirements:  (73 kg ABW)     Protein (g):  128+ grams/day (2+ grams/kgm IBW);  Weight Used for Protein Requirements:  Ideal        Fluid (ml/day):  per MD;        Nutrition Related Findings:  Post-op ileus, patient seen reports remains unable to take po (liquids and solids) due to acid reflux/ indigestion, declines ONS, no BM x 10 days, patient reports had small BM this morning; antibiotic, lasix, humalog, culturelle, reglan; BUN 22, Creatinine 0.4, glucose Status, GI Status, Nausea or Vomiting, Fluid Status or Edema     Discharge Planning:     Too soon to determine     Electronically signed by Vy Boateng RD, LD on 8/18/20 at 10:34 AM EDT    Contact: [phone]

## 2020-08-18 NOTE — FLOWSHEET NOTE
08/18/20 1745   Handoff   Communication Given Transfer Handoff   Oncoming Nurse/Offgoing Nurse Kristal/Lorri   Handoff Communication Telephone   Time Handoff Given 1745   Called report to nurse Giraldo in [address]

## 2020-08-18 NOTE — DISCHARGE SUMMARY
Hospitalist Discharge Summary        Patient: Mireya Jim  YOB: 1960  MRN: [id number]   Acct: [de-identified]    Primary Care Physician: Odalys Simmons MD    Admit date  8/7/2020    Discharge date:  8/18/2020 5:35 PM    Chief Complaint on presentation :-  Abdominal pain    Discharge Assessment and Plan:-     1. Acute hypoxic respiratory failure likely due to postop sedation, likely also has underlying CHICO. - Patient extubated on 8/11/2020 after requiring intubation for surgical procedure. - Patient continues to require oxygen to maintain adequate O2 saturations, currently on 1L via nasal cannula. - CPAP overnight  - breathing treatment  - Still + 12L fluid balance. Continue IV diuresis. Cut it down to lasix daily     2. Large left adnexal abscess: S/p exploratory laparotomy with left salpingo oophorectomy, small bowel exploration, and irrigation of purulent material on 8/8/2024 ovarian abscess per Dr. Daily Bonilla. - On IV cefepime and Flagyl (Day #11). -We will change her antibiotics to IV Zosyn as patient is not tolerating Flagyl. She keeps on having severe nausea every time she eats or tries to drink. She denies any dysphagia but rather states that food and drink gives metallic taste which makes her nauseated and had episodes of vomiting as well. She is already on bowel regimen. Had a repeat CT abdomen with IV contrast that showed surgical changes and mod ileus. We will continue Reglan. Of note she did have very small bowel movement yesterday. Changed to IV Protonix. I dont suspect there is a mechanical etiology for her current symptoms. I suspect this is still related to Flagyl. We will continue to monitor with changing IV antibiotics.     3. Acute normocytic anemia: Improving. Current H&H 10/33.3 with an MCV of 99.7.      4. Postoperative ileus: Passing gas. Unknown last BM, but abdo is soft, NT. Advance diet per surgery.  Continue reglan.      5. Leukocytosis: improving. Continue IV antibiotics.     6. GERD: Continue Pepcid.     7. Physical deconditioning: Secondary to above issues. PT/OT following, recommendations for continued therapy after discharge noted, appreciate assistance.     8. Obesity: BMI 33.74     9. Malnutrition              - Currently on TPN    Initial H and P and Hospital course:-    62year old  female transferred to [facility] ICU 8/8/2020 with hypotension and abdominal pain. Patient has a significant history of former smoker-quit 2019, COPD, impaired glucose tolerance, GERD, chronic back pain, headaches, and obesity. Estrella presented to [facility] ER with complaint of abdominal pain, fever, vaginal bleeding.  Patient identifies onset vaginal bleeding with passage of clots, averaging 3-4 pads/day. Onset proximately 2 weeks ago. Emy Serrato also identifies a fever of 102.9 that started approximately 2 days ago with complaints of lower abdominal pain. Gloria Sun states she noted a mass coming out of her vagina and tried to pull it out unsuccessfully secondary to pain.  8/7/2020 transvaginal ultrasound noted large complex lesion in the left ovary could relate to abscess or hemorrhagic neoplasm.  CT abdomen pelvis with IV contrast mild groundglass airspace opacities within the lingula suggest pneumonia.  Mild fatty infiltration of liver, multiple gallstones biliary.  A spiculated cystic mass within the left pelvis.  Large pelvic mass which is mildly inflamed likely related to the left ovary. Evaluation occurred by OB/GYN Dr. Boles, an Endocervical polyp was removed and sent for pathology.  Patient was [place]. 8/8/2020 I was called to evaluate patient secondary to hypotension.  Patient was not responsive to 2 L of 0.9 normal saline systolic blood pressure remained 68-74 with a heart rate of 120.  Levophed drip was started and patient was admitted to the ICU for further evaluation and care. Patient was weaned off Levo.  OB/Gyn service was consulted and she was taken to OR and had ex lap with left salpingo oophorectomy, small bowel exploration and irrigation of purulent material. She was started on IV antibiotics. She was extubated and transferred to step down floor. She continued to work with PT/OT. Had few attempts to increase her diet but patient was intolerant. She has severe nausea and c/o metalic taste in mouth. Also requiring IV antiemetics before flagyl. Started on TPN for nutrition. Over next several days, patient continued to feel nauseated and not able to eat. Patient also has ileus with her last bowel movement a day ago. Due to her chronic nature of symptoms and requiring TPN and no BM, patient will be transferred to [facility]. She will continue IV antibiotics for total of 14 days. Of note her antibiotics was changed to IV zosyn today in order to see if that will help her nausea. She will be continued no home medications. Physical Exam:-  Vitals:   Patient Vitals for the past 24 hrs:   BP Temp Temp src Pulse Resp SpO2 Weight   08/18/20 1635 (!) 142/65 98.6 °F (37 °C) Oral 107 24 91 % --   08/18/20 1222 -- -- -- -- 25 -- --   08/18/20 1217 (!) 147/77 98.8 °F (37.1 °C) Oral 101 22 93 % --   08/18/20 0910 (!) 161/69 98.8 °F (37.1 °C) Oral 109 28 91 % --   08/18/20 0341 126/62 99.2 °F (37.3 °C) Oral 102 20 91 % 223 lb 6.4 oz (101.3 kg)   08/18/20 0020 -- -- -- -- 26 90 % --   08/18/20 0019 -- -- -- -- 25 90 % --   08/18/20 0003 (!) 119/58 99.4 °F (37.4 °C) Oral 101 20 92 % --   08/17/20 2115 137/60 99.6 °F (37.6 °C) Oral 105 20 93 % --     Weight:   Weight: 223 lb 6.4 oz (101.3 kg)   24 hour intake/output:     Intake/Output Summary (Last 24 hours) at 8/18/2020 1735  Last data filed at 8/18/2020 1214  Gross per 24 hour   Intake 2045.69 ml   Output 2950 ml   Net -904.31 ml       1. General appearance: No apparent distress, appears stated age and cooperative. 2. HEENT: Normal cephalic, atraumatic without obvious deformity.  Pupils equal, round, and reactive to light. Extra ocular muscles intact. Conjunctivae/corneas clear. 3. Neck: Supple, with full range of motion. No jugular venous distention. Trachea midline. 4. Respiratory:  Normal respiratory effort. Clear to auscultation, bilaterally without Rales/Wheezes/Rhonchi. 5. Cardiovascular: Regular rate and rhythm with normal S1/S2 without murmurs, rubs or gallops. 6. Abdomen: Soft, non-tender, non-distended with normal bowel sounds. 7. Musculoskeletal:  No clubbing, cyanosis or edema bilaterally. 8. Skin: Skin color, texture, turgor normal.  No rashes or lesions. 9. Neurologic:  Neurovascularly intact without any focal sensory/motor deficits. Cranial nerves: II-XII intact, grossly non-focal.  10. Psychiatric: Alert and oriented, thought content appropriate, normal insight  11. Capillary Refill: Brisk,< 3 seconds   12.  Peripheral Pulses: +2 palpable, equal bilaterally       Discharge Medications:-      Medication List      START taking these medications    dilTIAZem 30 MG tablet  Commonly known as:  CARDIZEM  Take 1 tablet by mouth every 6 hours     Stiolto Respimat 2.5-2.5 MCG/ACT Aers  Generic drug:  Tiotropium Bromide-Olodaterol  Inhale 2 actuation into the lungs daily        CONTINUE taking these medications    albuterol sulfate  (90 Base) MCG/ACT inhaler  INHALE 2 PUFFS BY MOUTH INTO THE LUNGS EVERY 6 HOURS AS NEEDED FOR WHEEZING     famotidine 20 MG tablet  Commonly known as:  PEPCID     FLEXERIL PO     ibuprofen 200 MG tablet  Commonly known as:  ADVIL;MOTRIN     MUCINEX D PO     nicotine polacrilex 4 MG lozenge  Commonly known as:  EQL Nicotine  Take 1 lozenge by mouth as needed for Smoking cessation     oxyCODONE-acetaminophen 7.5-325 MG per tablet  Commonly known as:  PERCOCET        STOP taking these medications    sucralfate 1 GM tablet  Commonly known as:  Carafate           Where to Get Your Medications      These medications were sent to [address], [address], [address] [facility] [place] [address]    Phone:  [phone]   · Stiolto Respimat 2.5-2.5 MCG/ACT Aers     You can get these medications from any pharmacy    Bring a paper prescription for each of these medications  · dilTIAZem 30 MG tablet          Labs :-  Recent Results (from the past 72 hour(s))   POCT Glucose    Collection Time: 08/15/20  8:23 PM   Result Value Ref Range    POC Glucose 149 (H) 70 - 108 mg/dl   POCT glucose    Collection Time: 08/16/20  4:12 AM   Result Value Ref Range    POC Glucose 146 (H) 70 - 108 mg/dl   Basic Metabolic Panel    Collection Time: 08/16/20  4:13 AM   Result Value Ref Range    Sodium 135 135 - 145 meq/L    Potassium 4.6 3.5 - 5.2 meq/L    Chloride 103 98 - 111 meq/L    CO2 29 23 - 33 meq/L    Glucose 151 (H) 70 - 108 mg/dL    BUN 27 (H) 7 - 22 mg/dL    CREATININE 0.3 (L) 0.4 - 1.2 mg/dL    Calcium 7.4 (L) 8.5 - 10.5 mg/dL   Magnesium    Collection Time: 08/16/20  4:13 AM   Result Value Ref Range    Magnesium 2.0 1.6 - 2.4 mg/dL   Phosphorus    Collection Time: 08/16/20  4:13 AM   Result Value Ref Range    Phosphorus 2.2 (L) 2.4 - 4.7 mg/dL   Calcium, Ionized    Collection Time: 08/16/20  4:13 AM   Result Value Ref Range    Calcium, Ion 1.03 (L) 1.12 - 1.32 mmol/L   Anion Gap    Collection Time: 08/16/20  4:13 AM   Result Value Ref Range    Anion Gap 3.0 (L) 8.0 - 16.0 meq/L   Glomerular Filtration Rate, Estimated    Collection Time: 08/16/20  4:13 AM   Result Value Ref Range    Est, Glom Filt Rate >90 ml/min/1.73m2   POCT Glucose    Collection Time: 08/16/20  9:07 AM   Result Value Ref Range    POC Glucose 147 (H) 70 - 108 mg/dl   POCT glucose    Collection Time: 08/16/20  4:57 PM   Result Value Ref Range    POC Glucose 178 (H) 70 - 108 mg/dl   POCT Glucose    Collection Time: 08/16/20  9:36 PM   Result Value Ref Range    POC Glucose 155 (H) 70 - 108 mg/dl   POCT glucose    Collection Time: 08/17/20  3:17 AM Result Value Ref Range    POC Glucose 134 (H) 70 - 108 mg/dl   Basic Metabolic Panel    Collection Time: 08/17/20  3:36 AM   Result Value Ref Range    Sodium 132 (L) 135 - 145 meq/L    Potassium 4.6 3.5 - 5.2 meq/L    Chloride 98 98 - 111 meq/L    CO2 28 23 - 33 meq/L    Glucose 135 (H) 70 - 108 mg/dL    BUN 26 (H) 7 - 22 mg/dL    CREATININE 0.3 (L) 0.4 - 1.2 mg/dL    Calcium 7.5 (L) 8.5 - 10.5 mg/dL   Magnesium    Collection Time: 08/17/20  3:36 AM   Result Value Ref Range    Magnesium 2.1 1.6 - 2.4 mg/dL   Phosphorus    Collection Time: 08/17/20  3:36 AM   Result Value Ref Range    Phosphorus 3.2 2.4 - 4.7 mg/dL   Calcium, Ionized    Collection Time: 08/17/20  3:36 AM   Result Value Ref Range    Calcium, Ion 1.08 (L) 1.12 - 1.32 mmol/L   CBC auto differential    Collection Time: 08/17/20  3:36 AM   Result Value Ref Range    WBC 18.6 (H) 4.8 - 10.8 thou/mm3    RBC 3.21 (L) 4.20 - 5.40 mill/mm3    Hemoglobin 9.6 (L) 12.0 - 16.0 gm/dl    Hematocrit 31.5 (L) 37.0 - 47.0 %    MCV 98.1 81.0 - 99.0 fL    MCH 29.9 26.0 - 33.0 pg    MCHC 30.5 (L) 32.2 - 35.5 gm/dl    RDW-CV 13.7 11.5 - 14.5 %    RDW-SD 47.5 (H) 35.0 - 45.0 fL    Platelets 912 (H) 887 - 400 thou/mm3    MPV 10.9 9.4 - 12.4 fL    Seg Neutrophils 83.9 %    Lymphocytes 6.9 %    Monocytes 4.1 %    Eosinophils 1.3 %    Basophils 0.3 %    Immature Granulocytes 3.5 %    Platelet Estimate ADEQUATE Adequate    Segs Absolute 15.6 (H) 1.8 - 7.7 thou/mm3    Lymphocytes Absolute 1.3 1.0 - 4.8 thou/mm3    Monocytes Absolute 0.8 0.4 - 1.3 thou/mm3    Eosinophils Absolute 0.2 0.0 - 0.4 thou/mm3    Basophils Absolute 0.1 0.0 - 0.1 thou/mm3    Immature Grans (Abs) 0.66 (H) 0.00 - 0.07 thou/mm3    nRBC 0 /100 wbc   Anion Gap    Collection Time: 08/17/20  3:36 AM   Result Value Ref Range    Anion Gap 6.0 (L) 8.0 - 16.0 meq/L   Glomerular Filtration Rate, Estimated    Collection Time: 08/17/20  3:36 AM   Result Value Ref Range    Est, Glom Filt Rate >90 ml/min/1.73m2   Scan of Blood Smear    Collection Time: 08/17/20  3:36 AM   Result Value Ref Range    SCAN OF BLOOD SMEAR see below    Procalcitonin    Collection Time: 08/17/20  3:36 AM   Result Value Ref Range    Procalcitonin 0.34 (H) 0.01 - 0.09 ng/mL   POCT Glucose    Collection Time: 08/17/20  8:51 AM   Result Value Ref Range    POC Glucose 142 (H) 70 - 108 mg/dl   POCT glucose    Collection Time: 08/17/20  2:53 PM   Result Value Ref Range    POC Glucose 134 (H) 70 - 108 mg/dl   POCT Glucose    Collection Time: 08/17/20  9:10 PM   Result Value Ref Range    POC Glucose 137 (H) 70 - 108 mg/dl   Basic Metabolic Panel    Collection Time: 08/18/20  3:35 AM   Result Value Ref Range    Sodium 137 135 - 145 meq/L    Potassium 4.5 3.5 - 5.2 meq/L    Chloride 101 98 - 111 meq/L    CO2 30 23 - 33 meq/L    Glucose 133 (H) 70 - 108 mg/dL    BUN 22 7 - 22 mg/dL    CREATININE 0.4 0.4 - 1.2 mg/dL    Calcium 7.6 (L) 8.5 - 10.5 mg/dL   Magnesium    Collection Time: 08/18/20  3:35 AM   Result Value Ref Range    Magnesium 2.0 1.6 - 2.4 mg/dL   Phosphorus    Collection Time: 08/18/20  3:35 AM   Result Value Ref Range    Phosphorus 2.4 2.4 - 4.7 mg/dL   Calcium, Ionized    Collection Time: 08/18/20  3:35 AM   Result Value Ref Range    Calcium, Ion 1.07 (L) 1.12 - 1.32 mmol/L   CBC    Collection Time: 08/18/20  3:35 AM   Result Value Ref Range    WBC 14.1 (H) 4.8 - 10.8 thou/mm3    RBC 3.13 (L) 4.20 - 5.40 mill/mm3    Hemoglobin 9.4 (L) 12.0 - 16.0 gm/dl    Hematocrit 30.4 (L) 37.0 - 47.0 %    MCV 97.1 81.0 - 99.0 fL    MCH 30.0 26.0 - 33.0 pg    MCHC 30.9 (L) 32.2 - 35.5 gm/dl    RDW-CV 14.3 11.5 - 14.5 %    RDW-SD 47.3 (H) 35.0 - 45.0 fL    Platelets 192 885 - 631 thou/mm3    MPV 10.8 9.4 - 12.4 fL   Anion Gap    Collection Time: 08/18/20  3:35 AM   Result Value Ref Range    Anion Gap 6.0 (L) 8.0 - 16.0 meq/L   Glomerular Filtration Rate, Estimated    Collection Time: 08/18/20  3:35 AM   Result Value Ref Range    Est, Glom Filt Rate >90 ml/min/1.73m2 POCT glucose    Collection Time: 08/18/20  3:54 AM   Result Value Ref Range    POC Glucose 130 (H) 70 - 108 mg/dl   POCT Glucose    Collection Time: 08/18/20  9:26 AM   Result Value Ref Range    POC Glucose 144 (H) 70 - 108 mg/dl   POCT glucose    Collection Time: 08/18/20  2:46 PM   Result Value Ref Range    POC Glucose 133 (H) 70 - 108 mg/dl        Microbiology:    Blood culture #1:   Lab Results   Component Value Date    BC No growth-preliminary No growth  08/07/2020       Blood culture #2:No results found for: BLOODCULT2    Organism:    Lab Results   Component Value Date    LABGRAM  08/08/2020     Rare segmented neutrophils observed. No organisms observed. MRSA culture only:No results found for: [facility]    Urine culture:   Lab Results   Component Value Date    LABURIN No growth-preliminary No growth  08/08/2020     Lab Results   Component Value Date    ORG Fusobacterium nucleatum 08/08/2020        Respiratory culture: No results found for: CULTRESP    Aerobic and Anaerobic :  Lab Results   Component Value Date    LABAERO No growth-preliminary No growth  08/08/2020     Lab Results   Component Value Date    LABANAE light growth beta-lactamase negative  08/08/2020       Urinalysis:      Lab Results   Component Value Date    NITRU NEGATIVE 08/08/2020    WBCUA 5-9 08/08/2020    BACTERIA NONE SEEN 08/08/2020    RBCUA 50-75 08/08/2020    BLOODU MODERATE 08/08/2020    SPECGRAV 1.015 11/09/2019    GLUCOSEU NEGATIVE 08/08/2020       Radiology:-  Us Non Ob Transvaginal    Result Date: 8/7/2020  PROCEDURE: US NON OB TRANSVAGINAL, US DUP ABD PEL RETRO SCROT COMPLETE CLINICAL INFORMATION: cervical mass, vaginal bleeding. Fever COMPARISON: CT 8/7/2020 TECHNIQUE: Transvaginal images were performed . FINDINGS: LMP - 20-30 years ago Uterus - 7.8 x 3.4 cm Endometrium - 1 cm Right Ovary - 4.3 x 1.9 x 1.6 cm Left Ovary - 13.6 x 9.2 x 8.5 cm The uterus is normal. A 2.7 cm hypoechoic area is noted near or within the cervix.  It does not represent a simple. The endometrium is normal.  There is a small amount of simple appearing pelvic free fluid. The right ovary is of normal size. There are small cysts and follicles, the largest measuring 2.5 cm  There is normal color flow. There is no adnexal mass. The left ovary is of normal size. There is an 11.5 cm mass within the left ovary. It has a large amount of fairly homogeneous layering material. Hemorrhage could cause this as could pus. There is normal color flow. There is no adnexal mass. Large complex lesion in the left ovary could relate to abscess or hemorrhagic neoplasm either benign or malignant. Lack of significant ascites mitigates against malignancy. **This report has been created using voice recognition software. It may contain minor errors which are inherent in voice recognition technology. ** Final report electronically signed by Dr. Roberto Riley on 8/7/2020 1:44 PM    Ct Abdomen Pelvis W Iv Contrast Additional Contrast? None    Result Date: 8/7/2020  PROCEDURE: CT ABDOMEN PELVIS W IV CONTRAST CLINICAL INFORMATION: abdominal pain, pelvic pain . COMPARISON: None. TECHNIQUE: 5 mm axial CT images were obtained through the abdomen and pelvis after the administration of intravenous and oral contrast. Coronal and sagittal reconstructions were obtained. All CT scans at this facility use dose modulation, iterative reconstruction, and/or weight-based dosing when appropriate to reduce radiation dose to as low as reasonably achievable. FINDINGS Lung base: Mild groundglass airspace opacities within the lingula suggest pneumonia. Mild left lower lobe scar. Liver and spleen: Mild fatty infiltration. Biliary: Multiple gallstones. Pancreas: head, body, and tail unremarkable. Adrenals: symmetric, no calcifications or masses. Kidneys: reniform, no hyperdense masses, large  calculi, or hydronephrosis. Aorta: normal caliber. Lymph Nodes: normal size. Bowel: Normal caliber.  No evidence of obstruction. Mild sigmoid diverticulosis. Appendix is borderline enlarged though is not inflamed and thickened. Bladder: Normal Reproductive: A septated cystic mass within the left pelvis measures 13.5 cm x 9.0 cm. This most likely relates to the left ovary. A uterine source is also possible. This focus has mild adjacent inflammation. There is a candidate for right ovary contains multiple small follicles. The uterus contains a low attenuation focus inferiorly which could relate to nabothian cyst measuring 19 mm. Bones: Fusion at L5-S1. Lingular pneumonia. Cholelithiasis. Large pelvic mass which is mildly inflamed likely relating to the left ovary. Benign and malignant etiologies are possible. **This report has been created using voice recognition software. It may contain minor errors which are inherent in voice recognition technology. ** Final report electronically signed by Dr. Fortino Ross on 8/7/2020 1:23 PM    Xr Chest Portable    Result Date: 8/8/2020  PROCEDURE: XR CHEST PORTABLE CLINICAL INFORMATION: ett placement . COMPARISON: No prior study. TECHNIQUE: Portable semiupright FINDINGS: Endotracheal tube is 4 cm above the kirby. Central line is unchanged. NG tube extends into the stomach. Multiple EKG leads overlie the chest. Patient is rotated. There is left lower lobe atelectasis or infiltrate. Pulmonary vessels are not congested. Right lung is clear. ET node G-tube positions as above. Left lower lobe atelectasis or infiltrate. **This report has been created using voice recognition software. It may contain minor errors which are inherent in voice recognition technology. ** Final report electronically signed by Dr. Carl Schafer on 8/8/2020 9:54 AM    Xr Chest Portable    Result Date: 8/8/2020  PROCEDURE: XR CHEST PORTABLE CLINICAL INFORMATION: CVC placement.  COMPARISON: Chest x-ray dated 8/7/2020 TECHNIQUE: AP Portable chest xray FINDINGS: Lines/tubes/devices: Interval placement of a right jugular central venous catheter, tip in the region of the mid SVC. Lungs/pleura: There are mild bilateral interstitial infiltrates which probably represent interstitial pulmonary fibrosis however cannot exclude mild interstitial pulmonary edema or an atypical viral-type pneumonia. Correlate clinically. There is a new patchy opacity at the lateral left lung base obscuring the costophrenic angle which may represent a small pneumonia, atelectasis, or tiny left pleural effusion. There is no pneumothorax. Heart: Heart size is normal. Mediastinum/filiberto: No obvious mass or adenopathy. Skeleton: No significant bone or joint abnormality. Interval placement of a right jugular central venous catheter, tip in the mid SVC, no pneumothorax. New opacity at the lateral left lung base which may represent a small infiltrate, atelectasis or small left pleural effusion. Interstitial prominence, acute versus chronic. See discussion above and correlate clinically. **This report has been created using voice recognition software. It may contain minor errors which are inherent in voice recognition technology. ** Final report electronically signed by Dr. Clau Mckenzie on 8/8/2020 5:53 AM    Xr Chest 1 View    Result Date: 8/7/2020  PROCEDURE: XR CHEST 1 VIEW CLINICAL INFORMATION: fever. COMPARISON: Multiple previous most recent 3/5/2018 TECHNIQUE: AP upright view of the chest. FINDINGS: Heart size is normal. Mild and lower lobe opacities are linear favoring scar. No definite confluent infiltrate. No confluent infiltrate. PA and lateral radiographs could be performed if indicated clinically. **This report has been created using voice recognition software. It may contain minor errors which are inherent in voice recognition technology. ** Final report electronically signed by Dr. James Cifuentes on 8/7/2020 1:30 PM    Us Dup Abd Pel Retro Scrot Complete    Result Date: 8/7/2020  PROCEDURE: US NON OB TRANSVAGINAL, US DUP ABD PEL RETRO SCROT COMPLETE CLINICAL INFORMATION: cervical mass, vaginal bleeding. Fever COMPARISON: CT 8/7/2020 TECHNIQUE: Transvaginal images were performed . FINDINGS: LMP - 20-30 years ago Uterus - 7.8 x 3.4 cm Endometrium - 1 cm Right Ovary - 4.3 x 1.9 x 1.6 cm Left Ovary - 13.6 x 9.2 x 8.5 cm The uterus is normal. A 2.7 cm hypoechoic area is noted near or within the cervix. It does not represent a simple. The endometrium is normal.  There is a small amount of simple appearing pelvic free fluid. The right ovary is of normal size. There are small cysts and follicles, the largest measuring 2.5 cm  There is normal color flow. There is no adnexal mass. The left ovary is of normal size. There is an 11.5 cm mass within the left ovary. It has a large amount of fairly homogeneous layering material. Hemorrhage could cause this as could pus. There is normal color flow. There is no adnexal mass. Large complex lesion in the left ovary could relate to abscess or hemorrhagic neoplasm either benign or malignant. Lack of significant ascites mitigates against malignancy. **This report has been created using voice recognition software. It may contain minor errors which are inherent in voice recognition technology. ** Final report electronically signed by Dr. Roberto Riley on 8/7/2020 1:44 PM       Follow-up scheduled after discharge :-    in the next few days with Zoe Landers MD    Consultations during this hospital stay:-  [] NONE [] Cardiology  [] Nephrology  [] Hemo onco   [] GI   [] ID  [] Endocrine  [] Pulm    [] Neuro    [] Psych   [] Urology  [] ENT   [] G SURGERY   []Ortho    []CV surg    [] Palliative  [] Hospice [] Pain management   []    []TCU   [] PT/OT  OTHERS:-    Disposition: long term care facility  Condition at Discharge: Stable    Time Spent:- 25 minutes    Electronically signed by Jonny Payne MD on 8/18/2020 at 5:35 PM  Discharging Hospitalist

## 2020-08-18 NOTE — FLOWSHEET NOTE
08/18/20 1348   Provider Notification   Reason for Communication Evaluate   Provider Name Dr. Zayda Hsieh   Provider Notification Physician   Method of Communication Secure Message   Notification Time [id number]   Questioned if doctor was still ok with transferring patient off unit as he had placed an order for yesterday and expressed that Dr. Anthony Barrett mentioned patient would benefit from stepdown for a bit longer. Dr. Zayda Hsieh states to keep the patient on stepdown unit.

## 2020-08-18 NOTE — FLOWSHEET NOTE
08/18/20 1323   Provider Notification   Reason for Communication Evaluate   Provider Name Dr. Ida Dickson   Provider Notification Physician   Method of Communication Secure Message   Notification Time    Asking if doctor is ok with changing antibiotics to zosyn instead of cefepime and vanc. Doctor states this is ok. Notified Dr. Mariela Cason of approval for changing antibiotics.

## 2020-08-18 NOTE — PLAN OF CARE
Problem: Pain:  Description: Pain management should include both nonpharmacologic and pharmacologic interventions. Goal: Pain level will decrease  8/17/2020 2248 by Maldonado Deluca RN  Outcome: Ongoing  Note: The patient did have some discomfort but refused any pain medications at this time. Continue to assess and monitor at this time. 8/17/2020 1344 by Darby Oliveros RN  Outcome: Ongoing  Note: Scheduled Toradol for abdominal pain. Mid Abdominal incision tenderness - dressing changed by  - Mesalt ribbon packing to 4 separate sites of the abdominal incision  PRN pain medication available upon request.     Goal: Control of acute pain  Outcome: Ongoing  Goal: Control of chronic pain  Outcome: Ongoing     Problem: Nutrition  Goal: Optimal nutrition therapy  8/17/2020 2248 by Maldonado Deluca RN  Outcome: Ongoing  Note: The patient is still have c/o nausea. Reglan ordered scheduled and has PRN zofran. 8/17/2020 1344 by Darby Oliveros RN  Outcome: Ongoing  Note: New order received for a general diet. Patient only ate 5% of breakfast and only 15% of lunch. PRN zofran administered durring lunch due to increased nausea. No vomiting or emesis noted  Bowel sounds active x 4. No BM today. TPN as ordered per pharmacy. 8/17/2020 1118 by Desire Donald RD, LD  Outcome: Ongoing     Problem: Musculor/Skeletal Functional Status  Goal: Highest potential functional level  8/17/2020 2248 by Maldonado Deluca RN  Outcome: Ongoing  8/17/2020 1344 by Darby Oliveros RN  Outcome: Ongoing  Note: Increased activity today - up to chair with therapy   Tolerated activity well however was in need of rolling walker and assist x 1 when up. Problem: Discharge Planning:  Goal: Participates in care planning  8/17/2020 2248 by Maldonado Deluca RN  Outcome: Ongoing  Note: Discharge is pending at this time.   8/17/2020 1344 by Darby Oliveros RN  Outcome: Ongoing  Note: Will likely need rehab or therapy for strengthening     Goal: Discharged to appropriate level of care  Outcome: Ongoing     Problem: Airway Clearance - Ineffective:  Goal: Ability to maintain a clear airway will improve  Outcome: Ongoing     Problem: Anxiety/Stress:  Goal: Level of anxiety will decrease  Outcome: Ongoing     Problem: Aspiration:  Goal: Absence of aspiration  Outcome: Ongoing     Problem: Bowel Function - Altered:  Goal: Bowel elimination is within specified parameters  Outcome: Ongoing     Problem: Cardiac Output - Decreased:  Goal: Hemodynamic stability will improve  Outcome: Ongoing     Problem: Fluid Volume - Imbalance:  Goal: Absence of imbalanced fluid volume signs and symptoms  Outcome: Ongoing     Problem: Gas Exchange - Impaired:  Goal: Levels of oxygenation will improve  Outcome: Ongoing     Problem: Nutrition Deficit:  Goal: Ability to achieve adequate nutritional intake will improve  Outcome: Ongoing     Problem: Pain:  Description: Pain management should include both nonpharmacologic and pharmacologic interventions. Goal: Pain level will decrease  8/17/2020 8758 by Maldonado Deluca RN  Outcome: Ongoing  Note: The patient did have some discomfort but refused any pain medications at this time. Continue to assess and monitor at this time. 8/17/2020 1344 by Darby Oliveros RN  Outcome: Ongoing  Note: Scheduled Toradol for abdominal pain. Mid Abdominal incision tenderness - dressing changed by  - Mesalt ribbon packing to 4 separate sites of the abdominal incision  PRN pain medication available upon request.     Goal: Recognizes and communicates pain  Outcome: Ongoing  Goal: Control of acute pain  Outcome: Ongoing  Goal: Control of chronic pain  Outcome: Ongoing     Problem: Serum Glucose Level - Abnormal:  Goal: Ability to maintain appropriate glucose levels will improve to within specified parameters  Outcome: Ongoing  Note: Chem was in the 130s. No insulin needed at this time.      Problem: Skin Integrity - Impaired:  Goal: Will show no infection signs and symptoms  Outcome: Ongoing  Note: No new skin breakdown this shift. Goal: Absence of new skin breakdown  Outcome: Ongoing     Problem: Sleep Pattern Disturbance:  Goal: Appears well-rested  Outcome: Ongoing     Problem: Tissue Perfusion, Altered:  Goal: Circulatory function within specified parameters  Outcome: Ongoing     Problem: Tissue Perfusion - Cardiopulmonary, Altered:  Goal: Absence of angina  Outcome: Ongoing  Goal: Hemodynamic stability will improve  Outcome: Ongoing     Problem: Infection - Central Venous Catheter-Associated Bloodstream Infection:  Goal: Will show no infection signs and symptoms  8/17/2020 2248 by Niko Portillo RN  Outcome: Ongoing  8/17/2020 1344 by Xochitl Bañuelos RN  Outcome: Ongoing  Note: Patient is afebrile this shift. WBC's increassed today however she has been asymptomatic   PICC line site is without redness or swelling   No signs or symptoms of infection noted. Problem: Infection - Surgical Site:  Goal: Will show no infection signs and symptoms  8/17/2020 2248 by Niko Portillo RN  Outcome: Ongoing  8/17/2020 1344 by Xochitl Bañuelos RN  Outcome: Ongoing  Note: Patient is afebrile this shift. WBC's increassed today however she has been asymptomatic   PICC line site is without redness or swelling   No signs or symptoms of infection noted. Problem: Urinary Elimination:  Goal: Signs and symptoms of infection will decrease  8/17/2020 2248 by Niko Portillo RN  Outcome: Ongoing  8/17/2020 1344 by Xochitl Bañuelos RN  Outcome: Ongoing  Note: Olivera catheter to be removed today. Patient has requested to have the olivera removed after she has returned to the bed.       Problem: Skin Integrity:  Goal: Will show no infection signs and symptoms  8/17/2020 2248 by Niko Portillo RN  Outcome: Ongoing  8/17/2020 1344 by Xochitl Bañuelos RN  Outcome: Ongoing  Note: Patient is afebrile this shift.   WBC's increassed today however she has been asymptomatic   PICC line site is without redness or swelling   No signs or symptoms of infection noted. Goal: Absence of new skin breakdown  Outcome: Ongoing     Problem: Falls - Risk of:  Goal: Will remain free from falls  8/17/2020 4108 by Josefina Moreno RN  Outcome: Ongoing  Note: The patient has been free of falls this shift. Bed is in low position, 2/4 rails are up, and alarm is active. Call light is in reach, 2/4 rails are up, and hourly rounding performed. 8/17/2020 1344 by Gini Roberson RN  Outcome: Ongoing  Note: No falls this shift. Patient making use of call light for assist when needed. Goal: Absence of physical injury  Outcome: Ongoing   Care plan reviewed with patient. The patient verbalizes understanding and contributed to plan of care.

## 2020-08-18 NOTE — PLAN OF CARE
Problem: Nutrition  Goal: Optimal nutrition therapy  8/18/2020 1034 by Henry Cason RD, LD  Outcome: Ongoing   Nutrition Problem #1: Inadequate oral intake  Intervention: Food and/or Nutrient Delivery: Modify Parenteral Nutrition(diet as per MD)  Nutritional Goals: Pt will tolerate adequate nutrition support to meet 75% or more of estimated nutritional needs during LOS until able to transition to solely po feed

## 2020-08-18 NOTE — PROGRESS NOTES
[facility]      Patient - Mali Bartholomew   MRN -  [id number]   [facility]   - 1960      Date of Admission -  2020 10:16 AM  Date of evaluation -  2020  Room - 3A--A   Hospital Day - Λ. Πεντέλης MD Evelin Primary Care Physician - Verónica Tamayo MD     Problem List      Active Hospital Problems    Diagnosis Date Noted    Sleep apnea [G47.30]     Stage 3 severe COPD by GOLD classification [facility]) [J44.9]     Chronic hypercapnic respiratory failure ([facility] [J96.12]     Septic shock ([facility] [A41.9, R65.21]     Ovarian abscess [N70.92] 2020    Panlobular emphysema ([facility] [J43.1] 03/15/2018     Reason for Consult    CO2 Retention  HPI   History Obtained From: Patient and electronic medical record. Mali Bartholomew is a 62 y.o. female presented to [facility] ER on 2020 with complaints of abdominal pain, fever, vaginal bleeding. Transvaginal ultrasound noted large complex lesion in the left ovary could relate to abscess or hemorrhagic neoplasm. Evaluation occurred by Dr. Sussy Scott, and endocervical polyp was removed and sent for pathology. Patient was sent to [facility] ICU stepdown. On 2020, patient was hypotensive and not responsive to 2 L of 0.09 normal saline systolic blood pressure remained 60-74 with a heart rate of 120. Levophed drip was started and patient was admitted to the ICU for further evaluation and care. On 2020, exploratory laparotomy with left salpingo-oophorectomy and small bowel exploration for ovarian abscess per Dr. Sussy Scott. Patient was returned to ICU on mechanical ventilation. Patient was extubated on 2020 and on 2020 patient was stable to be transferred out of ICU. Pulmonary medicine was consulted because of a high pCO2 on ABG. During our encounter, patient complains of shortness of breath and a dry cough. Patient denies chest pain, hemoptysis, leg swelling.   Patient states that she noticed herself snoring at night. She states that she gets up in the middle of the night to use the restroom. Patient also states that she takes naps during the day and feels sleepy during the day. Patient states that she was diagnosed with COPD by her family physician Dr. Val Rosa. She states that she has an albuterol inhaler and is not on home O2. Patient has smoked half a pack of cigarettes for 20 years. She states that she quit last year. Past 24 hours:  Patient has been afebrile. Endorses SOB at rest, dry cough, and orthopnea. Denies hemoptysis and leg swelling. Comfortable on room air, speaking with ease.      PMHx   Past Medical History      Diagnosis Date    Allergic rhinitis     Chronic back pain     Headache(784.0)     Panlobular emphysema ([address] 3/15/2018      Past Surgical History        Procedure Laterality Date    BACK SURGERY      COLONOSCOPY Left 8/2/2019    COLONOSCOPY POLYPECTOMY SNARE/COLD BIOPSY [facility]    LAPAROTOMY N/A 8/8/2020    LAPAROTOMY EXPLORATORY, LEFT SALPINGO OOPHORECTOMY, SMALL BOWEL EXPLORATION performed by Zackary Gonzalez MD [facility]    Current Medications    famotidine  20 mg Oral BID    lactobacillus  1 capsule Oral TID WC    miconazole   Topical BID    furosemide  40 mg Intravenous BID    polyethylene glycol  17 g Oral Daily    metoclopramide  10 mg Intravenous Q12H    dilTIAZem  30 mg Oral 4 times per day    phosphorus replacement protocol   Other RX Placeholder    magnesium replacement protocol   Other RX Placeholder    lidocaine 1 % injection  5 mL Intradermal Once    ipratropium-albuterol  1 ampule Inhalation Q4H WA    insulin lispro  0-6 Units Subcutaneous Q6H    calcium replacement protocol   Other RX Placeholder    sodium chloride flush  10 mL Intravenous 2 times per day    enoxaparin  40 mg Subcutaneous Daily    cefepime  2 g Intravenous Q12H    metroNIDAZOLE  500 mg Intravenous Q8H     albuterol sulfate HFA, morphine **OR** morphine, bisacodyl, [Held by provider] HYDROcodone-acetaminophen, fentanNYL, acetaminophen **OR** acetaminophen, potassium chloride, potassium chloride, potassium chloride **OR** potassium alternative oral replacement **OR** potassium chloride, glucose, dextrose, glucagon (rDNA), dextrose, sodium chloride flush, promethazine **OR** ondansetron  IV Drips/Infusions   PN-Adult  3 IN 1 Central Line (Custom) 75 mL/hr at 08/17/20 1738    dextrose       Home Medications  Medications Prior to Admission: famotidine (PEPCID) 20 MG tablet, Take 20 mg by mouth 2 times daily  albuterol sulfate  (90 Base) MCG/ACT inhaler, INHALE 2 PUFFS BY MOUTH INTO THE LUNGS EVERY 6 HOURS AS NEEDED FOR WHEEZING  nicotine polacrilex (EQL NICOTINE) 4 MG lozenge, Take 1 lozenge by mouth as needed for Smoking cessation  Cyclobenzaprine HCl (FLEXERIL PO), Take  by mouth nightly. oxyCODONE-acetaminophen (PERCOCET) 7.5-325 MG per tablet, Take 1 tablet by mouth 2 times daily as needed. ibuprofen (ADVIL;MOTRIN) 200 MG tablet, Take 400 mg by mouth every 8 hours as needed. Pseudoephedrine-Guaifenesin (MUCINEX D PO), Take by mouth  Diet    DIET GENERAL;  PN-Adult  3 IN 1 Central Line (Custom)  Allergies    Latex; Flu virus vaccine;  Fluzone [influenza vac split quad]; Sulfa antibiotics; and Lead (elemental)  Social History     Social History     Socioeconomic History    Marital status: Single     Spouse name: Not on file    Number of children: 1    Years of education: 15    Highest education level: High school graduate   Occupational History    Not on file   Social Needs    Financial resource strain: Not hard at all   GoPro insecurity     Worry: Never true     Inability: Never true   [employer] needs     Medical: No     Non-medical: No   Tobacco Use    Smoking status: Former Smoker     Packs/day: 0.50     Years: 40.00     Pack years: 20.00     Types: Cigarettes     Start date: 1978     Last attempt to quit: 2018     Years since quittin.3    Smokeless tobacco: Never Used   Substance and Sexual Activity    Alcohol use: No     Alcohol/week: 0.0 standard drinks    Drug use: No    Sexual activity: Never   Lifestyle    Physical activity     Days per week: Not on file     Minutes per session: Not on file    Stress: Not on file   Relationships    Social connections     Talks on phone: Not on file     Gets together: Not on file     Attends Church service: Not on file     Active member of club or organization: Not on file     Attends meetings of clubs or organizations: Not on file     Relationship status: Not on file    Intimate partner violence     Fear of current or ex partner: Not on file     Emotionally abused: Not on file     Physically abused: Not on file     Forced sexual activity: Not on file   Other Topics Concern    Not on file   Social History Narrative    Not on file     Family History          Problem Relation Age of Onset    High Blood Pressure Mother     Asthma Mother     High Blood Pressure Father     Diabetes Maternal Grandmother      Sleep History    Never diagnosed with sleep apnea in the past.  Occupational history   Occupation:  She is current working: Yes  Type of profession: full time job doing . History of tobacco smoking:Yes  Amount of tobacco smokin.5 PPD. Years of tobacco smokin.                                    Quit smoking: Yes.               Quit [facility]:[id number]   Current smoker: No.         History of recreational or IV drug use in the past:NO     History of exposure to coal mines/coal dust: NO  History of exposure to foundry dust/welding: NO  History of exposure to quarry/silica/sandblasting: NO  History of exposure to asbestos/working with breaks/ships: NO  History of exposure to farm dust: NO  History of recent travel to long distances: NO  History of exposure to birds, pigeons, or chickens in the past:NO      History of pulmonary embolism in the past: No            History of DVT in the past:No          Vitals     height is 5' 8\" (1.727 m) and weight is 223 lb 6.4 oz (101.3 kg). Her oral temperature is 99.2 °F (37.3 °C). Her blood pressure is 126/62 and her pulse is 102. Her respiration is 20 and oxygen saturation is 91%. Body mass index is 33.97 kg/m². SUPPLEMENTAL O2: O2 Flow Rate (L/min): 2 L/min     I/O        Intake/Output Summary (Last 24 hours) at 8/18/2020 0820  Last data filed at 8/18/2020 0341  Gross per 24 hour   Intake 2135.69 ml   Output 3100 ml   Net -964.31 ml     I/O last 3 completed shifts: In: 2135.7 [P.O.:240; I.V.:1895.7]  Out: 3100 [Urine:3100]   Patient Vitals for the past 96 hrs (Last 3 readings):   Weight   08/18/20 0341 223 lb 6.4 oz (101.3 kg)       Exam   Nursing note and vitals reviewed. Constitutional: Patient is oriented to person, place, and time. Patient appears well-developed and well-nourished. No distress on room air. Head: Normocephalic and atraumatic. Mouth/Throat: Oropharynx is clear and moist. No oropharyngeal exudate. No oral thrush. Eyes: Conjunctivae are normal. Pupils are equal, round, and reactive to light. Right eye exhibits no discharge. Left eye exhibits no discharge. No scleral icterus. Neck: Neck supple. No JVD present. No tracheal deviation present. No thyromegaly present. Cardiovascular: Normal rate, regular rhythm, normal heart sounds. Exam reveals no gallop and no friction rub. No murmur heard. Pulmonary/Chest: Effort normal and tachypneic. No stridor. No respiratory distress. No wheezes. No rales. Patient exhibits no tenderness. Decreased breath sounds bilateral lung bases. Abdominal: Soft. Bowel sounds are normal. Patient exhibits no distension and no mass. No tenderness. Patient has no rebound and no guarding. Musculoskeletal: Normal range of motion.   Extremities: Patient exhibits no tenderness. 2+ pitting edema left lower extremity. 1+ pitting edema right lower extremity. Lymphadenopathy: No cervical adenopathy. Neurological: Patient is alert and oriented to person, place, and time. Skin: Skin is warm and dry. No rash noted. Patient is not diaphoretic. Psychiatric: Patient  has a normal mood and affect. Patient behavior is normal.          Labs  - Old records and notes have been reviewed in [facility]   ABG  Lab Results   Component Value Date    PH 7.38 08/14/2020    PO2 68 08/14/2020    PCO2 60 08/14/2020    HCO3 35 08/14/2020    O2SAT 92 08/14/2020     Lab Results   Component Value Date    IFIO2 1 08/14/2020    MODE PC 08/10/2020    SETPEEP 6.0 08/10/2020     CBC  Recent Labs     08/17/20  0336 08/18/20  0335   WBC 18.6* 14.1*   RBC 3.21* 3.13*   HGB 9.6* 9.4*   HCT 31.5* 30.4*   MCV 98.1 97.1   MCH 29.9 30.0   MCHC 30.5* 30.9*   * 325   MPV 10.9 10.8      BMP  Recent Labs     08/16/20  0413 08/17/20  0336 08/18/20  0335    132* 137   K 4.6 4.6 4.5    98 101   CO2 29 28 30   BUN 27* 26* 22   CREATININE 0.3* 0.3* 0.4   GLUCOSE 151* 135* 133*   MG 2.0 2.1 2.0   PHOS 2.2* 3.2 2.4   CALCIUM 7.4* 7.5* 7.6*     LFT  No results for input(s): AST, ALT, ALB, BILITOT, ALKPHOS, LIPASE in the last 72 hours. Invalid input(s): AMYLASE  TROP  No results found for: TROPONINT  BNP  No results for input(s): BNP in the last 72 hours. Lactic Acid  No results for input(s): LACTA in the last 72 hours. INR  No results for input(s): INR, PROTIME in the last 72 hours. PTT  No results for input(s): APTT in the last 72 hours. Glucose  Recent Labs     08/17/20  1453 08/17/20 2110 08/18/20  0354   POCGLU 134* 137* 130*     UA No results for input(s): SPECGRAV, PHUR, COLORU, CLARITYU, MUCUS, PROTEINU, BLOODU, RBCUA, WBCUA, BACTERIA, NITRU, GLUCOSEU, BILIRUBINUR, UROBILINOGEN, KETUA, LABCAST, LABCASTTY, AMORPHOS in the last 72 hours. Invalid input(s): CRYSTALS.     PFTs Sleep studies   None in Epic. Cultures    Pneumonia Panel, Molecular - 8/8/2020   Source ET aspirates   Pseudomonas aeruginosa by PCR DetectedAbnormal      Culture, Urine - 8/8/2020   Urine Culture, Routine---No growth-preliminary No growth     Culture, Anaerobic and Aerobic - 8/8/2020   Organism Abnormal----Fusobacterium nucleatum     COVID-19 - 8/7/2020   SARS-CoV-2, NAAT NOT DETECTED     EKG       Echocardiogram   None in Epic. Radiology    CXR  8/15/2020   XR CHEST (2 VW)     Impression:  1. The right PICC is unchanged in position with the distal tip overlying the superior vena cava. 2. There are small bilateral pleural effusions. There is additional opacity at both lung bases which most commonly represents atelectasis and/or infiltrates. There is no pulmonary vascular congestion. CT Scans  (See actual reports for details)    8/18/2020   CT ABDOMEN AND PELVIS WITH CONTRAST ENHANCEMENT   Impression:  1. Bilateral pleural effusions. Bibasilar atelectasis/pneumonia. 2. Postoperative changes in the pelvis. . Small amount of ascites in the abdomen and pelvis. Findings of abdominal and pelvic subcutaneous edema/anasarca. 3. Cholelithiasis. Hepatic steatosis. Findings of moderate postoperative ileus.              Assessment   -Acute on chronic respiratory failure due to postoperative sedation  -Possible obstructive sleep apnea due to increased neck size with a Mallampati class IV   -Chronic history of tobacco smoking  -COPD  -Left lower lobe atelectasis versus pneumonia  -Resolved septic shock  -Postoperative ileus  -S/p exploratory laparotomy with left salpingo-oophorectomy and small bowel exploration  Plan   -Incentive spirometry, CXR showed stable LLL minimal atelectasis   -CPAP with a pressure of 12 at night  -DuoNebs every 4 hours while patient is awake  -MetaNeb every 4 hours  -Home O2 evaluation  -Stiolto 2 inhalations/daily at the time of discharge  -Follow-up with  Tiffanie at pulmonary outpatient clinic in 10-12 weeks  -Will schedule Melanee Dopp for nocturnal polysomnogram (Sleep study) with split night protocol at [facility] sleep lab after discharge from the current hospitalization.   -Patient to follow with Ms. Ayaan Casanova. Yoanna Mares CNP ( Dr. Mariza Dyer) sleep clinic at [address] [facility], [address] for Pulmonary disease in 6 to 8 weeks after the sleep study with CPAP down load  to go over the study reports and down load review. Questions and concerns addressed.     Electronically signed by   Temo Urbano MD on 8/18/2020 at 8:20 AM

## 2020-08-18 NOTE — DISCHARGE INSTR - DIET

## 2020-08-19 LAB
ALBUMIN SERPL-MCNC: 2.5 G/DL (ref 3.5–5.1)
ALP BLD-CCNC: 44 U/L (ref 38–126)
ALT SERPL-CCNC: 17 U/L (ref 11–66)
ANION GAP SERPL CALCULATED.3IONS-SCNC: 6 MEQ/L (ref 8–16)
AST SERPL-CCNC: 24 U/L (ref 5–40)
BASOPHILS # BLD: 0.3 %
BASOPHILS ABSOLUTE: 0 THOU/MM3 (ref 0–0.1)
BILIRUB SERPL-MCNC: 0.2 MG/DL (ref 0.3–1.2)
BUN BLDV-MCNC: 16 MG/DL (ref 7–22)
CALCIUM SERPL-MCNC: 7.8 MG/DL (ref 8.5–10.5)
CHLORIDE BLD-SCNC: 103 MEQ/L (ref 98–111)
CO2: 29 MEQ/L (ref 23–33)
CREAT SERPL-MCNC: 0.3 MG/DL (ref 0.4–1.2)
EOSINOPHIL # BLD: 1.5 %
EOSINOPHILS ABSOLUTE: 0.2 THOU/MM3 (ref 0–0.4)
ERYTHROCYTE [DISTWIDTH] IN BLOOD BY AUTOMATED COUNT: 14.8 % (ref 11.5–14.5)
ERYTHROCYTE [DISTWIDTH] IN BLOOD BY AUTOMATED COUNT: 50.2 FL (ref 35–45)
GFR SERPL CREATININE-BSD FRML MDRD: > 90 ML/MIN/1.73M2
GLUCOSE BLD-MCNC: 184 MG/DL (ref 70–108)
HCT VFR BLD CALC: 28.9 % (ref 37–47)
HEMOGLOBIN: 8.8 GM/DL (ref 12–16)
IMMATURE GRANS (ABS): 0.24 THOU/MM3 (ref 0–0.07)
IMMATURE GRANULOCYTES: 2.1 %
LYMPHOCYTES # BLD: 8.7 %
LYMPHOCYTES ABSOLUTE: 1 THOU/MM3 (ref 1–4.8)
MAGNESIUM: 2.2 MG/DL (ref 1.6–2.4)
MCH RBC QN AUTO: 30.3 PG (ref 26–33)
MCHC RBC AUTO-ENTMCNC: 30.4 GM/DL (ref 32.2–35.5)
MCV RBC AUTO: 99.7 FL (ref 81–99)
MONOCYTES # BLD: 7.8 %
MONOCYTES ABSOLUTE: 0.9 THOU/MM3 (ref 0.4–1.3)
NUCLEATED RED BLOOD CELLS: 0 /100 WBC
PHOSPHORUS: 2.2 MG/DL (ref 2.4–4.7)
PLATELET # BLD: 402 THOU/MM3 (ref 130–400)
PMV BLD AUTO: 10.9 FL (ref 9.4–12.4)
POTASSIUM SERPL-SCNC: 3.9 MEQ/L (ref 3.5–5.2)
RBC # BLD: 2.9 MILL/MM3 (ref 4.2–5.4)
SEG NEUTROPHILS: 79.6 %
SEGMENTED NEUTROPHILS ABSOLUTE COUNT: 9.2 THOU/MM3 (ref 1.8–7.7)
SODIUM BLD-SCNC: 138 MEQ/L (ref 135–145)
TOTAL PROTEIN: 5.3 G/DL (ref 6.1–8)
WBC # BLD: 11.6 THOU/MM3 (ref 4.8–10.8)

## 2020-08-19 NOTE — CONSULTS
exploration. CURRENT MEDICATIONS:  Include Tylenol, albuterol, bisacodyl, Cardizem,  Lovenox, Lasix, Norco, insulin, ipratropium, albuterol, metoclopramide,  miconazole, IV Zosyn, polyethylene glycol. REVIEW OF SYSTEMS:  As noted above. She has no headache. No blurring  of vision. No sore throat. No chest pain. The rest was as noted on  HPI. PHYSICAL EXAMINATION:  VITAL SIGNS:  Temperature 98.6, respirations 24, pulse 107, and blood  pressure 142/65. HEENT:  She has pale conjunctivae. Anicteric sclerae. CHEST:  Bilateral diminished breath sounds. CARDIOVASCULAR SYSTEM:  Regular. ABDOMEN:  Distended. Midline lower abdominal scars. There were  surgical wounds. There were packing noted. No purulent drainage was  noted. EXTREMITIES:  She has trace edema. CNS:  She is conscious. Oriented to person, place, and time. DIAGNOSTICS:  Last sodium was 137, potassium 4.5, chloride 101, bicarb  30, BUN 22, and creatinine 0.4. WBC of 14.1, hemoglobin 9.4, hematocrit  30.4, and platelets of 179. Culture was reviewed. She had  fusobacterium nucleatum and had also pseudomonas. RECOMMENDATION:  We will continue current IV Zosyn. We will monitor her  C-reactive protein. We will continue to [facility] the [facility] [address] [place] [place] [address]. The patient has been started on IV Zosyn starting on 08/18/2020. The  Flagyl and cefepime has been stopped. We will do blood work when she is  transferred to [place] as she is on her way to be transferred to  [address] [place] [place] [address]. Based on her markers of inflammation, we will do follow up  scanning of the abdomen to make sure there is not any persistent  loculated collection.         Drea Khan M.D.    D: 08/18/2020 18:54:34       T: 08/18/2020 20:38:14     ARMANDO/LA NENA_LINDSEY  Job#: [id number]     Doc#: [id number]    CC:

## 2020-08-20 ENCOUNTER — APPOINTMENT (OUTPATIENT)
Dept: GENERAL RADIOLOGY | Age: 60
End: 2020-08-20
Attending: INTERNAL MEDICINE
Payer: COMMERCIAL

## 2020-08-20 LAB
ALBUMIN SERPL-MCNC: 2.6 G/DL (ref 3.5–5.1)
ALP BLD-CCNC: 48 U/L (ref 38–126)
ALT SERPL-CCNC: 18 U/L (ref 11–66)
ANION GAP SERPL CALCULATED.3IONS-SCNC: 9 MEQ/L (ref 8–16)
AST SERPL-CCNC: 20 U/L (ref 5–40)
BILIRUB SERPL-MCNC: 0.2 MG/DL (ref 0.3–1.2)
BILIRUBIN DIRECT: < 0.2 MG/DL (ref 0–0.3)
BUN BLDV-MCNC: 14 MG/DL (ref 7–22)
CALCIUM SERPL-MCNC: 8.4 MG/DL (ref 8.5–10.5)
CHLORIDE BLD-SCNC: 106 MEQ/L (ref 98–111)
CO2: 26 MEQ/L (ref 23–33)
CREAT SERPL-MCNC: 0.4 MG/DL (ref 0.4–1.2)
GFR SERPL CREATININE-BSD FRML MDRD: > 90 ML/MIN/1.73M2
GLUCOSE BLD-MCNC: 191 MG/DL (ref 70–108)
MAGNESIUM: 2 MG/DL (ref 1.6–2.4)
PHOSPHORUS: 2.8 MG/DL (ref 2.4–4.7)
POTASSIUM SERPL-SCNC: 4.2 MEQ/L (ref 3.5–5.2)
SODIUM BLD-SCNC: 141 MEQ/L (ref 135–145)
TOTAL PROTEIN: 5.5 G/DL (ref 6.1–8)

## 2020-08-20 PROCEDURE — 71046 X-RAY EXAM CHEST 2 VIEWS: CPT

## 2020-08-20 PROCEDURE — 74018 RADEX ABDOMEN 1 VIEW: CPT

## 2020-08-21 LAB
ALBUMIN SERPL-MCNC: 2.6 G/DL (ref 3.5–5.1)
ALP BLD-CCNC: 62 U/L (ref 38–126)
ALT SERPL-CCNC: 23 U/L (ref 11–66)
ANION GAP SERPL CALCULATED.3IONS-SCNC: 7 MEQ/L (ref 8–16)
AST SERPL-CCNC: 24 U/L (ref 5–40)
BASOPHILS # BLD: 0.5 %
BASOPHILS ABSOLUTE: 0 THOU/MM3 (ref 0–0.1)
BILIRUB SERPL-MCNC: 0.3 MG/DL (ref 0.3–1.2)
BUN BLDV-MCNC: 12 MG/DL (ref 7–22)
CALCIUM SERPL-MCNC: 8.7 MG/DL (ref 8.5–10.5)
CHLORIDE BLD-SCNC: 105 MEQ/L (ref 98–111)
CO2: 27 MEQ/L (ref 23–33)
CREAT SERPL-MCNC: 0.4 MG/DL (ref 0.4–1.2)
EOSINOPHIL # BLD: 1.6 %
EOSINOPHILS ABSOLUTE: 0.1 THOU/MM3 (ref 0–0.4)
ERYTHROCYTE [DISTWIDTH] IN BLOOD BY AUTOMATED COUNT: 15.3 % (ref 11.5–14.5)
ERYTHROCYTE [DISTWIDTH] IN BLOOD BY AUTOMATED COUNT: 53.2 FL (ref 35–45)
GFR SERPL CREATININE-BSD FRML MDRD: > 90 ML/MIN/1.73M2
GLUCOSE BLD-MCNC: 201 MG/DL (ref 70–108)
HCT VFR BLD CALC: 31.7 % (ref 37–47)
HEMOGLOBIN: 9.4 GM/DL (ref 12–16)
IMMATURE GRANS (ABS): 0.09 THOU/MM3 (ref 0–0.07)
IMMATURE GRANULOCYTES: 1 %
LYMPHOCYTES # BLD: 11.1 %
LYMPHOCYTES ABSOLUTE: 1 THOU/MM3 (ref 1–4.8)
MAGNESIUM: 2 MG/DL (ref 1.6–2.4)
MCH RBC QN AUTO: 30 PG (ref 26–33)
MCHC RBC AUTO-ENTMCNC: 29.7 GM/DL (ref 32.2–35.5)
MCV RBC AUTO: 101.3 FL (ref 81–99)
MONOCYTES # BLD: 10.3 %
MONOCYTES ABSOLUTE: 1 THOU/MM3 (ref 0.4–1.3)
NUCLEATED RED BLOOD CELLS: 0 /100 WBC
PHOSPHORUS: 3 MG/DL (ref 2.4–4.7)
PLATELET # BLD: 614 THOU/MM3 (ref 130–400)
PMV BLD AUTO: 10.9 FL (ref 9.4–12.4)
POTASSIUM SERPL-SCNC: 4.2 MEQ/L (ref 3.5–5.2)
RBC # BLD: 3.13 MILL/MM3 (ref 4.2–5.4)
SEG NEUTROPHILS: 75.5 %
SEGMENTED NEUTROPHILS ABSOLUTE COUNT: 7 THOU/MM3 (ref 1.8–7.7)
SODIUM BLD-SCNC: 139 MEQ/L (ref 135–145)
TOTAL PROTEIN: 6.2 G/DL (ref 6.1–8)
WBC # BLD: 9.3 THOU/MM3 (ref 4.8–10.8)

## 2020-08-24 LAB
ALBUMIN SERPL-MCNC: 2.8 G/DL (ref 3.5–5.1)
ALP BLD-CCNC: 85 U/L (ref 38–126)
ALT SERPL-CCNC: 27 U/L (ref 11–66)
ANION GAP SERPL CALCULATED.3IONS-SCNC: 8 MEQ/L (ref 8–16)
AST SERPL-CCNC: 19 U/L (ref 5–40)
BASOPHILS # BLD: 0.7 %
BASOPHILS ABSOLUTE: 0 THOU/MM3 (ref 0–0.1)
BILIRUB SERPL-MCNC: 0.2 MG/DL (ref 0.3–1.2)
BILIRUBIN DIRECT: < 0.2 MG/DL (ref 0–0.3)
BUN BLDV-MCNC: 14 MG/DL (ref 7–22)
CALCIUM SERPL-MCNC: 9 MG/DL (ref 8.5–10.5)
CHLORIDE BLD-SCNC: 101 MEQ/L (ref 98–111)
CO2: 28 MEQ/L (ref 23–33)
CREAT SERPL-MCNC: 0.6 MG/DL (ref 0.4–1.2)
EOSINOPHIL # BLD: 2.4 %
EOSINOPHILS ABSOLUTE: 0.1 THOU/MM3 (ref 0–0.4)
ERYTHROCYTE [DISTWIDTH] IN BLOOD BY AUTOMATED COUNT: 14.7 % (ref 11.5–14.5)
ERYTHROCYTE [DISTWIDTH] IN BLOOD BY AUTOMATED COUNT: 51.1 FL (ref 35–45)
GFR SERPL CREATININE-BSD FRML MDRD: > 90 ML/MIN/1.73M2
GLUCOSE BLD-MCNC: 246 MG/DL (ref 70–108)
HCT VFR BLD CALC: 30.2 % (ref 37–47)
HEMOGLOBIN: 9 GM/DL (ref 12–16)
IMMATURE GRANS (ABS): 0.03 THOU/MM3 (ref 0–0.07)
IMMATURE GRANULOCYTES: 0.5 %
LYMPHOCYTES # BLD: 19.4 %
LYMPHOCYTES ABSOLUTE: 1.1 THOU/MM3 (ref 1–4.8)
MAGNESIUM: 2 MG/DL (ref 1.6–2.4)
MCH RBC QN AUTO: 29.3 PG (ref 26–33)
MCHC RBC AUTO-ENTMCNC: 29.8 GM/DL (ref 32.2–35.5)
MCV RBC AUTO: 98.4 FL (ref 81–99)
MONOCYTES # BLD: 13.4 %
MONOCYTES ABSOLUTE: 0.7 THOU/MM3 (ref 0.4–1.3)
NUCLEATED RED BLOOD CELLS: 0 /100 WBC
PHOSPHORUS: 3.1 MG/DL (ref 2.4–4.7)
PLATELET # BLD: 493 THOU/MM3 (ref 130–400)
PMV BLD AUTO: 10.7 FL (ref 9.4–12.4)
POTASSIUM SERPL-SCNC: 4.2 MEQ/L (ref 3.5–5.2)
PREALBUMIN: 16.7 MG/DL (ref 20–40)
RBC # BLD: 3.07 MILL/MM3 (ref 4.2–5.4)
SEG NEUTROPHILS: 63.6 %
SEGMENTED NEUTROPHILS ABSOLUTE COUNT: 3.5 THOU/MM3 (ref 1.8–7.7)
SODIUM BLD-SCNC: 137 MEQ/L (ref 135–145)
TOTAL PROTEIN: 6.3 G/DL (ref 6.1–8)
TRIGL SERPL-MCNC: 177 MG/DL (ref 0–199)
WBC # BLD: 5.5 THOU/MM3 (ref 4.8–10.8)

## 2020-08-27 ENCOUNTER — APPOINTMENT (OUTPATIENT)
Dept: CT IMAGING | Age: 60
End: 2020-08-27
Attending: INTERNAL MEDICINE
Payer: COMMERCIAL

## 2020-08-27 ENCOUNTER — HOSPITAL ENCOUNTER (OUTPATIENT)
Age: 60
Setting detail: OUTPATIENT SURGERY
Discharge: ANOTHER HOSPITAL | End: 2020-08-27
Attending: INTERNAL MEDICINE | Admitting: INTERNAL MEDICINE
Payer: COMMERCIAL

## 2020-08-27 LAB
ALBUMIN SERPL-MCNC: 2.9 G/DL (ref 3.5–5.1)
ALP BLD-CCNC: 87 U/L (ref 38–126)
ALT SERPL-CCNC: 25 U/L (ref 11–66)
ANION GAP SERPL CALCULATED.3IONS-SCNC: 8 MEQ/L (ref 8–16)
AST SERPL-CCNC: 16 U/L (ref 5–40)
BASOPHILS # BLD: 0.7 %
BASOPHILS ABSOLUTE: 0 THOU/MM3 (ref 0–0.1)
BILIRUB SERPL-MCNC: 0.2 MG/DL (ref 0.3–1.2)
BILIRUBIN DIRECT: < 0.2 MG/DL (ref 0–0.3)
BUN BLDV-MCNC: 20 MG/DL (ref 7–22)
CALCIUM SERPL-MCNC: 9.2 MG/DL (ref 8.5–10.5)
CHLORIDE BLD-SCNC: 97 MEQ/L (ref 98–111)
CO2: 31 MEQ/L (ref 23–33)
CREAT SERPL-MCNC: 0.4 MG/DL (ref 0.4–1.2)
EOSINOPHIL # BLD: 3 %
EOSINOPHILS ABSOLUTE: 0.1 THOU/MM3 (ref 0–0.4)
ERYTHROCYTE [DISTWIDTH] IN BLOOD BY AUTOMATED COUNT: 14.2 % (ref 11.5–14.5)
ERYTHROCYTE [DISTWIDTH] IN BLOOD BY AUTOMATED COUNT: 50.2 FL (ref 35–45)
GFR SERPL CREATININE-BSD FRML MDRD: > 90 ML/MIN/1.73M2
GLUCOSE BLD-MCNC: 267 MG/DL (ref 70–108)
HCT VFR BLD CALC: 31.7 % (ref 37–47)
HEMOGLOBIN: 9.6 GM/DL (ref 12–16)
IMMATURE GRANS (ABS): 0.01 THOU/MM3 (ref 0–0.07)
IMMATURE GRANULOCYTES: 0.2 %
LYMPHOCYTES # BLD: 27.3 %
LYMPHOCYTES ABSOLUTE: 1.2 THOU/MM3 (ref 1–4.8)
MAGNESIUM: 2 MG/DL (ref 1.6–2.4)
MCH RBC QN AUTO: 29.4 PG (ref 26–33)
MCHC RBC AUTO-ENTMCNC: 30.3 GM/DL (ref 32.2–35.5)
MCV RBC AUTO: 97.2 FL (ref 81–99)
MONOCYTES # BLD: 14.8 %
MONOCYTES ABSOLUTE: 0.6 THOU/MM3 (ref 0.4–1.3)
NUCLEATED RED BLOOD CELLS: 0 /100 WBC
PHOSPHORUS: 3.6 MG/DL (ref 2.4–4.7)
PLATELET # BLD: 359 THOU/MM3 (ref 130–400)
PMV BLD AUTO: 10.4 FL (ref 9.4–12.4)
POTASSIUM SERPL-SCNC: 3.8 MEQ/L (ref 3.5–5.2)
RBC # BLD: 3.26 MILL/MM3 (ref 4.2–5.4)
SEG NEUTROPHILS: 54 %
SEGMENTED NEUTROPHILS ABSOLUTE COUNT: 2.3 THOU/MM3 (ref 1.8–7.7)
SODIUM BLD-SCNC: 136 MEQ/L (ref 135–145)
TOTAL PROTEIN: 6.8 G/DL (ref 6.1–8)
WBC # BLD: 4.3 THOU/MM3 (ref 4.8–10.8)

## 2020-08-27 PROCEDURE — 71270 CT THORAX DX C-/C+: CPT

## 2020-08-27 PROCEDURE — 6360000004 HC RX CONTRAST MEDICATION: Performed by: INTERNAL MEDICINE

## 2020-08-27 PROCEDURE — 3609012700 HC EGD DILATION SAVORY: Performed by: INTERNAL MEDICINE

## 2020-08-27 PROCEDURE — C1726 CATH, BAL DIL, NON-VASCULAR: HCPCS | Performed by: INTERNAL MEDICINE

## 2020-08-27 PROCEDURE — 71275 CT ANGIOGRAPHY CHEST: CPT

## 2020-08-27 PROCEDURE — 2709999900 HC NON-CHARGEABLE SUPPLY: Performed by: INTERNAL MEDICINE

## 2020-08-27 RX ADMIN — IOPAMIDOL 80 ML: 755 INJECTION, SOLUTION INTRAVENOUS at 11:22

## 2020-08-27 NOTE — PROGRESS NOTES
EGD complete, photos taken, pt dilated with balloon dilator size 15,16.5 and 18. Pt tolerated procedure well.

## 2020-09-17 ENCOUNTER — VIRTUAL VISIT (OUTPATIENT)
Dept: FAMILY MEDICINE CLINIC | Age: 60
End: 2020-09-17
Payer: COMMERCIAL

## 2020-09-17 PROCEDURE — 99214 OFFICE O/P EST MOD 30 MIN: CPT | Performed by: NURSE PRACTITIONER

## 2020-09-17 RX ORDER — BUTALBITAL, ACETAMINOPHEN AND CAFFEINE 50; 325; 40 MG/1; MG/1; MG/1
1 TABLET ORAL EVERY 4 HOURS PRN
Qty: 30 TABLET | Refills: 0 | COMMUNITY
Start: 2020-09-17

## 2020-09-17 RX ORDER — PANTOPRAZOLE SODIUM 40 MG/1
40 TABLET, DELAYED RELEASE ORAL
Qty: 30 TABLET | Refills: 0 | COMMUNITY
Start: 2020-09-17

## 2020-09-17 ASSESSMENT — ENCOUNTER SYMPTOMS
ABDOMINAL PAIN: 0
BACK PAIN: 1
SHORTNESS OF BREATH: 0
COUGH: 0
NAUSEA: 0

## 2020-09-17 NOTE — PROGRESS NOTES
Post-Discharge Transitional Care Management Services      Alaina Sky   YOB: 1960    Date of Visit:  9/17/2020  30 DayPost-Discharge Date: 10/4/20    Admit date  8/7/2020               Discharge date:  8/18/2020 5:35 PM     Chief Complaint on presentation :-  Abdominal pain     Discharge Assessment and Plan:-      1. Acute hypoxic respiratory failure likely due to postop sedation, likely also has underlying CHICO.   - Patient extubated on 8/11/2020 after requiring intubation for surgical procedure. - Patient continues to require oxygen to maintain adequate O2 saturations, currently on 1L via nasal cannula. - CPAP overnight  - breathing treatment  - Still + 12L fluid balance. Continue IV diuresis. Cut it down to lasix daily     2. Large left adnexal abscess: S/p exploratory laparotomy with left salpingo oophorectomy, small bowel exploration, and irrigation of purulent material on 8/8/2024 ovarian abscess per Dr. Amber Love.    - On IV cefepime and Flagyl (Day #11). -We will change her antibiotics to IV Zosyn as patient is not tolerating Flagyl.  She keeps on having severe nausea every time she eats or tries to drink. Monster Pulse denies any dysphagia but rather states that food and drink gives metallic taste which makes her nauseated and had episodes of vomiting as well.  She is already on bowel regimen.  Had a repeat CT abdomen with IV contrast that showed surgical changes and mod ileus.  We will continue Reglan.  Of note she did have very small bowel movement yesterday.  Changed to IV Protonix.  I dont suspect there is a mechanical etiology for her current symptoms.  I suspect this is still related to Flagyl.  We will continue to monitor with changing IV antibiotics.     3. Acute normocytic anemia: Improving.  Current H&H 10/33.3 with an MCV of 99.7.      4. Postoperative ileus: Passing gas. Unknown last BM, but abdo is soft, NT. Advance diet per surgery. Continue reglan.    5. Leukocytosis: improving.  Continue IV antibiotics.     6. GERD: Continue Pepcid.     7. Physical deconditioning: Secondary to above issues.  PT/OT following, recommendations for continued therapy after discharge noted, appreciate assistance.     8. Obesity: BMI 33.74     9. Malnutrition              - Currently on TPN     Initial H and P and Hospital course:-     62year old  female transferred to [facility] ICU 8/8/2020 with hypotension and abdominal pain. Patient has a significant history of former smoker-quit 2019, COPD, impaired glucose tolerance, GERD, chronic back pain, headaches, and obesity. Estrella presented to [facility] ER with complaint of abdominal pain, fever, vaginal bleeding.  Patient identifies onset vaginal bleeding with passage of clots, averaging 3-4 pads/day. Onset proximately 2 weeks ago. Hernandez Faye also identifies a fever of 102.9 that started approximately 2 days ago with complaints of lower abdominal pain. Yuki Golden states she noted a mass coming out of her vagina and tried to pull it out unsuccessfully secondary to pain.  8/7/2020 transvaginal ultrasound noted large complex lesion in the left ovary could relate to abscess or hemorrhagic neoplasm.  CT abdomen pelvis with IV contrast mild groundglass airspace opacities within the lingula suggest pneumonia.  Mild fatty infiltration of liver, multiple gallstones biliary.  A spiculated cystic mass within the left pelvis.  Large pelvic mass which is mildly inflamed likely related to the left ovary. Evaluation occurred by OB/GYN Dr. Boles, an Endocervical polyp was removed and sent for pathology.  Patient was sent to [facility] ICU stepdown.   8/8/2020 I was called to evaluate patient secondary to hypotension.  Patient was not responsive to 2 L of 0.9 normal saline systolic blood pressure remained 68-74 with a heart rate of 120.  Levophed drip was started and patient was admitted to the ICU for further evaluation and care.     Patient was weaned off Levo. OB/Gyn service was consulted and she was taken to OR and had ex lap with left salpingo oophorectomy, small bowel exploration and irrigation of purulent material. She was started on IV antibiotics. She was extubated and transferred to step down floor. She continued to work with PT/OT. Had few attempts to increase her diet but patient was intolerant. She has severe nausea and c/o metalic taste in mouth. Also requiring IV antiemetics before flagyl. Started on TPN for nutrition. Over next several days, patient continued to feel nauseated and not able to eat. Patient also has ileus with her last bowel movement a day ago. Due to her chronic nature of symptoms and requiring TPN and no BM, patient will be transferred to [facility]. She will continue IV antibiotics for total of 14 days. Of note her antibiotics was changed to IV zosyn today in order to see if that will help her nausea. She will be continued no home medications. Was transferred to [facility] for continued IV ATB therapy. She was admitted on 8/18/20 and discharged 9/4/20    Allergies   Allergen Reactions    Latex Shortness Of Breath     And hives    Flu Virus Vaccine     Fluzone [Influenza Vac Split Quad] Other (See Comments)     Letter in media     Sulfa Antibiotics Other (See Comments)     Eye ulcers from eye drops    Lead (Elemental) Rash     Outpatient Medications Marked as Taking for the 9/17/20 encounter (Virtual Visit) with KYLE Marinelli CNP   Medication Sig Dispense Refill    pantoprazole (PROTONIX) 40 MG tablet Take 1 tablet by mouth daily (with breakfast) 30 tablet 0    butalbital-acetaminophen-caffeine (FIORICET, ESGIC) -40 MG per tablet Take 1 tablet by mouth every 4 hours as needed for Headaches 30 tablet 0         There were no vitals filed for this visit. There is no height or weight on file to calculate BMI.      Wt Readings from Last 3 Encounters:   08/18/20 223 lb 6.4 oz (101.3 kg)   07/09/20 205 lb 14.4 oz hospitalizations.   BP running on lower end normal.   No leg swelling    Follow up with GYN and GASTRO  Will repeat CBC  RTO if symptoms worsen or stay the same      Diagnostic test results reviewed: inpatient labs, post-discharge labs, pathology-Ovarian abscess, chest x-ray, x-ray-abdomen, CT-ABdomen/Pelvis and CT angiogram-Chest    Patient risk of morbidity and mortality: moderate    Medical Decision Making: moderate complexity

## 2020-09-18 ENCOUNTER — TELEPHONE (OUTPATIENT)
Dept: FAMILY MEDICINE CLINIC | Age: 60
End: 2020-09-18

## 2020-09-18 NOTE — TELEPHONE ENCOUNTER
Pt called office left  stating she had a VV with you yesterday and she was to call back with the lab her orders are to be faxed to. She needs them sent to [address] at [phone]. Please advise.

## 2020-10-12 RX ORDER — FAMOTIDINE 20 MG/1
20 TABLET, FILM COATED ORAL 2 TIMES DAILY
Qty: 60 TABLET | Refills: 3 | Status: SHIPPED | OUTPATIENT
Start: 2020-10-12

## 2021-01-27 ENCOUNTER — CARE COORDINATION (OUTPATIENT)
Dept: OTHER | Facility: CLINIC | Age: 61
End: 2021-01-27

## 2021-01-27 NOTE — CARE COORDINATION
Associate Care Manager [facility]) called patient for initial CC outreach after patient's name provided to ACM from  from high cost claimant list. ACM left voicemail message with request for return call to discuss ACM program.    ACM will continue to outreach patient. Lashonda Tony RN BSN  Associate Care Manager  Phone: [phone]  Email: [email or web address]

## 2021-02-08 NOTE — CARE COORDINATION
8/14/20, 1:39 PM EDT    DISCHARGE BARRIERS        Patient transferred to 3A. Report given to unit Wenyd Crain, regarding discharge plan for this patient. 1.2

## 2021-02-18 ENCOUNTER — CARE COORDINATION (OUTPATIENT)
Dept: OTHER | Facility: CLINIC | Age: 61
End: 2021-02-18

## 2021-02-18 NOTE — CARE COORDINATION
ACBOLIVAR attempted 2nd outreach to reach patient for introduction to Associate Care Management after referral received from [facility]  for high cost claimant. HIPAA compliant message left requesting a return phone call at patients convenience. Unable to Reach Letter mailed to patient. Will continue to outreach patient. Dear Fransisco Beck,    My name is Rosa Lopez RN, Associate Care Manager for [facility] and I have been trying to reach you. The Associate Care Management (ACM) program is a free-of-charge confidential service provided to our employees and their family members covered by the [address] [place] [address],[address]. The program will provide an associate and his/her family with the [address]'s expertise to assist in navigating the health care delivery system, provider services, and their overall care needs--so as to assure and improve health care interactions and enhance the quality of life. This program is designed to provide you with the opportunity to have a [facility] partner with you for the following services:     1) when you come home from the hospital or emergency room   2) when help is needed to manage your disease   3) when you need assistance coordinating services or appointments  4) when you need additional education, resources or assistance reaching your                     Be Well Health Program goals/requirements such as Be Well With Diabetes      [facility] is dedicated to empowering the good health of its community and improving the quality of care and care experiences for employees and their families. We are committed to safeguarding patient confidentiality and privacy, assuring that every employee has the respect he or she deserves in managing their health.  The information shared with your care manager will not be shared with anyone else aside from those you identify as part of your care team, and will only be used to assist you with any identified care needs. Please contact me if you would like this service provided to you. Sincerely,      Jessica Rainey RN BSN  Associate Care Manager  Phone: [phone]  Email: [email or web address]    ACM mailed the following handouts with this letter: Judith Wei, Nurse Access Line, and Right Care Right Place Right Time.

## 2021-02-18 NOTE — LETTER
Dear Louann Brown,    My name is Basia Beavers RN, Associate Care Manager for [facility] and I have been trying to reach you. The Associate Care Management (ACM) program is a free-of-charge confidential service provided to our employees and their family members covered by the [facility]. The program will provide an associate and his/her family with the [address]'s expertise to assist in navigating the health care delivery system, provider services, and their overall care needsso as to assure and improve health care interactions and enhance the quality of life. This program is designed to provide you with the opportunity to have a [facility] partner with you for the following services:     1) when you come home from the hospital or emergency room   2) when help is needed to manage your disease   3) when you need assistance coordinating services or appointments  4) when you need additional education, resources or assistance reaching your                     Be Well Health Program goals/requirements such as Be Well With Diabetes      [facility] [facility] is dedicated to empowering the good health of its community and improving the quality of care and care experiences for employees and their families. We are committed to safeguarding patient confidentiality and privacy, assuring that every employee has the respect he or she deserves in managing their health. The information shared with your care manager will not be shared with anyone else aside from those you identify as part of your care team, and will only be used to assist you with any identified care needs. Please contact me if you would like this service provided to you. Sincerely,      Basia Beavers RN BSN  Associate Care Manager  Phone: [phone]  Email: [email or web address]

## 2021-03-04 ENCOUNTER — CARE COORDINATION (OUTPATIENT)
Dept: OTHER | Facility: CLINIC | Age: 61
End: 2021-03-04

## 2025-01-03 NOTE — TELEPHONE ENCOUNTER
Patient was seen yesterday by [facility]. She is calling in to get an off work note faxed to her employer, [employer]. She was given an off work note, and to return to work on 3/1/18. She said she is still feeling bad and nausea and she wanted to know if she could get the note extended till Friday 3/2/18 or even Monday 3/5/18? Please call her to advise, and she will get a fax #.
[phone]

## 2025-04-15 ENCOUNTER — TELEPHONE (OUTPATIENT)
Dept: SURGERY | Age: 65
End: 2025-04-15

## 2025-04-15 NOTE — TELEPHONE ENCOUNTER
Called and left pt. VM to see if shewants  to schedule their 5 yr colonoscopy screening. Previously seen Dr. Doherty. Need to see if she wants to schedule with Dr. Carreon or Dr. Luna.

## 2025-05-15 NOTE — PLAN OF CARE
Please let pt know her hemoglobin has improved some after blood transfusion, but iron still low. I know she is taking iron by mouth but I would recommend iron infusion. If agreeable, I placed order. Please give her number to schedule   Wendy Felipe RN  Outcome: Ongoing  Note: Minimal secretions fron ett. Problem: Cardiac Output - Decreased:  Goal: Hemodynamic stability will improve  Description: Hemodynamic stability will improve  8/10/2020 1132 by Nicole Villalobos RN  Outcome: Ongoing  Note: Supported with levophed     Problem: Gas Exchange - Impaired:  Goal: Levels of oxygenation will improve  Description: Levels of oxygenation will improve  8/10/2020 1132 by Nicole Villalobos RN  Outcome: Ongoing     Problem: Pain:  Goal: Pain level will decrease  Description: Pain level will decrease  8/10/2020 1132 by Nicole Villalobos RN  Outcome: Ongoing  Note: Cpot assessment for pain     Problem: Pain:  Goal: Recognizes and communicates pain  Description: Recognizes and communicates pain  Outcome: Ongoing     Problem: Pain:  Goal: Control of acute pain  Description: Control of acute pain  Outcome: Ongoing     Problem: Pain:  Goal: Control of chronic pain  Description: Control of chronic pain  Outcome: Ongoing     Problem: Serum Glucose Level - Abnormal:  Goal: Ability to maintain appropriate glucose levels will improve to within specified parameters  Description: Ability to maintain appropriate glucose levels will improve to within specified parameters  Outcome: Ongoing  Note: Sliding scale     Problem: Skin Integrity - Impaired:  Goal: Will show no infection signs and symptoms  Description: Will show no infection signs and symptoms  8/10/2020 1132 by Nicole Villalobos RN  Outcome: Ongoing  Note: Tylenol q 4 hours, no chills noted.      Problem: Skin Integrity - Impaired:  Goal: Absence of new skin breakdown  Description: Absence of new skin breakdown  Outcome: Ongoing     Problem: Sleep Pattern Disturbance:  Goal: Appears well-rested  Description: Appears well-rested  Outcome: Ongoing     Problem: Tissue Perfusion, Altered:  Goal: Circulatory function within specified parameters  Description: Circulatory function within specified parameters  Outcome: Ongoing  Note: No longer mottled, still om levo. Problem: Tissue Perfusion - Cardiopulmonary, Altered:  Goal: Hemodynamic stability will improve  Description: Hemodynamic stability will improve  Outcome: Ongoing  Note: Still on levo     Problem: Infection - Central Venous Catheter-Associated Bloodstream Infection:  Goal: Will show no infection signs and symptoms  Description: Will show no infection signs and symptoms  8/10/2020 1132 by Kenton Swan RN  Outcome: Ongoing  Note: No redness at site     Problem: Infection - Surgical Site:  Goal: Will show no infection signs and symptoms  Description: Will show no infection signs and symptoms  8/10/2020 1132 by Kenton Swan RN  Outcome: Ongoing  Note: No redness at site     Problem: Urinary Elimination:  Goal: Signs and symptoms of infection will decrease  Description: Signs and symptoms of infection will decrease  8/10/2020 1132 by Kenton Swan RN  Outcome: Ongoing     Problem: Infection - Ventilator-Associated Pneumonia:  Goal: Absence of pulmonary infection  Description: Absence of pulmonary infection  8/10/2020 1132 by Kenton Swan RN  Outcome: Ongoing  Note: Minimal secretions     Problem: Skin Integrity:  Goal: Will show no infection signs and symptoms  Description: Will show no infection signs and symptoms  8/10/2020 1132 by Kenton Swan RN  Outcome: Ongoing  Note: No redness at site     Problem: Nutrition  Goal: Optimal nutrition therapy  8/10/2020 1132 by Kenton Swan RN  Outcome: Not Met This Shift  Note: Pt npo     Problem: Bowel Function - Altered:  Goal: Bowel elimination is within specified parameters  Description: Bowel elimination is within specified parameters  8/10/2020 1132 by Kenton Swan RN  Outcome: Not Met This Shift  Note: No bowel sounds, no bm.      Problem: Fluid Volume - Imbalance:  Goal: Absence of imbalanced fluid volume signs and symptoms  Description: Absence of imbalanced fluid volume signs and symptoms  8/10/2020 1132 by Adilia Christiansen RN  Outcome: Not Met This Shift  Note: Positive intake     Problem: Nutrition Deficit:  Goal: Ability to achieve adequate nutritional intake will improve  Description: Ability to achieve adequate nutritional intake will improve  Outcome: Not Met This Shift  Note: npo

## (undated) DEVICE — BLANKET THER AD W24XL60IN FAB COVERING SUP SFT ULT THN LTWT

## (undated) DEVICE — SPONGE GZ W4XL4IN COT 12 PLY TYP VII WVN C FLD DSGN

## (undated) DEVICE — YANKAUER,POOLE TIP,STERILE,50/CS: Brand: MEDLINE

## (undated) DEVICE — CONMED SCOPE SAVER BITE BLOCK, 20X27 MM: Brand: SCOPE SAVER

## (undated) DEVICE — TUBING PRSS 36 M F

## (undated) DEVICE — 450 ML BOTTLE OF 0.05% CHLORHEXIDINE GLUCONATE IN 99.95% STERILE WATER FOR IRRIGATION, USP AND APPLICATOR.: Brand: IRRISEPT ANTIMICROBIAL WOUND LAVAGE

## (undated) DEVICE — SOLUTION IV 1000ML 0.45% SOD CHL PH 5 INJ USP VIAFLX PLAS

## (undated) DEVICE — GARMENT,MEDLINE,DVT,INT,CALF,MED, GEN2: Brand: MEDLINE

## (undated) DEVICE — GLOVE ORANGE PI 7 1/2   MSG9075

## (undated) DEVICE — CATHETER ETER IV 22GA L1IN POLYUR STR RADPQ INTROCAN SFTY

## (undated) DEVICE — SOLUTION IV IRRIG WATER 500ML POUR BRL ST 2F7113

## (undated) DEVICE — TUBING IV STOPCOCK 48 CM 3 W

## (undated) DEVICE — APPLICATOR MEDICATED 26 CC SOLUTION CLR STRL CHLORAPREP

## (undated) DEVICE — INTENDED FOR TISSUE SEPARATION, AND OTHER PROCEDURES THAT REQUIRE A SHARP SURGICAL BLADE TO PUNCTURE OR CUT.: Brand: BARD-PARKER ® CARBON RIB-BACK BLADES

## (undated) DEVICE — Device

## (undated) DEVICE — CONTAINER,SPECIMEN,PNEU TUBE,4OZ,OR STRL: Brand: MEDLINE

## (undated) DEVICE — GLOVE SURG SZ 65 THK91MIL LTX FREE SYN POLYISOPRENE

## (undated) DEVICE — ESOPHAGEAL BALLOON DILATATION CATHETER: Brand: CRE FIXED WIRE

## (undated) DEVICE — BLANKET WRM W29.9XL79.1IN UP BODY FORC AIR MISTRAL-AIR

## (undated) DEVICE — SOLUTION IV 500ML 0.9% SOD CHL PH 5 INJ USP VIAFLX PLAS

## (undated) DEVICE — SYRINGE INFL 60ML DISP ALLIANCE II

## (undated) DEVICE — SET LNR RED GRN W/ BASE CLEANASCOPE

## (undated) DEVICE — IV START KIT: Brand: MEDLINE INDUSTRIES, INC.

## (undated) DEVICE — PAD,ABDOMINAL,5"X9",ST,LF,25/BX: Brand: MEDLINE INDUSTRIES, INC.

## (undated) DEVICE — GOWN,SIRUS,NON REINFRCD,LARGE,SET IN SL: Brand: MEDLINE

## (undated) DEVICE — GLOVE ORANGE PI 8   MSG9080

## (undated) DEVICE — PRESSURE MONITORING SET: Brand: TRUWAVE

## (undated) DEVICE — CONTAINER VACUTAINER ANAER CULTURE SWAB

## (undated) DEVICE — SPONGE LAP W18XL18IN WHT COT 4 PLY FLD STRUNG RADPQ DISP ST

## (undated) DEVICE — PAD,NON-ADHERENT,3X8,STERILE,LF,1/PK: Brand: MEDLINE

## (undated) DEVICE — TAPE ADH W2INXL10YD PLAS TRNSPAR H2O RESIST HYPOALRG CURAD

## (undated) DEVICE — TUBING, SUCTION, 1/4" X 20', STRAIGHT: Brand: MEDLINE INDUSTRIES, INC.

## (undated) DEVICE — APPLICATOR MEDICATED 26 CC SOLUTION HI LT ORNG CHLORAPREP

## (undated) DEVICE — KIT INF CTRL 2OZ LUB TBNG L12FT DBL END BRSH SYR OP4

## (undated) DEVICE — BLADE ES L6IN ELASTOMERIC COAT EXT DURABLE BEND UPTO 90DEG

## (undated) DEVICE — YANKAUER,BULB TIP,W/O VENT,RIGID,STERILE: Brand: MEDLINE

## (undated) DEVICE — SET CATH 20GA L1.5IN RAD ART POLYUR RADPQ W/ INTEGR 0.018IN

## (undated) DEVICE — 500ML,PRESSURE INFUSER W/STOPCOCK: Brand: MEDLINE

## (undated) DEVICE — LINER SUCT CANSTR 1500CC SEMI RIG W/ POR HYDROPHOBIC SHUT

## (undated) DEVICE — APPLIER LIG CLP M L11IN TI STR RNG HNDL FOR 20 CLP DISP

## (undated) DEVICE — SET ADMIN 25ML L117IN PMP MOD CK VLV RLER CLMP 2 SMRTSITE

## (undated) DEVICE — GLOVE ORANGE PI 7   MSG9070

## (undated) DEVICE — TTL1LYR 16FR10ML 100%SIL TMPST TR: Brand: MEDLINE